# Patient Record
Sex: FEMALE | Race: WHITE | NOT HISPANIC OR LATINO | Employment: OTHER | URBAN - METROPOLITAN AREA
[De-identification: names, ages, dates, MRNs, and addresses within clinical notes are randomized per-mention and may not be internally consistent; named-entity substitution may affect disease eponyms.]

---

## 2023-01-20 ENCOUNTER — ANESTHESIA EVENT (OUTPATIENT)
Dept: SURGERY | Facility: HOSPITAL | Age: 88
DRG: 330 | End: 2023-01-20
Payer: MEDICARE

## 2023-01-20 ENCOUNTER — HOSPITAL ENCOUNTER (INPATIENT)
Facility: HOSPITAL | Age: 88
LOS: 10 days | Discharge: HOME HEALTH | DRG: 330 | End: 2023-01-30
Attending: EMERGENCY MEDICINE | Admitting: EMERGENCY MEDICINE
Payer: MEDICARE

## 2023-01-20 ENCOUNTER — ANESTHESIA (OUTPATIENT)
Dept: SURGERY | Facility: HOSPITAL | Age: 88
DRG: 330 | End: 2023-01-20
Payer: MEDICARE

## 2023-01-20 DIAGNOSIS — K56.609 COLON OBSTRUCTION: Primary | ICD-10-CM

## 2023-01-20 DIAGNOSIS — K56.609 SMALL BOWEL OBSTRUCTION: ICD-10-CM

## 2023-01-20 DIAGNOSIS — R11.2 NAUSEA AND VOMITING, UNSPECIFIED VOMITING TYPE: ICD-10-CM

## 2023-01-20 DIAGNOSIS — E86.0 DEHYDRATION: ICD-10-CM

## 2023-01-20 DIAGNOSIS — K56.609 LARGE BOWEL OBSTRUCTION: ICD-10-CM

## 2023-01-20 DIAGNOSIS — I25.10 CAD (CORONARY ARTERY DISEASE): ICD-10-CM

## 2023-01-20 LAB
ABORH RETYPE: NORMAL
ALBUMIN SERPL-MCNC: 2.9 G/DL (ref 3.4–4.8)
ALBUMIN/GLOB SERPL: 0.7 RATIO (ref 1.1–2)
ALP SERPL-CCNC: 98 UNIT/L (ref 40–150)
ALT SERPL-CCNC: 16 UNIT/L (ref 0–55)
AST SERPL-CCNC: 25 UNIT/L (ref 5–34)
BASOPHILS # BLD AUTO: 0.03 X10(3)/MCL (ref 0–0.2)
BASOPHILS NFR BLD AUTO: 0.4 %
BILIRUBIN DIRECT+TOT PNL SERPL-MCNC: 0.6 MG/DL
BUN SERPL-MCNC: 39 MG/DL (ref 9.8–20.1)
CALCIUM SERPL-MCNC: 8.6 MG/DL (ref 8.4–10.2)
CHLORIDE SERPL-SCNC: 93 MMOL/L (ref 98–111)
CO2 SERPL-SCNC: 25 MMOL/L (ref 23–31)
CREAT SERPL-MCNC: 1.53 MG/DL (ref 0.55–1.02)
EOSINOPHIL # BLD AUTO: 0 X10(3)/MCL (ref 0–0.9)
EOSINOPHIL NFR BLD AUTO: 0 %
ERYTHROCYTE [DISTWIDTH] IN BLOOD BY AUTOMATED COUNT: 12.6 % (ref 11.5–17)
GFR SERPLBLD CREATININE-BSD FMLA CKD-EPI: 32 MLS/MIN/1.73/M2
GLOBULIN SER-MCNC: 4.1 GM/DL (ref 2.4–3.5)
GLUCOSE SERPL-MCNC: 119 MG/DL (ref 75–121)
GROUP & RH: NORMAL
HCT VFR BLD AUTO: 33.8 % (ref 37–47)
HGB BLD-MCNC: 11 GM/DL (ref 12–16)
IMM GRANULOCYTES # BLD AUTO: 0.02 X10(3)/MCL (ref 0–0.04)
IMM GRANULOCYTES NFR BLD AUTO: 0.2 %
INDIRECT COOMBS GEL: NORMAL
LACTATE SERPL-SCNC: 1.3 MMOL/L (ref 0.5–2.2)
LIPASE SERPL-CCNC: 7 U/L
LYMPHOCYTES # BLD AUTO: 0.56 X10(3)/MCL (ref 0.6–4.6)
LYMPHOCYTES NFR BLD AUTO: 6.8 %
MCH RBC QN AUTO: 28.6 PG
MCHC RBC AUTO-ENTMCNC: 32.5 MG/DL (ref 33–36)
MCV RBC AUTO: 88 FL (ref 80–94)
MONOCYTES # BLD AUTO: 0.47 X10(3)/MCL (ref 0.1–1.3)
MONOCYTES NFR BLD AUTO: 5.7 %
NEUTROPHILS # BLD AUTO: 7.14 X10(3)/MCL (ref 2.1–9.2)
NEUTROPHILS NFR BLD AUTO: 86.9 %
PLATELET # BLD AUTO: 230 X10(3)/MCL (ref 130–400)
PMV BLD AUTO: 10.1 FL (ref 7.4–10.4)
POTASSIUM SERPL-SCNC: 4.1 MMOL/L (ref 3.5–5.1)
PROT SERPL-MCNC: 7 GM/DL (ref 5.8–7.6)
RBC # BLD AUTO: 3.84 X10(6)/MCL (ref 4.2–5.4)
SODIUM SERPL-SCNC: 130 MMOL/L (ref 132–146)
WBC # SPEC AUTO: 8.2 X10(3)/MCL (ref 4.5–11.5)

## 2023-01-20 PROCEDURE — 87075 CULTR BACTERIA EXCEPT BLOOD: CPT | Performed by: SURGERY

## 2023-01-20 PROCEDURE — 36415 COLL VENOUS BLD VENIPUNCTURE: CPT | Performed by: ANESTHESIOLOGY

## 2023-01-20 PROCEDURE — 88307 TISSUE EXAM BY PATHOLOGIST: CPT

## 2023-01-20 PROCEDURE — 87186 SC STD MICRODIL/AGAR DIL: CPT | Performed by: INTERNAL MEDICINE

## 2023-01-20 PROCEDURE — 93005 ELECTROCARDIOGRAM TRACING: CPT

## 2023-01-20 PROCEDURE — 63600175 PHARM REV CODE 636 W HCPCS: Performed by: NURSE ANESTHETIST, CERTIFIED REGISTERED

## 2023-01-20 PROCEDURE — 63600175 PHARM REV CODE 636 W HCPCS: Performed by: EMERGENCY MEDICINE

## 2023-01-20 PROCEDURE — 93010 ELECTROCARDIOGRAM REPORT: CPT | Mod: ,,, | Performed by: INTERNAL MEDICINE

## 2023-01-20 PROCEDURE — 27201423 OPTIME MED/SURG SUP & DEVICES STERILE SUPPLY: Performed by: SURGERY

## 2023-01-20 PROCEDURE — 36000709 HC OR TIME LEV III EA ADD 15 MIN: Performed by: SURGERY

## 2023-01-20 PROCEDURE — 30000890 MAYO GENERIC ORDERABLE: Performed by: INTERNAL MEDICINE

## 2023-01-20 PROCEDURE — 99285 EMERGENCY DEPT VISIT HI MDM: CPT | Mod: 25

## 2023-01-20 PROCEDURE — 86900 BLOOD TYPING SEROLOGIC ABO: CPT | Performed by: NURSE ANESTHETIST, CERTIFIED REGISTERED

## 2023-01-20 PROCEDURE — 87070 CULTURE OTHR SPECIMN AEROBIC: CPT | Performed by: SURGERY

## 2023-01-20 PROCEDURE — 93010 EKG 12-LEAD: ICD-10-PCS | Mod: ,,, | Performed by: INTERNAL MEDICINE

## 2023-01-20 PROCEDURE — 25000003 PHARM REV CODE 250: Performed by: INTERNAL MEDICINE

## 2023-01-20 PROCEDURE — 71000039 HC RECOVERY, EACH ADD'L HOUR: Performed by: SURGERY

## 2023-01-20 PROCEDURE — 86920 COMPATIBILITY TEST SPIN: CPT | Performed by: INTERNAL MEDICINE

## 2023-01-20 PROCEDURE — 37000009 HC ANESTHESIA EA ADD 15 MINS: Performed by: SURGERY

## 2023-01-20 PROCEDURE — 87102 FUNGUS ISOLATION CULTURE: CPT | Performed by: SURGERY

## 2023-01-20 PROCEDURE — 87077 CULTURE AEROBIC IDENTIFY: CPT | Performed by: SURGERY

## 2023-01-20 PROCEDURE — 37000008 HC ANESTHESIA 1ST 15 MINUTES: Performed by: SURGERY

## 2023-01-20 PROCEDURE — 87076 CULTURE ANAEROBE IDENT EACH: CPT | Performed by: INTERNAL MEDICINE

## 2023-01-20 PROCEDURE — 63600175 PHARM REV CODE 636 W HCPCS: Performed by: INTERNAL MEDICINE

## 2023-01-20 PROCEDURE — 25000003 PHARM REV CODE 250: Performed by: EMERGENCY MEDICINE

## 2023-01-20 PROCEDURE — 36000708 HC OR TIME LEV III 1ST 15 MIN: Performed by: SURGERY

## 2023-01-20 PROCEDURE — 96375 TX/PRO/DX INJ NEW DRUG ADDON: CPT

## 2023-01-20 PROCEDURE — 80053 COMPREHEN METABOLIC PANEL: CPT | Performed by: EMERGENCY MEDICINE

## 2023-01-20 PROCEDURE — 63600175 PHARM REV CODE 636 W HCPCS: Performed by: ANESTHESIOLOGY

## 2023-01-20 PROCEDURE — 88305 TISSUE EXAM BY PATHOLOGIST: CPT

## 2023-01-20 PROCEDURE — 83690 ASSAY OF LIPASE: CPT | Performed by: EMERGENCY MEDICINE

## 2023-01-20 PROCEDURE — 87077 CULTURE AEROBIC IDENTIFY: CPT | Performed by: EMERGENCY MEDICINE

## 2023-01-20 PROCEDURE — 25000003 PHARM REV CODE 250

## 2023-01-20 PROCEDURE — 87205 SMEAR GRAM STAIN: CPT | Performed by: SURGERY

## 2023-01-20 PROCEDURE — 96365 THER/PROPH/DIAG IV INF INIT: CPT

## 2023-01-20 PROCEDURE — C1729 CATH, DRAINAGE: HCPCS | Performed by: SURGERY

## 2023-01-20 PROCEDURE — 83605 ASSAY OF LACTIC ACID: CPT | Performed by: EMERGENCY MEDICINE

## 2023-01-20 PROCEDURE — 85025 COMPLETE CBC W/AUTO DIFF WBC: CPT | Performed by: EMERGENCY MEDICINE

## 2023-01-20 PROCEDURE — 11000001 HC ACUTE MED/SURG PRIVATE ROOM

## 2023-01-20 PROCEDURE — 25000003 PHARM REV CODE 250: Performed by: NURSE ANESTHETIST, CERTIFIED REGISTERED

## 2023-01-20 PROCEDURE — 71000033 HC RECOVERY, INTIAL HOUR: Performed by: SURGERY

## 2023-01-20 RX ORDER — LIDOCAINE HYDROCHLORIDE 20 MG/ML
INJECTION, SOLUTION EPIDURAL; INFILTRATION; INTRACAUDAL; PERINEURAL
Status: DISCONTINUED | OUTPATIENT
Start: 2023-01-20 | End: 2023-01-20

## 2023-01-20 RX ORDER — ONDANSETRON 2 MG/ML
4 INJECTION INTRAMUSCULAR; INTRAVENOUS EVERY 6 HOURS PRN
Status: DISCONTINUED | OUTPATIENT
Start: 2023-01-21 | End: 2023-01-25

## 2023-01-20 RX ORDER — DIPHENHYDRAMINE HYDROCHLORIDE 50 MG/ML
25 INJECTION INTRAMUSCULAR; INTRAVENOUS EVERY 6 HOURS PRN
Status: DISCONTINUED | OUTPATIENT
Start: 2023-01-20 | End: 2023-01-30 | Stop reason: HOSPADM

## 2023-01-20 RX ORDER — ONDANSETRON 2 MG/ML
4 INJECTION INTRAMUSCULAR; INTRAVENOUS EVERY 8 HOURS PRN
Status: DISCONTINUED | OUTPATIENT
Start: 2023-01-20 | End: 2023-01-20

## 2023-01-20 RX ORDER — LIDOCAINE HYDROCHLORIDE 20 MG/ML
10 JELLY TOPICAL
Status: ACTIVE | OUTPATIENT
Start: 2023-01-20 | End: 2023-01-20

## 2023-01-20 RX ORDER — DROPERIDOL 2.5 MG/ML
0.62 INJECTION, SOLUTION INTRAMUSCULAR; INTRAVENOUS ONCE
Status: COMPLETED | OUTPATIENT
Start: 2023-01-20 | End: 2023-01-20

## 2023-01-20 RX ORDER — DEXAMETHASONE SODIUM PHOSPHATE 4 MG/ML
INJECTION, SOLUTION INTRA-ARTICULAR; INTRALESIONAL; INTRAMUSCULAR; INTRAVENOUS; SOFT TISSUE
Status: DISCONTINUED | OUTPATIENT
Start: 2023-01-20 | End: 2023-01-20

## 2023-01-20 RX ORDER — FENTANYL CITRATE 50 UG/ML
INJECTION, SOLUTION INTRAMUSCULAR; INTRAVENOUS
Status: DISCONTINUED | OUTPATIENT
Start: 2023-01-20 | End: 2023-01-20

## 2023-01-20 RX ORDER — ROCURONIUM BROMIDE 10 MG/ML
INJECTION, SOLUTION INTRAVENOUS
Status: DISCONTINUED | OUTPATIENT
Start: 2023-01-20 | End: 2023-01-20

## 2023-01-20 RX ORDER — METOLAZONE 5 MG/1
5 TABLET ORAL DAILY
Status: ON HOLD | COMMUNITY
End: 2023-05-05 | Stop reason: HOSPADM

## 2023-01-20 RX ORDER — HYDROMORPHONE HYDROCHLORIDE 2 MG/ML
0.5 INJECTION, SOLUTION INTRAMUSCULAR; INTRAVENOUS; SUBCUTANEOUS EVERY 6 HOURS PRN
Status: DISCONTINUED | OUTPATIENT
Start: 2023-01-21 | End: 2023-01-30 | Stop reason: HOSPADM

## 2023-01-20 RX ORDER — FUROSEMIDE 40 MG/1
40 TABLET ORAL EVERY MORNING
COMMUNITY
Start: 2023-01-09

## 2023-01-20 RX ORDER — OXYCODONE HYDROCHLORIDE 5 MG/1
5 TABLET ORAL EVERY 4 HOURS PRN
Status: DISCONTINUED | OUTPATIENT
Start: 2023-01-21 | End: 2023-01-30 | Stop reason: HOSPADM

## 2023-01-20 RX ORDER — METOPROLOL TARTRATE 1 MG/ML
2.5 INJECTION, SOLUTION INTRAVENOUS EVERY 6 HOURS
Status: DISCONTINUED | OUTPATIENT
Start: 2023-01-21 | End: 2023-01-21

## 2023-01-20 RX ORDER — MUPIROCIN 20 MG/G
OINTMENT TOPICAL 2 TIMES DAILY
Status: CANCELLED | OUTPATIENT
Start: 2023-01-20 | End: 2023-01-25

## 2023-01-20 RX ORDER — DROPERIDOL 2.5 MG/ML
0.62 INJECTION, SOLUTION INTRAMUSCULAR; INTRAVENOUS ONCE
Status: DISCONTINUED | OUTPATIENT
Start: 2023-01-21 | End: 2023-01-20

## 2023-01-20 RX ORDER — DEXTROSE, SODIUM CHLORIDE, SODIUM LACTATE, POTASSIUM CHLORIDE, AND CALCIUM CHLORIDE 5; .6; .31; .03; .02 G/100ML; G/100ML; G/100ML; G/100ML; G/100ML
INJECTION, SOLUTION INTRAVENOUS CONTINUOUS
Status: DISCONTINUED | OUTPATIENT
Start: 2023-01-20 | End: 2023-01-26

## 2023-01-20 RX ORDER — FAMOTIDINE 10 MG/ML
20 INJECTION INTRAVENOUS DAILY
Status: DISCONTINUED | OUTPATIENT
Start: 2023-01-21 | End: 2023-01-29

## 2023-01-20 RX ORDER — KETOROLAC TROMETHAMINE 10 MG/1
10 TABLET, FILM COATED ORAL ONCE
Status: ON HOLD | COMMUNITY
End: 2023-05-05 | Stop reason: HOSPADM

## 2023-01-20 RX ORDER — DIPHENHYDRAMINE HYDROCHLORIDE 50 MG/ML
25 INJECTION INTRAMUSCULAR; INTRAVENOUS
Status: COMPLETED | OUTPATIENT
Start: 2023-01-20 | End: 2023-01-20

## 2023-01-20 RX ORDER — CEFAZOLIN SODIUM 2 G/50ML
2 SOLUTION INTRAVENOUS ONCE
Status: COMPLETED | OUTPATIENT
Start: 2023-01-20 | End: 2023-01-20

## 2023-01-20 RX ORDER — HYDROMORPHONE HYDROCHLORIDE 2 MG/ML
0.5 INJECTION, SOLUTION INTRAMUSCULAR; INTRAVENOUS; SUBCUTANEOUS EVERY 5 MIN PRN
Status: DISCONTINUED | OUTPATIENT
Start: 2023-01-20 | End: 2023-01-30 | Stop reason: HOSPADM

## 2023-01-20 RX ORDER — NALOXONE HCL 0.4 MG/ML
0.02 VIAL (ML) INJECTION
Status: DISCONTINUED | OUTPATIENT
Start: 2023-01-21 | End: 2023-01-30 | Stop reason: HOSPADM

## 2023-01-20 RX ORDER — TALC
6 POWDER (GRAM) TOPICAL NIGHTLY PRN
Status: DISCONTINUED | OUTPATIENT
Start: 2023-01-20 | End: 2023-01-30 | Stop reason: HOSPADM

## 2023-01-20 RX ORDER — DEXTROSE, SODIUM CHLORIDE, SODIUM LACTATE, POTASSIUM CHLORIDE, AND CALCIUM CHLORIDE 5; .6; .31; .03; .02 G/100ML; G/100ML; G/100ML; G/100ML; G/100ML
INJECTION, SOLUTION INTRAVENOUS CONTINUOUS
Status: DISCONTINUED | OUTPATIENT
Start: 2023-01-20 | End: 2023-01-20

## 2023-01-20 RX ORDER — PROPOFOL 10 MG/ML
VIAL (ML) INTRAVENOUS
Status: DISCONTINUED | OUTPATIENT
Start: 2023-01-20 | End: 2023-01-20

## 2023-01-20 RX ORDER — ENOXAPARIN SODIUM 100 MG/ML
30 INJECTION SUBCUTANEOUS EVERY 24 HOURS
Status: DISCONTINUED | OUTPATIENT
Start: 2023-01-21 | End: 2023-01-30 | Stop reason: HOSPADM

## 2023-01-20 RX ORDER — LACTULOSE 10 G/15ML
SOLUTION ORAL; RECTAL
COMMUNITY

## 2023-01-20 RX ORDER — ONDANSETRON 2 MG/ML
INJECTION INTRAMUSCULAR; INTRAVENOUS
Status: DISCONTINUED | OUTPATIENT
Start: 2023-01-20 | End: 2023-01-20

## 2023-01-20 RX ORDER — ONDANSETRON 2 MG/ML
4 INJECTION INTRAMUSCULAR; INTRAVENOUS DAILY PRN
Status: COMPLETED | OUTPATIENT
Start: 2023-01-20 | End: 2023-01-24

## 2023-01-20 RX ORDER — PROCHLORPERAZINE EDISYLATE 5 MG/ML
5 INJECTION INTRAMUSCULAR; INTRAVENOUS
Status: COMPLETED | OUTPATIENT
Start: 2023-01-20 | End: 2023-01-20

## 2023-01-20 RX ORDER — ASPIRIN 81 MG/1
81 TABLET ORAL
COMMUNITY

## 2023-01-20 RX ORDER — VALSARTAN AND HYDROCHLOROTHIAZIDE 160; 12.5 MG/1; MG/1
1 TABLET, FILM COATED ORAL
Status: ON HOLD | COMMUNITY
Start: 2023-01-09 | End: 2023-05-05 | Stop reason: HOSPADM

## 2023-01-20 RX ORDER — EPHEDRINE SULFATE 50 MG/ML
INJECTION, SOLUTION INTRAVENOUS
Status: DISCONTINUED | OUTPATIENT
Start: 2023-01-20 | End: 2023-01-20

## 2023-01-20 RX ORDER — SODIUM CHLORIDE 0.9 % (FLUSH) 0.9 %
10 SYRINGE (ML) INJECTION
Status: DISCONTINUED | OUTPATIENT
Start: 2023-01-20 | End: 2023-01-30 | Stop reason: HOSPADM

## 2023-01-20 RX ORDER — ACETAMINOPHEN 10 MG/ML
INJECTION, SOLUTION INTRAVENOUS
Status: DISCONTINUED | OUTPATIENT
Start: 2023-01-20 | End: 2023-01-20

## 2023-01-20 RX ORDER — ACETAMINOPHEN 10 MG/ML
1000 INJECTION, SOLUTION INTRAVENOUS EVERY 8 HOURS
Status: COMPLETED | OUTPATIENT
Start: 2023-01-21 | End: 2023-01-21

## 2023-01-20 RX ORDER — CARVEDILOL 6.25 MG/1
6.25 TABLET ORAL 2 TIMES DAILY
COMMUNITY
Start: 2023-01-09

## 2023-01-20 RX ORDER — ONDANSETRON 2 MG/ML
4 INJECTION INTRAMUSCULAR; INTRAVENOUS
Status: COMPLETED | OUTPATIENT
Start: 2023-01-20 | End: 2023-01-20

## 2023-01-20 RX ORDER — MUPIROCIN 20 MG/G
OINTMENT TOPICAL 2 TIMES DAILY
Status: DISPENSED | OUTPATIENT
Start: 2023-01-20 | End: 2023-01-25

## 2023-01-20 RX ORDER — TRAZODONE HYDROCHLORIDE 100 MG/1
100 TABLET ORAL NIGHTLY
Status: ON HOLD | COMMUNITY
Start: 2023-01-09 | End: 2023-05-05 | Stop reason: SDUPTHER

## 2023-01-20 RX ADMIN — ONDANSETRON 4 MG: 2 INJECTION INTRAMUSCULAR; INTRAVENOUS at 10:01

## 2023-01-20 RX ADMIN — PROPOFOL 80 MG: 10 INJECTION, EMULSION INTRAVENOUS at 07:01

## 2023-01-20 RX ADMIN — VASOPRESSIN 2 UNITS: 20 INJECTION, SOLUTION INTRAMUSCULAR; SUBCUTANEOUS at 08:01

## 2023-01-20 RX ADMIN — EPHEDRINE SULFATE 20 MG: 50 INJECTION INTRAVENOUS at 07:01

## 2023-01-20 RX ADMIN — PIPERACILLIN AND TAZOBACTAM 4.5 G: 4; .5 INJECTION, POWDER, LYOPHILIZED, FOR SOLUTION INTRAVENOUS; PARENTERAL at 05:01

## 2023-01-20 RX ADMIN — VASOPRESSIN 1 UNITS: 20 INJECTION, SOLUTION INTRAMUSCULAR; SUBCUTANEOUS at 08:01

## 2023-01-20 RX ADMIN — SODIUM CHLORIDE, POTASSIUM CHLORIDE, SODIUM LACTATE AND CALCIUM CHLORIDE: 600; 310; 30; 20 INJECTION, SOLUTION INTRAVENOUS at 08:01

## 2023-01-20 RX ADMIN — ROCURONIUM BROMIDE 50 MG: 10 INJECTION, SOLUTION INTRAVENOUS at 07:01

## 2023-01-20 RX ADMIN — ROCURONIUM BROMIDE 20 MG: 10 INJECTION, SOLUTION INTRAVENOUS at 09:01

## 2023-01-20 RX ADMIN — FENTANYL CITRATE 50 MCG: 50 INJECTION, SOLUTION INTRAMUSCULAR; INTRAVENOUS at 10:01

## 2023-01-20 RX ADMIN — SODIUM CHLORIDE, SODIUM LACTATE, POTASSIUM CHLORIDE, CALCIUM CHLORIDE AND DEXTROSE MONOHYDRATE: 5; 600; 310; 30; 20 INJECTION, SOLUTION INTRAVENOUS at 07:01

## 2023-01-20 RX ADMIN — SODIUM CHLORIDE, POTASSIUM CHLORIDE, SODIUM LACTATE AND CALCIUM CHLORIDE: 600; 310; 30; 20 INJECTION, SOLUTION INTRAVENOUS at 10:01

## 2023-01-20 RX ADMIN — VASOPRESSIN 2 UNITS: 20 INJECTION, SOLUTION INTRAMUSCULAR; SUBCUTANEOUS at 07:01

## 2023-01-20 RX ADMIN — HYDROMORPHONE HYDROCHLORIDE 0.5 MG: 2 INJECTION INTRAMUSCULAR; INTRAVENOUS; SUBCUTANEOUS at 11:01

## 2023-01-20 RX ADMIN — LIDOCAINE HYDROCHLORIDE 100 MG: 20 INJECTION, SOLUTION EPIDURAL; INFILTRATION; INTRACAUDAL; PERINEURAL at 07:01

## 2023-01-20 RX ADMIN — CEFAZOLIN SODIUM 2 G: 2 SOLUTION INTRAVENOUS at 07:01

## 2023-01-20 RX ADMIN — DROPERIDOL 0.62 MG: 2.5 INJECTION, SOLUTION INTRAMUSCULAR; INTRAVENOUS at 11:01

## 2023-01-20 RX ADMIN — FENTANYL CITRATE 50 MCG: 50 INJECTION, SOLUTION INTRAMUSCULAR; INTRAVENOUS at 07:01

## 2023-01-20 RX ADMIN — SODIUM CHLORIDE, POTASSIUM CHLORIDE, SODIUM LACTATE AND CALCIUM CHLORIDE: 600; 310; 30; 20 INJECTION, SOLUTION INTRAVENOUS at 07:01

## 2023-01-20 RX ADMIN — DEXAMETHASONE SODIUM PHOSPHATE 4 MG: 4 INJECTION, SOLUTION INTRA-ARTICULAR; INTRALESIONAL; INTRAMUSCULAR; INTRAVENOUS; SOFT TISSUE at 07:01

## 2023-01-20 RX ADMIN — SUGAMMADEX 150 MG: 100 INJECTION, SOLUTION INTRAVENOUS at 10:01

## 2023-01-20 RX ADMIN — ACETAMINOPHEN 1000 MG: 10 INJECTION, SOLUTION INTRAVENOUS at 09:01

## 2023-01-20 RX ADMIN — EPHEDRINE SULFATE 10 MG: 50 INJECTION INTRAVENOUS at 07:01

## 2023-01-20 RX ADMIN — SODIUM CHLORIDE, POTASSIUM CHLORIDE, SODIUM LACTATE AND CALCIUM CHLORIDE 1000 ML: 600; 310; 30; 20 INJECTION, SOLUTION INTRAVENOUS at 04:01

## 2023-01-20 RX ADMIN — VASOPRESSIN 1 UNITS: 20 INJECTION, SOLUTION INTRAMUSCULAR; SUBCUTANEOUS at 07:01

## 2023-01-20 RX ADMIN — BENZOCAINE: 200 SPRAY DENTAL; ORAL; PERIODONTAL at 05:01

## 2023-01-20 RX ADMIN — DIPHENHYDRAMINE HYDROCHLORIDE 25 MG: 50 INJECTION, SOLUTION INTRAMUSCULAR; INTRAVENOUS at 05:01

## 2023-01-20 RX ADMIN — PROCHLORPERAZINE EDISYLATE 5 MG: 5 INJECTION INTRAMUSCULAR; INTRAVENOUS at 05:01

## 2023-01-20 RX ADMIN — ONDANSETRON 4 MG: 2 INJECTION INTRAMUSCULAR; INTRAVENOUS at 04:01

## 2023-01-20 NOTE — PROGRESS NOTES
Inpatient Nutrition Assessment    Admit Date: 1/20/2023   Total duration of encounter: 1 day     Nutrition Recommendation/Prescription     When medically appropriate, advance diet as tolerated to GI soft  If unable to progress diet in 3-5 days, recommend start parenteral nutrition.  Consult RD for recs  Weekly wts  Monitor labs and replace as needed.    Communication of Recommendations: reviewed with patient/caregiver    Nutrition Assessment     Malnutrition Assessment/Nutrition-Focused Physical Exam    Malnutrition in the context of acute illness or injury  Degree of Malnutrition: does not meet criteria  Energy Intake: does not meet criteria  Interpretation of Weight Loss: does not meet criteria  Body Fat:does not meet criteria  Area of Body Fat Loss: does not meet criteria  Muscle Mass Loss: does not meet criteria  Area of Muscle Mass Loss: does not meet criteria  Fluid Accumulation: does not meet criteria  Edema: does not meet criteria   Reduced  Strength: unable to obtain  A minimum of two characteristics is recommended for diagnosis of either severe or non-severe malnutrition.    Chart Review    Reason Seen: continuous nutrition monitoring    Malnutrition Screening Tool Results   Have you recently lost weight without trying?: No  Have you been eating poorly because of a decreased appetite?: No   MST Score: 0     Diagnosis:  N/V, Large Bowel Obstructions, Small Bowel Obstruction, Dehydration    Relevant Medical History:   Past Medical History:   Diagnosis Date    Diverticulosis     Hypertension          Nutrition-Related Medications: IVF:  D5% at 75 ml/hr  Calorie Containing IV Medications: no significant kcals from medications at this time    Nutrition-Related Labs:  1/20:  H/H 11/33.8 L, Na 130 L, BUN 39 H, Creat 1.53 H, Alb 2.9 L,     Diet/PN Order: Diet NPO  Oral Supplement Order: none  Tube Feeding Order: none  Appetite/Oral Intake: NPO/NPO  Factors Affecting Nutritional Intake: nausea and  "vomiting  Food/Congregational/Cultural Preferences: none reported  Food Allergies: no known food allergies       Wound(s):   none noted    Comments    :  Pt asleep when visited. NGT to suction.  Spoke with patient's son at bedside.  Reports patient with usual good appetite / intake until N/V started ~ 2 days ago.  States family thought she was constipated and gave pt miralax and prune juice.  Denies wt loss.  Labs / meds reviewed.      Anthropometrics    Height: 5' 1" (154.9 cm)    Last Weight: 56.7 kg (125 lb) (23 0408)    BMI (Calculated): 23.6  BMI Classification: normal (BMI 18.5-24.9)     Ideal Body Weight (IBW), Female: 105 lb     % Ideal Body Weight, Female (lb): 119.05 %                    Usual Body Weight (UBW), k.7 kg  % Usual Body Weight: 100.21     Usual Weight Provided By: family/caregiver    Wt Readings from Last 5 Encounters:   23 56.7 kg (125 lb)     Weight Change(s) Since Admission:  Admit Weight: 56.7 kg (125 lb) (23 0408)  :  56.7 kg    Estimated Needs    Weight Used For Calorie Calculations: 56.7 kg (125 lb)  Energy Calorie Requirements (kcal): 0133-6774 kcal/d (25-30 kcal/kg)     Weight Used For Protein Calculations: 56.7 kg (125 lb)  Protein Requirements: 56 - 68 g Pro/d (1 - 1.2 g/kg)  Fluid Requirements (mL): 6531-1971 ml/d (25-30 ml/kg)  Temp: 97.3 °F (36.3 °C)       Enteral Nutrition    Patient not receiving enteral nutrition at this time.    Parenteral Nutrition    Patient not receiving parenteral nutrition support at this time.    Evaluation of Received Nutrient Intake    Calories: not meeting estimated needs  Protein: not meeting estimated needs    Patient Education    Not applicable.    Nutrition Diagnosis     PES: Inadequate oral intake related to bowel obstruction as evidenced by nausea / vomiting, NPO status x 1 day. (new)    Interventions/Goals     Intervention(s): general/healthful diet and collaboration with other providers  Goal: Meet greater than 75% " of nutritional needs by follow-up. (new)    Monitoring & Evaluation     Dietitian will monitor energy intake and weight.  Nutrition Risk/Follow-Up: moderate (follow-up in 3-5 days)   Please consult if re-assessment needed sooner.

## 2023-01-20 NOTE — ED PROVIDER NOTES
Encounter Date: 1/20/2023       History     Chief Complaint   Patient presents with    Abdominal Pain     Pt transferred from [facility], dx with SBO     The history is provided by the patient and the EMS personnel. No  was used.   Abdominal Pain  The current episode started yesterday. The onset of the illness was gradual. The abdominal pain is generalized. The abdominal pain does not radiate. The other symptoms of the illness include nausea, vomiting and diarrhea. The other symptoms of the illness do not include fever, shortness of breath or dysuria.   The diarrhea began 2 days ago. The diarrhea is watery.   Nausea began yesterday.   The vomiting began yesterday. Vomiting occurs 6 to 10 times per day.   The illness is associated with laxative use. The patient states that she believes she is currently not pregnant. The patient has had a change in bowel habit. Risk factors for an acute abdominal problem include being elderly. Symptoms associated with the illness do not include back pain.   Pt transferred from [place] for surgical evaluation.    Review of patient's allergies indicates:  No Known Allergies  Past Medical History:   Diagnosis Date    Diverticulosis     Hypertension      History reviewed. No pertinent surgical history.  History reviewed. No pertinent family history.  Social History     Tobacco Use    Smoking status: Never    Smokeless tobacco: Never   Substance Use Topics    Alcohol use: Never    Drug use: Never     Review of Systems   Constitutional:  Negative for fever.   HENT:  Negative for sore throat.    Respiratory:  Negative for shortness of breath.    Cardiovascular:  Negative for chest pain.   Gastrointestinal:  Positive for abdominal pain, diarrhea, nausea and vomiting.   Genitourinary:  Negative for dysuria.   Musculoskeletal:  Negative for back pain.   Skin:  Negative for rash.   Neurological:  Negative for weakness.   Hematological:  Does not  bruise/bleed easily.     Physical Exam     Initial Vitals [01/20/23 0408]   BP Pulse Resp Temp SpO2   99/65 71 18 98.1 °F (36.7 °C) 96 %      MAP       --         Physical Exam    Nursing note and vitals reviewed.  Constitutional: She appears well-developed and well-nourished.   HENT:   Head: Normocephalic and atraumatic.   Right Ear: External ear normal.   Left Ear: External ear normal.   Eyes: Conjunctivae and EOM are normal. Pupils are equal, round, and reactive to light.   Neck: Neck supple.   Normal range of motion.  Cardiovascular:  Normal rate, regular rhythm, normal heart sounds and intact distal pulses.           Pulmonary/Chest: Breath sounds normal.   Abdominal: Abdomen is soft. Bowel sounds are normal. There is abdominal tenderness in the left lower quadrant.     There is guarding.   Musculoskeletal:         General: Normal range of motion.      Cervical back: Normal range of motion and neck supple.     Neurological: She is alert and oriented to person, place, and time. GCS score is 15. GCS eye subscore is 4. GCS verbal subscore is 5. GCS motor subscore is 6.   Skin: Skin is warm and dry. Capillary refill takes less than 2 seconds.   Psychiatric: She has a normal mood and affect. Her behavior is normal. Judgment and thought content normal.       ED Course   Procedures  Labs Reviewed   COMPREHENSIVE METABOLIC PANEL - Abnormal; Notable for the following components:       Result Value    Sodium Level 130 (*)     Chloride 93 (*)     Blood Urea Nitrogen 39.0 (*)     Creatinine 1.53 (*)     Albumin Level 2.9 (*)     Globulin 4.1 (*)     Albumin/Globulin Ratio 0.7 (*)     All other components within normal limits   CBC WITH DIFFERENTIAL - Abnormal; Notable for the following components:    RBC 3.84 (*)     Hgb 11.0 (*)     Hct 33.8 (*)     MCHC 32.5 (*)     Lymph # 0.56 (*)     All other components within normal limits   LACTIC ACID, PLASMA - Normal   LIPASE - Normal   BLOOD CULTURE OLG   BLOOD CULTURE OLG   CBC  "W/ AUTO DIFFERENTIAL    Narrative:     The following orders were created for panel order CBC auto differential.  Procedure                               Abnormality         Status                     ---------                               -----------         ------                     CBC with Differential[[id number]]        Abnormal            Final result                 Please view results for these tests on the individual orders.          Imaging Results    None          Medications   piperacillin-tazobactam (ZOSYN) 4.5 g in dextrose 5 % in water (D5W) 5 % 100 mL IVPB (MB+) (4.5 g Intravenous New Bag 1/20/23 0538)   LIDOcaine HCL 2% jelly 10 mL (has no administration in time range)   lactated ringers bolus 1,000 mL (1,000 mLs Intravenous New Bag 1/20/23 0418)   ondansetron injection 4 mg (4 mg Intravenous Given 1/20/23 0418)   diphenhydrAMINE injection 25 mg (25 mg Intravenous Given 1/20/23 0519)   prochlorperazine injection Soln 5 mg (5 mg Intravenous Given 1/20/23 0519)   benzocaine 20 % mouth spray ( Mouth/Throat Given 1/20/23 0585)      Pt transferred for surgical evaluation after CT at transferring facility was interpreted as: "obstructing lesion in the LLQ at the proximal aspect of the sigmoid colon, rule out adenocarcinoma and local metastatic disease.  4 cm air-containing fluid collection to the left of this lesion, suspicious for pericolonic abscess/contained microperforation.  This is causing large bowel obstruction and secondary small bowel obstruction.  There is pneumatosis in the cecum and ascending colon which suggests ischemia..."    Pt currently complaining of mild pain and nausea.  She does grimace with deep palpation of the LLQ.  Of note, she was admitted to [facility] 10 months ago and had what sounds like similar CT findings then.  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  No surgery or percutaneous drainage " procedure was required.    It appears that antibiotics were not given prior to transfer.  Zosyn ordered.           ED Course as of 01/20/23 0552   Fri Jan 20, 2023   0519 WBC still WNL, lactate normal; vitals remain stable.  Pt continues to c/o nausea, despite multiple doses of Zofran.  Will try low dose Compazine with Benadryl to reduce risk of EPS. [CL]      ED Course User Index  [CL] Kip Lock MD          I spoke with Dr. Marika Acevedo (general surgery) - recommends decompression with NGT and admit to medicine and she will see patient on the floor.  Dr. Anne paged for admission.       Clinical Impression:   Final diagnoses:  [R11.2] Nausea and vomiting, unspecified vomiting type (Primary)  [K56.609] Large bowel obstruction  [K56.609] Small bowel obstruction  [E86.0] Dehydration        ED Disposition Condition    Admit Fair                Kip Lock MD  01/20/23 0552

## 2023-01-20 NOTE — ANESTHESIA PREPROCEDURE EVALUATION
01/20/2023  Bailey Medina is a 89 y.o., female.      Pre-op Assessment    I have reviewed the Patient Summary Reports.     I have reviewed the Nursing Notes. I have reviewed the NPO Status.   I have reviewed the Medications.     Review of Systems  Anesthesia Hx:  No problems with previous Anesthesia  History of prior surgery of interest to airway management or planning: Denies Family Hx of Anesthesia complications.   Denies Personal Hx of Anesthesia complications.   Hematology/Oncology:  Hematology Normal   Oncology Normal     EENT/Dental:EENT/Dental Normal   Cardiovascular:   Hypertension ECG has been reviewed.    Pulmonary:  Pulmonary Normal    Renal/:  Renal/ Normal     Hepatic/GI:   Likely bowel perforation in sigmoid colon, hx of diverticulosis   Musculoskeletal:   Arthritis     Neurological:  Neurology Normal    Endocrine:  Endocrine Normal    Dermatological:  Skin Normal    Psych:  Psychiatric Normal           Physical Exam  General: Well nourished, Cooperative, Alert and Oriented    Airway:  Mallampati: I   Mouth Opening: Normal  TM Distance: Normal  Tongue: Normal  Neck ROM: Normal ROM    Dental:  Intact    Chest/Lungs:  Clear to auscultation, Normal Respiratory Rate    Heart:  Rate: Normal  Rhythm: Regular Rhythm  Left bundle branch block with pac's  Abdomen:  Tenderness        Anesthesia Plan  Type of Anesthesia, risks & benefits discussed:    Anesthesia Type: Gen ETT  Intra-op Monitoring Plan: Standard ASA Monitors  Post Op Pain Control Plan: multimodal analgesia and peripheral nerve block  Induction:  IV  Airway Plan: Direct  Informed Consent: Informed consent signed with the Patient and all parties understand the risks and agree with anesthesia plan.  All questions answered. Patient consented to blood products? Yes  ASA Score: 2 Emergent  Day of Surgery Review of History & Physical: H&P  Update referred to the surgeon/provider.I have interviewed and examined the patient. I have reviewed the patient's H&P dated: There are no significant changes.     Ready For Surgery From Anesthesia Perspective.     .

## 2023-01-21 PROBLEM — I73.9 PVD (PERIPHERAL VASCULAR DISEASE): Chronic | Status: ACTIVE | Noted: 2023-01-21

## 2023-01-21 PROBLEM — I25.10 CAD (CORONARY ARTERY DISEASE): Chronic | Status: ACTIVE | Noted: 2023-01-21

## 2023-01-21 LAB
ALBUMIN SERPL-MCNC: 2.4 G/DL (ref 3.4–4.8)
ALBUMIN/GLOB SERPL: 0.8 RATIO (ref 1.1–2)
ALP SERPL-CCNC: 85 UNIT/L (ref 40–150)
ALT SERPL-CCNC: 16 UNIT/L (ref 0–55)
AST SERPL-CCNC: 26 UNIT/L (ref 5–34)
BASOPHILS # BLD AUTO: 0.01 X10(3)/MCL (ref 0–0.2)
BASOPHILS NFR BLD AUTO: 0.1 %
BILIRUBIN DIRECT+TOT PNL SERPL-MCNC: 0.8 MG/DL
BUN SERPL-MCNC: 36 MG/DL (ref 9.8–20.1)
CALCIUM SERPL-MCNC: 7.9 MG/DL (ref 8.4–10.2)
CHLORIDE SERPL-SCNC: 97 MMOL/L (ref 98–111)
CO2 SERPL-SCNC: 22 MMOL/L (ref 23–31)
CREAT SERPL-MCNC: 1.45 MG/DL (ref 0.55–1.02)
EOSINOPHIL # BLD AUTO: 0.02 X10(3)/MCL (ref 0–0.9)
EOSINOPHIL NFR BLD AUTO: 0.2 %
ERYTHROCYTE [DISTWIDTH] IN BLOOD BY AUTOMATED COUNT: 12.9 % (ref 11.5–17)
GFR SERPLBLD CREATININE-BSD FMLA CKD-EPI: 34 MLS/MIN/1.73/M2
GLOBULIN SER-MCNC: 3.2 GM/DL (ref 2.4–3.5)
GLUCOSE SERPL-MCNC: 169 MG/DL (ref 75–121)
HCT VFR BLD AUTO: 33.6 % (ref 37–47)
HGB BLD-MCNC: 10.6 GM/DL (ref 12–16)
IMM GRANULOCYTES # BLD AUTO: 0.02 X10(3)/MCL (ref 0–0.04)
IMM GRANULOCYTES NFR BLD AUTO: 0.2 %
LYMPHOCYTES # BLD AUTO: 0.35 X10(3)/MCL (ref 0.6–4.6)
LYMPHOCYTES NFR BLD AUTO: 3.4 %
MAGNESIUM SERPL-MCNC: 1.6 MG/DL (ref 1.6–2.6)
MCH RBC QN AUTO: 28.3 PG
MCHC RBC AUTO-ENTMCNC: 31.5 MG/DL (ref 33–36)
MCV RBC AUTO: 89.8 FL (ref 80–94)
MONOCYTES # BLD AUTO: 0.61 X10(3)/MCL (ref 0.1–1.3)
MONOCYTES NFR BLD AUTO: 6 %
NEUTROPHILS # BLD AUTO: 9.17 X10(3)/MCL (ref 2.1–9.2)
NEUTROPHILS NFR BLD AUTO: 90.1 %
PHOSPHATE SERPL-MCNC: 3.2 MG/DL (ref 2.3–4.7)
PLATELET # BLD AUTO: 188 X10(3)/MCL (ref 130–400)
PMV BLD AUTO: 10 FL (ref 7.4–10.4)
POTASSIUM SERPL-SCNC: 4.2 MMOL/L (ref 3.5–5.1)
PROT SERPL-MCNC: 5.6 GM/DL (ref 5.8–7.6)
RBC # BLD AUTO: 3.74 X10(6)/MCL (ref 4.2–5.4)
SODIUM SERPL-SCNC: 131 MMOL/L (ref 132–146)
WBC # SPEC AUTO: 10.2 X10(3)/MCL (ref 4.5–11.5)

## 2023-01-21 PROCEDURE — 11000001 HC ACUTE MED/SURG PRIVATE ROOM

## 2023-01-21 PROCEDURE — 25000003 PHARM REV CODE 250: Performed by: INTERNAL MEDICINE

## 2023-01-21 PROCEDURE — 63600175 PHARM REV CODE 636 W HCPCS: Mod: JG | Performed by: INTERNAL MEDICINE

## 2023-01-21 PROCEDURE — 36415 COLL VENOUS BLD VENIPUNCTURE: CPT | Performed by: INTERNAL MEDICINE

## 2023-01-21 PROCEDURE — 63600175 PHARM REV CODE 636 W HCPCS: Performed by: SURGERY

## 2023-01-21 PROCEDURE — 99900035 HC TECH TIME PER 15 MIN (STAT)

## 2023-01-21 PROCEDURE — 83735 ASSAY OF MAGNESIUM: CPT | Performed by: SURGERY

## 2023-01-21 PROCEDURE — 80053 COMPREHEN METABOLIC PANEL: CPT | Performed by: SURGERY

## 2023-01-21 PROCEDURE — P9045 ALBUMIN (HUMAN), 5%, 250 ML: HCPCS | Mod: JG | Performed by: SURGERY

## 2023-01-21 PROCEDURE — 87040 BLOOD CULTURE FOR BACTERIA: CPT | Performed by: INTERNAL MEDICINE

## 2023-01-21 PROCEDURE — P9047 ALBUMIN (HUMAN), 25%, 50ML: HCPCS | Mod: JG | Performed by: INTERNAL MEDICINE

## 2023-01-21 PROCEDURE — 36415 COLL VENOUS BLD VENIPUNCTURE: CPT | Performed by: SURGERY

## 2023-01-21 PROCEDURE — 25000003 PHARM REV CODE 250: Performed by: SURGERY

## 2023-01-21 PROCEDURE — 85025 COMPLETE CBC W/AUTO DIFF WBC: CPT | Performed by: SURGERY

## 2023-01-21 PROCEDURE — 27000221 HC OXYGEN, UP TO 24 HOURS

## 2023-01-21 PROCEDURE — 84100 ASSAY OF PHOSPHORUS: CPT | Performed by: SURGERY

## 2023-01-21 PROCEDURE — 94761 N-INVAS EAR/PLS OXIMETRY MLT: CPT

## 2023-01-21 PROCEDURE — 97162 PT EVAL MOD COMPLEX 30 MIN: CPT

## 2023-01-21 PROCEDURE — 94799 UNLISTED PULMONARY SVC/PX: CPT

## 2023-01-21 RX ORDER — ALBUMIN HUMAN 250 G/1000ML
25 SOLUTION INTRAVENOUS ONCE
Status: COMPLETED | OUTPATIENT
Start: 2023-01-21 | End: 2023-01-21

## 2023-01-21 RX ORDER — ALBUMIN HUMAN 50 G/1000ML
SOLUTION INTRAVENOUS
Status: DISPENSED
Start: 2023-01-21 | End: 2023-01-21

## 2023-01-21 RX ORDER — ALBUMIN HUMAN 250 G/1000ML
25 SOLUTION INTRAVENOUS ONCE
Status: DISCONTINUED | OUTPATIENT
Start: 2023-01-21 | End: 2023-01-21

## 2023-01-21 RX ORDER — ALBUMIN HUMAN 50 G/1000ML
25 SOLUTION INTRAVENOUS ONCE
Status: COMPLETED | OUTPATIENT
Start: 2023-01-21 | End: 2023-01-21

## 2023-01-21 RX ADMIN — ONDANSETRON 4 MG: 2 INJECTION INTRAMUSCULAR; INTRAVENOUS at 05:01

## 2023-01-21 RX ADMIN — VANCOMYCIN HYDROCHLORIDE 500 MG: 500 INJECTION, POWDER, LYOPHILIZED, FOR SOLUTION INTRAVENOUS at 11:01

## 2023-01-21 RX ADMIN — ACETAMINOPHEN 1000 MG: 10 INJECTION INTRAVENOUS at 03:01

## 2023-01-21 RX ADMIN — ACETAMINOPHEN 1000 MG: 10 INJECTION INTRAVENOUS at 10:01

## 2023-01-21 RX ADMIN — SODIUM CHLORIDE 1000 ML: 9 INJECTION, SOLUTION INTRAVENOUS at 01:01

## 2023-01-21 RX ADMIN — FAMOTIDINE 20 MG: 10 INJECTION INTRAVENOUS at 09:01

## 2023-01-21 RX ADMIN — PIPERACILLIN AND TAZOBACTAM 4.5 G: 4; .5 INJECTION, POWDER, LYOPHILIZED, FOR SOLUTION INTRAVENOUS; PARENTERAL at 06:01

## 2023-01-21 RX ADMIN — MUPIROCIN: 20 OINTMENT TOPICAL at 09:01

## 2023-01-21 RX ADMIN — ACETAMINOPHEN 1000 MG: 10 INJECTION INTRAVENOUS at 05:01

## 2023-01-21 RX ADMIN — SODIUM CHLORIDE, SODIUM LACTATE, POTASSIUM CHLORIDE, CALCIUM CHLORIDE AND DEXTROSE MONOHYDRATE: 5; 600; 310; 30; 20 INJECTION, SOLUTION INTRAVENOUS at 12:01

## 2023-01-21 RX ADMIN — ALBUMIN (HUMAN) 25 G: 12.5 SOLUTION INTRAVENOUS at 10:01

## 2023-01-21 RX ADMIN — ALBUMIN (HUMAN) 25 G: 12.5 SOLUTION INTRAVENOUS at 02:01

## 2023-01-21 RX ADMIN — ENOXAPARIN SODIUM 30 MG: 30 INJECTION SUBCUTANEOUS at 06:01

## 2023-01-21 NOTE — ADDENDUM NOTE
Addendum  created 01/20/23 2313 by Perry Bey CRNA    Order list changed, Pharmacy for encounter modified

## 2023-01-21 NOTE — ANESTHESIA PROCEDURE NOTES
Intubation    Date/Time: 1/20/2023 7:08 PM  Performed by: Perry Bey CRNA  Authorized by: Susan Ramirez MD     Intubation:     Induction:  Rapid sequence induction    Intubated:  Postinduction    Mask Ventilation:  Not attempted    Attempts:  1    Attempted By:  CRNA    Method of Intubation:  Direct    Blade:  Cline 2    Laryngeal View Grade: Grade I - full view of cords      Difficult Airway Encountered?: No      Complications:  None    Airway Device:  Oral endotracheal tube    Airway Device Size:  7.5    Style/Cuff Inflation:  Cuffed (inflated to minimal occlusive pressure)    Inflation Amount (mL):  5    Tube secured:  21    Secured at:  The lips    Placement Verified By:  Capnometry and Colorimetric ETCO2 device    Complicating Factors:  None    Findings Post-Intubation:  BS equal bilateral and atraumatic/condition of teeth unchanged

## 2023-01-21 NOTE — ANESTHESIA POSTPROCEDURE EVALUATION
Anesthesia Post Evaluation    Patient: Baiely Medina    Procedure(s) Performed: Procedure(s) (LRB):  LAPAROTOMY, EXPLORATORY (N/A)  CREATION, COLOSTOMY (N/A)    Final Anesthesia Type: general      Patient location during evaluation: PACU  Patient participation: Yes- Able to Participate  Level of consciousness: awake and alert  Post-procedure vital signs: reviewed and stable  Pain management: adequate  Airway patency: patent  VAISHALI mitigation strategies: Multimodal analgesia, Verification of full reversal of neuromuscular block and Extubation while patient is awake  PONV status at discharge: No PONV  Anesthetic complications: no      Cardiovascular status: blood pressure returned to baseline  Respiratory status: unassisted  Hydration status: euvolemic  Follow-up not needed.          Vitals Value Taken Time   /66 01/20/23 1600   Temp 36.3 °C (97.4 °F) 01/20/23 1600   Pulse 73 01/20/23 1600   Resp 20 01/20/23 1600   SpO2 94 % 01/20/23 1600         No case tracking events are documented in the log.      Pain/Chacorta Score: No data recorded

## 2023-01-21 NOTE — OP NOTE
[facility]                     [address] [address]    PATIENT NAME:      RADHA VALLE   YOB: 1930  CSN:               [id number]  MRN:               [id number]  ADMIT DATE:        01/20/2023 04:00:00  PHYSICIAN:         Marika Acevedo MD                          OPERATIVE REPORT      DATE OF SURGERY:        SURGEON:  Marika Acevedo MD    PREOPERATIVE DIAGNOSES:    1. Obstruction of sigmoid colon, possible mass versus stricture.   2. Urinary tract infection.   3. Fecal incontinence.    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    OPERATION:    1. Exploratory laparotomy.    2. Sigmoid colon resection.    3. Drainage of abscess.   4. Repair of fistula to the bladder.    5. Excision leiomyoma of the uterus.  6. Takedown splenic flexure.    ANESTHESIA:  General endotracheal anesthetic.    CO-SURGEON:  Romie Acevedo MD for bladder portion of the procedure.    ASSISTANT:  OR staff.    BLOOD LOSS:  150 cc.    SPECIMENS:    1. Ascites for culture.    2. Sigmoid colon.    3. Fistula to bladder.   4. Leiomyoma of the uterus.    FINDINGS:    1. Very distended colon with extremely distended cecum with serosal tear that   was repaired at the end of the case.  All colon viable.  2. Sigmoid colon obstruction related to diverticular stricture.  Abscess versus   donte diverticulum-drained.  3. 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   4. Pedunculated leiomyoma of the uterus-excised.    OPERATIVE REPORT:  The patient was brought to the OR, placed in supine position.    Anesthesia was induced.  Airway was controlled with general endotracheal   anesthetic.  The abdomen was prepped and draped in sterile manner after Blum   catheter was placed under sterile condition.  The Blum catheter contents were   feculent  in appearance.  After time-out was carried out and agreed upon,   incision was created along the midline with a 15 blade scalpel.  Subcutaneous   tissue was dissected down to fascia, which was entered.  Immediate encounter of   a moderate amount of ascites was noted.  A very distended colon was identified   and extremely distended cecum was identified flopped into the mid epigastrium.    This appeared viable but with a large serosal tear due to the distention.  The   colon was evaluated down to the sigmoid.  At the proximal sigmoid, there was a   tethered area that had a dense firm region for about 5-6 cm.  This was densely   adherent to the pelvic sidewall.  This was also densely adherent to the dome of   the bladder on the left side.  White line of Toldt was dissected free superior   to the area of adhesion and mobilization anterior and posterior to the colon was   carried out with further dissection lateral to the area of induration of the   colon using Bovie cautery or EnSeal device as appropriate.  There was enough   laxity developed to be able to use finger fracture to gently dissect the colon   away from the pelvic wall.  There was an abscess that was entered   posterolaterally.  This was an abscess versus a donte diverticulum.  Ultimately,   this was drained without complication.  Cultures were taken.  Further dissection   along the bladder was carried out using finger fracture technique.  The colon   easily came off the bladder.  An opening from the colon was identified and an   opening to the bladder was identified seemingly consistent with colovesical   fistula.      Attention was addressed toward sigmoid colon resection.  Please note that the   small and large intestine had to be removed from the abdominal cavity for   visualization.  The mesentery was isolated, staying on the mesentery, this was   high ligated with 0 Vicryl suture ligature.  The retroperitoneum was maintained   in place.  Further  dissection more distally led to the mesorectum.  Some of this   mesocolon more proximally was left against the lateral pelvic sidewall to   protect the ureter, which was anticipated to be in the area.  There was so much   dense inflammation.  Dissection along the mesorectum was carried out using   total mesorectal excision technique.  The superior hemorrhoidal artery was   identified.  An EnSeal device was used to divide the mesorectum at the point of   anticipated transection.  The mesentery and peritoneum surrounding the rectum   was ligated and divided using a combination of cautery or EnSeal device.  A TA   stapler 30 was used to ligate and divide the rectum at this transection point,   and proximally the colon had been ligated and divided with a 75 blue load ASHLEY   stapler.  The specimen was evaluated on the back table and it was confirmed that   the obstruction was related to a long diverticular stricture with some   associated inflammatory changes in the mesentery.  No distinct mass was   identified.  The mucosa of the sigmoid was normal appearing.  At this point,   attention was addressed towards determining if a fistula to the bladder truly   existed.  Sterile milk was inserted into the Blum catheter with Blum catheter   clamped and sterile milk was found to be extravasating from the bladder.  Dr. Romie Acevedo was present and he came to help with repair of the bladder   fistula.  After identifying the ureter in the retroperitoneum, its course was   identified and protected.  This went behind an area of inflammatory change   densely adherent along the left pelvic sidewall, so the area of bladder injury   was away from the ureter.  The wall of the fistula was excised with the cautery.    Otherwise, stay sutures were placed for exposure and 2-0 Vicryl sutures were   used in an inverted interrupted manner to reapproximate the mucosa primarily. An   anterior row of 2-0 Vicryl sutures was placed in a Lembert  fashion to   reapproximate the serosa with reinstallation of sterile milk.  There was no   leakage up to 200 cc with a taut bladder.  At this point, attention was   addressed towards taking down the splenic flexure. Using an EnSeal device, the   splenic flexure was completely taken down to enable enough laxity for colostomy   creation.  The cecum was evaluated.  The anticipated colon for colostomy was   opened and the colon was decompressed into the suction using a pool sucker.  A   lot of air had decompressed as well.  The cecum decompressed quite   significantly.  The serosal tear was repaired with 2-0 Vicryl suture in a simple   interrupted Lembert fashion.  The opening at the distal colon was closed with a   75 blue load stapler to prevent contamination.  At this point, the operative   field was inspected.  Hemostasis was ensured.  The abdominal cavity was   irrigated copiously with sterile warm saline.  A 19-Slovenian round drain was   placed within the pelvis lying across the area posterior to the bladder repair,   superior to the rectum and just at the area of the abscess.  This was brought   out through the right lower abdominal wall and secured with a 2-0 nylon suture.    The colostomy site was developed along the left lower quadrant, which is the   area marked out preoperatively for optimal placement. The colon was oriented   appropriately and brought through the fascia, which was able to take at least 2   fingerbreadths of mine.  Please note that the fascia was opened in a cruciate   manner anteriorly and posteriorly with muscle preservation and a Alejandra was   used to bring the colon through this site.  At this point, the fascia was   reapproximated with #1 Maxon in a running manner x2.  The subcutaneous tissue   was irrigated. 3-0 Vicryl sutures were used to close the dermis in a simple   inverted interrupted manner.  The skin was closed with staples leaving the   superior and inferior aspects of the wound  open for packing with saline   wet-to-dry.  At this point, the colostomy was matured, Brooking sutures were   placed at 12, 3, 6 and 9 o'clock with intervening 3-0 Vicryl mucocutaneous   sutures.  Stoma appliance was applied.  The patient tolerated the procedure   well.  There were no complications.        ______________________________  Marika Acevedo MD    SS/СВЕТЛАНА  DD:  01/20/2023  Time:  11:24PM  DT:  01/21/2023  Time:  12:25AM  Job #:  [id number]    cc:   Dr. Magdaleno Pruett        OPERATIVE REPORT

## 2023-01-21 NOTE — PROGRESS NOTES
Pharmacokinetic Initial Assessment: IV Vancomycin    Assessment/Plan:    Initiate intravenous vancomycin 500 mg every 24 hours  Desired empiric serum trough concentration is 15 to 20 mcg/mL  Draw vancomycin random level on 1/22/23 at 1100.  Pharmacy will continue to follow and monitor vancomycin.      Please contact pharmacy at extension [phone] with any questions regarding this assessment.     Thank you for the consult,   Taylor Rodartehouston       Patient brief summary:  Bailey Medina is a 89 y.o. female initiated on antimicrobial therapy with IV Vancomycin for treatment of suspected bacteremia    Drug Allergies:   Review of patient's allergies indicates:  No Known Allergies    Actual Body Weight:   56.7    Renal Function:   Estimated Creatinine Clearance: 18.7 mL/min (A) (based on SCr of 1.45 mg/dL (H)).,     Dialysis Method (if applicable):  N/A    CBC (last 72 hours):  Recent Labs   Lab Result Units 01/20/23  0456 01/21/23  0143   WBC x10(3)/mcL 8.2 10.2   Hgb gm/dL 11.0* 10.6*   Hct % 33.8* 33.6*   Platelet x10(3)/mcL 230 188   Mono % % 5.7 6.0   Eos % % 0.0 0.2   Basophil % % 0.4 0.1       Metabolic Panel (last 72 hours):  Recent Labs   Lab Result Units 01/20/23  0456 01/21/23  0143   Sodium Level mmol/L 130* 131*   Potassium Level mmol/L 4.1 4.2   Chloride mmol/L 93* 97*   Carbon Dioxide mmol/L 25 22*   Glucose Level mg/dL 119 169*   Blood Urea Nitrogen mg/dL 39.0* 36.0*   Creatinine mg/dL 1.53* 1.45*   Albumin Level g/dL 2.9* 2.4*   Bilirubin Total mg/dL 0.6 0.8   Alkaline Phosphatase unit/L 98 85   Aspartate Aminotransferase unit/L 25 26   Alanine Aminotransferase unit/L 16 16   Magnesium Level mg/dL  --  1.60   Phosphorus Level mg/dL  --  3.2       Drug levels (last 3 results):  No results for input(s): VANCOMYCINRA, VANCORANDOM, VANCOMYCINPE, VANCOPEAK, VANCOMYCINTR, VANCOTROUGH in the last 72 hours.    Microbiologic Results:  Microbiology Results (last 7 days)       Procedure Component Value Units Date/Time     Blood Culture [[id number]] Collected: 01/21/23 1021    Order Status: Sent Specimen: Blood, Venous Updated: 01/21/23 1027    Blood Culture [[id number]] Collected: 01/21/23 1021    Order Status: Sent Specimen: Blood, Venous Updated: 01/21/23 1027    Blood culture #2 **CANNOT BE ORDERED STAT** [[id number]]  (Abnormal) Collected: 01/20/23 0533    Order Status: Completed Specimen: Blood from Hand, Right Updated: 01/21/23 0847     GRAM STAIN Gram Positive Cocci, probable Staphylococcus      Seen in gram stain of broth only      1 of 1 Aerobic bottle positive    Gram Stain [[id number]] Collected: 01/20/23 2003    Order Status: Sent Specimen: Abscess from Abdomen Updated: 01/21/23 0017    Wound Culture [[id number]] Collected: 01/20/23 2003    Order Status: Sent Specimen: Abscess from Abdomen Updated: 01/21/23 0017    Fungal Culture [[id number]] Collected: 01/20/23 2003    Order Status: Sent Specimen: Abscess from Abdomen Updated: 01/21/23 0017    Anaerobic Culture [[id number]] Collected: 01/20/23 2003    Order Status: Sent Specimen: Abscess from Abdomen Updated: 01/21/23 0017    Gram Stain [[id number]] Collected: 01/20/23 1948    Order Status: Sent Specimen: Abscess from Abdomen Updated: 01/21/23 0016    Wound Culture [[id number]] Collected: 01/20/23 1948    Order Status: Sent Specimen: Abscess from Abdomen Updated: 01/21/23 0016    Fungal Culture [[id number]] Collected: 01/20/23 1948    Order Status: Sent Specimen: Abscess from Abdomen Updated: 01/21/23 0016    Anaerobic Culture [[id number]] Collected: 01/20/23 1948    Order Status: Sent Specimen: Abscess from Abdomen Updated: 01/21/23 0016    Blood culture #1 **CANNOT BE ORDERED STAT** [[id number]] Collected: 01/20/23 0529    Order Status: Resulted Specimen: Blood from Forearm, Right Updated: 01/20/23 0568

## 2023-01-21 NOTE — CONSULTS
[facility]  Medical Surgical Unit  General Surgery  Consult Note    Consults  Subjective:     Chief Complaint/Reason for Admission: abdominal pain, nausea, vomiting, diarrhea    History of Present Illness: 89F with acute onset of abdominal pain, nausea, vomiting, and diarrhea; h/o UTI, megadiverticulum of the sigmoid colon, htn.  She is accompanied with her son.  They note that she had diarrhea 2 days prior.  She then started to have nausea and vomiting.  Initially, it was thought she had a GI bug, but as symptoms worsened, she was brought to the ER at [place].  There was concern for colonic obstruction at the sigmoid with associated pneumatosis of the proximal colon and dilation of small intestine.  She was transferred to [facility] for surgical consultation.     Patient had a similar episode in February/March, 2023, at which time she was hospitalized in [place].  She was thought to have UTI and megadiverticulum.  I personally reviewed images from that hospitalization, and her radiographic findings were not as severe as current findings.      Patient notes she has had incontinence of stool since a spinal anesthetic was provided for ankle surgery within the past year.  This has hindered her way of life, and she now needs help round the clock.  She notes she is not independent anymore.      No current facility-administered medications on file prior to encounter.     Current Outpatient Medications on File Prior to Encounter   Medication Sig    aspirin (ECOTRIN) 81 MG EC tablet Take 81 mg by mouth.    carvediloL (COREG) 6.25 MG tablet Take 6.25 mg by mouth 2 (two) times daily.    furosemide (LASIX) 40 MG tablet Take 40 mg by mouth every morning.    ketorolac (TORADOL) 10 mg tablet Take 10 mg by mouth once.    lactulose (CHRONULAC) 10 gram/15 mL solution Take by mouth.    metOLazone (ZAROXOLYN) 5 MG tablet Take 5 mg by mouth once daily.    traZODone (DESYREL) 100 MG tablet Take 100 mg by mouth every  evening.    valsartan-hydrochlorothiazide (DIOVAN-HCT) 160-12.5 mg per tablet Take 1 tablet by mouth.       Review of patient's allergies indicates:  No Known Allergies    Past Medical History:   Diagnosis Date    Diverticulosis     Hypertension      History reviewed. No pertinent surgical history.  Family History    None       Tobacco Use    Smoking status: Never    Smokeless tobacco: Never   Substance and Sexual Activity    Alcohol use: Never    Drug use: Never    Sexual activity: Not on file   Patient is .  Her  was PCP in [place].  They shared 3 children - 2 live in [place], and one daughter lives out of state.  One son lives in front of the house.  She used to work in her 's office.  No alcohol, tobacco, or illicit drug use.     Review of Systems   Constitutional:  Positive for activity change. Negative for chills and fever.   HENT: Negative.     Respiratory:          Chronic congestion and post nasal drip   Cardiovascular: Negative.    Gastrointestinal:  Positive for abdominal distention, abdominal pain, diarrhea, nausea and vomiting.        Incontinence of stool since spinal anesthesia within this past year   Endocrine: Negative.    Genitourinary: Negative.    Musculoskeletal: Negative.    Skin: Negative.    Allergic/Immunologic: Negative.    Neurological: Negative.    Hematological: Negative.    Psychiatric/Behavioral: Negative.     Objective:     Vital Signs (Most Recent):  Temp: 97.4 °F (36.3 °C) (01/20/23 1600)  Pulse: 73 (01/20/23 1600)  Resp: 20 (01/20/23 1600)  BP: 109/66 (01/20/23 1600)  SpO2: (!) 94 % (01/20/23 1600)   Vital Signs (24h Range):  Temp:  [97.3 °F (36.3 °C)-98.1 °F (36.7 °C)] 97.4 °F (36.3 °C)  Pulse:  [70-73] 73  Resp:  [16-20] 20  SpO2:  [94 %-96 %] 94 %  BP: ()/(59-67) 109/66     Weight: 56.7 kg (125 lb)  Body mass index is 23.62 kg/m².    No intake or output data in the 24 hours ending 01/20/23 0150    Physical Exam  Constitutional:       General:  She is not in acute distress.     Comments: Hard of hearing   HENT:      Head: Normocephalic and atraumatic.      Right Ear: External ear normal.      Left Ear: External ear normal.      Nose: Nose normal.      Mouth/Throat:      Mouth: Mucous membranes are dry.   Eyes:      Extraocular Movements: Extraocular movements intact.      Conjunctiva/sclera: Conjunctivae normal.   Cardiovascular:      Rate and Rhythm: Normal rate and regular rhythm.   Abdominal:      General: There is distension.      Tenderness: There is abdominal tenderness.      Comments: Generalized ttp noted throughout   Musculoskeletal:         General: No swelling. Normal range of motion.      Cervical back: Normal range of motion and neck supple.   Skin:     General: Skin is warm and dry.   Neurological:      General: No focal deficit present.      Mental Status: She is alert and oriented to person, place, and time.   Psychiatric:         Mood and Affect: Mood normal.         Behavior: Behavior normal.       Significant Labs:  CBC:   Recent Labs   Lab 01/20/23 0456   WBC 8.2   RBC 3.84*   HGB 11.0*   HCT 33.8*      MCV 88.0   MCH 28.6   MCHC 32.5*     CMP:   Recent Labs   Lab 01/20/23 0456   CALCIUM 8.6   ALBUMIN 2.9*   *   K 4.1   CO2 25   BUN 39.0*   CREATININE 1.53*   ALKPHOS 98   ALT 16   AST 25   BILITOT 0.6     Coagulation: No results for input(s): PT, INR, APTT in the last 48 hours.    Significant Diagnostics:  CT: I have reviewed all pertinent results/findings within the past 24 hours. See below    Obstructing lesion left lower quadrant at proximal aspect of sigmoid colon rule out adenocarcinoma and local metastatic disease.  4 cm air containing fluid collection to the left of this lesion, suspicious for pericolonic abscess/contained perforation.  This is causing large bowel obstruction and secondary small bowel obstruction.  Pneumatosis of cecum/ascending colon.  No mesenteric or portal or free air.  Mild colonic thickening   wtihin the descending colon, and possibly the transverse colon and ascending colon suspicious for early stercoral colitis.      Assessment/Plan:     Active Diagnoses:    Diagnosis Date Noted POA    PRINCIPAL PROBLEM:  SBO (small bowel obstruction) [K56.609] 01/20/2023 Yes      Problems Resolved During this Admission:     89F with obstructing mass vs stricture at sigmoid colon - second hospitalization for similar presentation in one year.  Incontinence of stool since spinal anesthetic noted, and this has taken away her independence.  Recommend for:  1) To OR for exploratory laparotomy with colon resection and colostomy.  Consider cholecystectomy at time of surgery.  RBA all discussed, and she/her son would like to proceed.    2) Cont supportive care, including hydration, ngt    I appreciate the consultation and the opportunity to be involved in Mrs. Medina's care.       Deana Acevedo MD  General Surgery/Surgical Oncology  [facility] - Medical Surgical Unit

## 2023-01-21 NOTE — PT/OT/SLP EVAL
Physical Therapy Evaluation    Patient Name:  Bailey Medina   MRN:  [id number]    Recommendations:     Discharge Recommendations:     Discharge Equipment Recommendations:     Barriers to discharge: None    Assessment:     Bailey Medina is a 89 y.o. female admitted with a medical diagnosis of Colon obstruction.  She presents with the following impairments/functional limitations: weakness, impaired balance, impaired endurance, impaired functional mobility.    Pt c/o increased abdominal pain with mobility tasks and she reported feeling dizzy upon standing at EOB. Pt would benefit from skilled PT services to address her current impairments in order to improve her functional mobility and work towards returning back to her PLOF.     Rehab Prognosis: Good; patient would benefit from acute skilled PT services to address these deficits and reach maximum level of function.    Recent Surgery: Procedure(s) (LRB):  LAPAROTOMY, EXPLORATORY (N/A)  CREATION, COLOSTOMY (N/A)  CLOSURE, FISTULA, COLOVESICAL (N/A)  COLECTOMY, SIGMOID (N/A) 1 Day Post-Op    Plan:     During this hospitalization, patient to be seen daily to address the identified rehab impairments via gait training, therapeutic exercises, therapeutic activities and progress toward the following goals:    Plan of Care Expires:  02/21/23    Subjective     Chief Complaint: pain and weakness  Patient/Family Comments/goals: get stronger and improve her mobility   Pain/Comfort:  Pain Rating 1: 8/10  Location 1: abdomen    Patients cultural, spiritual, Lutheran conflicts given the current situation:      Living Environment:  Lives at home with son   Prior to admission, patients level of function was able to walk some, but main form of mobility was from w/c.  Equipment used at home: wheelchair, walker, rolling.  DME owned (not currently used): none.  Upon discharge, patient will have assistance from son.    Objective:     Communicated with patient and nursing prior to  session.  Patient found HOB elevated with campbell catheter, peripheral IV, telemetry  upon PT entry to room.    General Precautions: Standard, fall  Orthopedic Precautions:    Braces:    Respiratory Status: Room air    Exams:  RLE ROM: WFL  RLE Strength: Deficits: 3+/5  LLE ROM: WFL  LLE Strength: Deficits: 3+/5    Functional Mobility:  Bed Mobility:     Supine to Sit: maximal assistance  Sit to Supine: maximal assistance  Transfers:     Sit to Stand:  maximal assistance with rolling walker  Balance: standing x max assist       AM-PAC 6 CLICK MOBILITY  Total Score:        Treatment & Education:  See above     Patient left HOB elevated with all lines intact and call button in reach.    GOALS:   Multidisciplinary Problems       Physical Therapy Goals          Problem: Physical Therapy    Goal Priority Disciplines Outcome Goal Variances Interventions   Physical Therapy Goal     PT, PT/OT Ongoing, Progressing     Description: Goals to be met by: 23.     Patient will increase functional independence with mobility by performin. Supine to sit with MInimal Assistance  2. Bed to chair transfer with Minimal Assistance using stand and pivot transfer.                         History:     Past Medical History:   Diagnosis Date    Diverticulosis     Hypertension        History reviewed. No pertinent surgical history.    Time Tracking:     PT Received On:    PT Start Time: 0800     PT Stop Time: 0815  PT Total Time (min): 15 min     Billable Minutes: Evaluation 15 mins      2023

## 2023-01-21 NOTE — ADDENDUM NOTE
Addendum  created 01/20/23 5211 by Perry Bey CRNA    Order list changed, Pharmacy for encounter modified

## 2023-01-21 NOTE — PROGRESS NOTES
Patient is postop day 1 status post exploratory laparotomy sigmoid colon resection with diverting colostomy and repair of entero vesicle bladder fistula with drainage of intra-abdominal abscess.    Patient is postop day 1 doing well vital signs are stable afebrile patient is awake and alert responsive without any complaints or problems.  No family members are in the room at the time my evaluation.  Patient had attempts at getting out of bed sitting in the chair but feels too weak and has too much tenderness.  She is still a little fresh postop.  Ostomy is putting out appropriately incision sites look good overall she seems very comfortable CHANCE drain is putting out clear serous material    Laboratory studies show white count of 10 with hemoglobin of 10 hematocrit 33 platelet count is 188 renal profile is unremarkable BUN 36 with a creatinine of 1.45.  Patient's albumin is 2.4.    I will follow-up tomorrow morning try to get the patient out of bed into a chair.  Patient will continue antibiotics IV fluids and close management.

## 2023-01-21 NOTE — ADDENDUM NOTE
Addendum  created 01/20/23 2835 by Perry Bey CRNA    Order list changed, Pharmacy for encounter modified

## 2023-01-21 NOTE — NURSING
0110 Nurse called MD Blue about pt BP being 86/55 with HR of 77. Nurse also told MD CHANCE output was 70ml. MD order 1 L bolus of NS and 5% albumin 500ml and order labs draw. During MD rounds at 0345 MD reviewed pt /64, urine output, and CHANCE drain output. MD order another 1 L bolus of NS, keep abd binder lose.

## 2023-01-22 PROBLEM — D64.9 POSTOPERATIVE ANEMIA: Status: ACTIVE | Noted: 2023-01-22

## 2023-01-22 LAB
ABO + RH BLD: NORMAL
ABO + RH BLD: NORMAL
ALBUMIN SERPL-MCNC: 2.7 G/DL (ref 3.4–4.8)
ALBUMIN/GLOB SERPL: 1 RATIO (ref 1.1–2)
ALP SERPL-CCNC: 60 UNIT/L (ref 40–150)
ALT SERPL-CCNC: 8 UNIT/L (ref 0–55)
AST SERPL-CCNC: 21 UNIT/L (ref 5–34)
BASOPHILS # BLD AUTO: 0.03 X10(3)/MCL (ref 0–0.2)
BASOPHILS NFR BLD AUTO: 0.3 %
BILIRUBIN DIRECT+TOT PNL SERPL-MCNC: 0.5 MG/DL
BLD PROD TYP BPU: NORMAL
BLD PROD TYP BPU: NORMAL
BLOOD UNIT EXPIRATION DATE: NORMAL
BLOOD UNIT EXPIRATION DATE: NORMAL
BLOOD UNIT TYPE CODE: 6200
BLOOD UNIT TYPE CODE: 6200
BUN SERPL-MCNC: 38 MG/DL (ref 9.8–20.1)
CALCIUM SERPL-MCNC: 8 MG/DL (ref 8.4–10.2)
CHLORIDE SERPL-SCNC: 100 MMOL/L (ref 98–111)
CO2 SERPL-SCNC: 23 MMOL/L (ref 23–31)
CREAT SERPL-MCNC: 2 MG/DL (ref 0.55–1.02)
CROSSMATCH INTERPRETATION: NORMAL
CROSSMATCH INTERPRETATION: NORMAL
DISPENSE STATUS: NORMAL
DISPENSE STATUS: NORMAL
EOSINOPHIL # BLD AUTO: 0.02 X10(3)/MCL (ref 0–0.9)
EOSINOPHIL NFR BLD AUTO: 0.2 %
ERYTHROCYTE [DISTWIDTH] IN BLOOD BY AUTOMATED COUNT: 13.2 % (ref 11.5–17)
GFR SERPLBLD CREATININE-BSD FMLA CKD-EPI: 23 MLS/MIN/1.73/M2
GLOBULIN SER-MCNC: 2.7 GM/DL (ref 2.4–3.5)
GLUCOSE SERPL-MCNC: 140 MG/DL (ref 75–121)
GRAM STN SPEC: NORMAL
HCT VFR BLD AUTO: 26.5 % (ref 37–47)
HGB BLD-MCNC: 8.5 GM/DL (ref 12–16)
IMM GRANULOCYTES # BLD AUTO: 0.08 X10(3)/MCL (ref 0–0.04)
IMM GRANULOCYTES NFR BLD AUTO: 0.7 %
LYMPHOCYTES # BLD AUTO: 0.67 X10(3)/MCL (ref 0.6–4.6)
LYMPHOCYTES NFR BLD AUTO: 5.6 %
MAGNESIUM SERPL-MCNC: 1.5 MG/DL (ref 1.6–2.6)
MCH RBC QN AUTO: 28.5 PG
MCHC RBC AUTO-ENTMCNC: 32.1 MG/DL (ref 33–36)
MCV RBC AUTO: 88.9 FL (ref 80–94)
MONOCYTES # BLD AUTO: 0.42 X10(3)/MCL (ref 0.1–1.3)
MONOCYTES NFR BLD AUTO: 3.5 %
NEUTROPHILS # BLD AUTO: 10.66 X10(3)/MCL (ref 2.1–9.2)
NEUTROPHILS NFR BLD AUTO: 89.7 %
PHOSPHATE SERPL-MCNC: 3.1 MG/DL (ref 2.3–4.7)
PLATELET # BLD AUTO: 190 X10(3)/MCL (ref 130–400)
PMV BLD AUTO: 10.3 FL (ref 7.4–10.4)
POTASSIUM SERPL-SCNC: 4.2 MMOL/L (ref 3.5–5.1)
PROT SERPL-MCNC: 5.4 GM/DL (ref 5.8–7.6)
RBC # BLD AUTO: 2.98 X10(6)/MCL (ref 4.2–5.4)
SODIUM SERPL-SCNC: 132 MMOL/L (ref 132–146)
UNIT NUMBER: NORMAL
UNIT NUMBER: NORMAL
VANCOMYCIN SERPL-MCNC: 5.4 UG/ML (ref 15–20)
WBC # SPEC AUTO: 11.9 X10(3)/MCL (ref 4.5–11.5)

## 2023-01-22 PROCEDURE — 94761 N-INVAS EAR/PLS OXIMETRY MLT: CPT

## 2023-01-22 PROCEDURE — 25000003 PHARM REV CODE 250: Performed by: SURGERY

## 2023-01-22 PROCEDURE — 25000003 PHARM REV CODE 250: Performed by: INTERNAL MEDICINE

## 2023-01-22 PROCEDURE — 36415 COLL VENOUS BLD VENIPUNCTURE: CPT | Performed by: INTERNAL MEDICINE

## 2023-01-22 PROCEDURE — 80053 COMPREHEN METABOLIC PANEL: CPT | Performed by: SURGERY

## 2023-01-22 PROCEDURE — 63600175 PHARM REV CODE 636 W HCPCS: Performed by: SURGERY

## 2023-01-22 PROCEDURE — 84100 ASSAY OF PHOSPHORUS: CPT | Performed by: SURGERY

## 2023-01-22 PROCEDURE — 83735 ASSAY OF MAGNESIUM: CPT | Performed by: SURGERY

## 2023-01-22 PROCEDURE — 97530 THERAPEUTIC ACTIVITIES: CPT

## 2023-01-22 PROCEDURE — P9016 RBC LEUKOCYTES REDUCED: HCPCS | Performed by: INTERNAL MEDICINE

## 2023-01-22 PROCEDURE — 63600175 PHARM REV CODE 636 W HCPCS: Performed by: INTERNAL MEDICINE

## 2023-01-22 PROCEDURE — 36415 COLL VENOUS BLD VENIPUNCTURE: CPT | Performed by: SURGERY

## 2023-01-22 PROCEDURE — 36430 TRANSFUSION BLD/BLD COMPNT: CPT

## 2023-01-22 PROCEDURE — 11000001 HC ACUTE MED/SURG PRIVATE ROOM

## 2023-01-22 PROCEDURE — 80202 ASSAY OF VANCOMYCIN: CPT | Performed by: INTERNAL MEDICINE

## 2023-01-22 PROCEDURE — 85025 COMPLETE CBC W/AUTO DIFF WBC: CPT | Performed by: SURGERY

## 2023-01-22 PROCEDURE — 99900035 HC TECH TIME PER 15 MIN (STAT)

## 2023-01-22 RX ORDER — HYDROCODONE BITARTRATE AND ACETAMINOPHEN 500; 5 MG/1; MG/1
TABLET ORAL
Status: DISCONTINUED | OUTPATIENT
Start: 2023-01-22 | End: 2023-01-30 | Stop reason: HOSPADM

## 2023-01-22 RX ORDER — FUROSEMIDE 10 MG/ML
40 INJECTION INTRAMUSCULAR; INTRAVENOUS ONCE
Status: COMPLETED | OUTPATIENT
Start: 2023-01-22 | End: 2023-01-22

## 2023-01-22 RX ADMIN — PIPERACILLIN AND TAZOBACTAM 4.5 G: 4; .5 INJECTION, POWDER, LYOPHILIZED, FOR SOLUTION INTRAVENOUS; PARENTERAL at 05:01

## 2023-01-22 RX ADMIN — VANCOMYCIN HYDROCHLORIDE 500 MG: 500 INJECTION, POWDER, LYOPHILIZED, FOR SOLUTION INTRAVENOUS at 11:01

## 2023-01-22 RX ADMIN — FUROSEMIDE 40 MG: 10 INJECTION, SOLUTION INTRAMUSCULAR; INTRAVENOUS at 04:01

## 2023-01-22 RX ADMIN — MUPIROCIN: 20 OINTMENT TOPICAL at 09:01

## 2023-01-22 RX ADMIN — SODIUM CHLORIDE, SODIUM LACTATE, POTASSIUM CHLORIDE, CALCIUM CHLORIDE AND DEXTROSE MONOHYDRATE: 5; 600; 310; 30; 20 INJECTION, SOLUTION INTRAVENOUS at 09:01

## 2023-01-22 RX ADMIN — ENOXAPARIN SODIUM 30 MG: 30 INJECTION SUBCUTANEOUS at 04:01

## 2023-01-22 RX ADMIN — FAMOTIDINE 20 MG: 10 INJECTION INTRAVENOUS at 09:01

## 2023-01-22 RX ADMIN — PIPERACILLIN AND TAZOBACTAM 4.5 G: 4; .5 INJECTION, POWDER, LYOPHILIZED, FOR SOLUTION INTRAVENOUS; PARENTERAL at 09:01

## 2023-01-22 NOTE — PLAN OF CARE
Problem: Adult Inpatient Plan of Care  Goal: Plan of Care Review  Outcome: Ongoing, Progressing  Goal: Patient-Specific Goal (Individualized)  Outcome: Ongoing, Progressing  Goal: Absence of Hospital-Acquired Illness or Injury  Outcome: Ongoing, Progressing  Goal: Optimal Comfort and Wellbeing  Outcome: Ongoing, Progressing  Goal: Readiness for Transition of Care  Outcome: Ongoing, Progressing     Problem: Skin Injury Risk Increased  Goal: Skin Health and Integrity  Outcome: Ongoing, Progressing     Problem: Infection  Goal: Absence of Infection Signs and Symptoms  Outcome: Ongoing, Progressing     Problem: Fall Injury Risk  Goal: Absence of Fall and Fall-Related Injury  Outcome: Ongoing, Progressing     Problem: Fluid and Electrolyte Imbalance (Acute Kidney Injury/Impairment)  Goal: Fluid and Electrolyte Balance  Outcome: Ongoing, Progressing     Problem: Oral Intake Inadequate (Acute Kidney Injury/Impairment)  Goal: Optimal Nutrition Intake  Outcome: Ongoing, Progressing     Problem: Renal Function Impairment (Acute Kidney Injury/Impairment)  Goal: Effective Renal Function  Outcome: Ongoing, Progressing     Problem: Balance Impairment (Functional Deficit)  Goal: Improved Balance and Postural Control  Outcome: Ongoing, Progressing     Problem: Muscle Strength Impairment (Functional Deficit)  Goal: Improved Muscle Strength  Outcome: Ongoing, Progressing

## 2023-01-22 NOTE — HOSPITAL COURSE
"1/21/23  - Pt taken to OR yest for ExLap with findings of Obstruction of sigmoid colon secondary to diverticular stricture with colovesical fistula resulting in 1.Exploratory laparotomy.  2.Sigmoid colon resection.  3.Drainage of abscess. 4.Repair of fistula to the bladder.  5.Excision leiomyoma of the uterus. 6.Takedown splenic flexure.  Pt feels "OK" today with abd pain from surgery.     1/22/23  - No new complaints today. Still not feeling well with abd pain when she moves around, otherwise comfortable at rest. Hgb 8.5 from 10.6. She has been somewhat hypOtensive and BUN/Cr slightly increased.  Will transfuse 2u PRBC's which will also give volume expansion and hopefully increased renal perfusion.    1/23/23  - NGT placed. No acute complaints. Cr 1.81 improved from 2.00 yest.    1/24/23  - Feeling better. Cr 1.29 from 1.81.    1/25/23  - No acute complaints. Passing some flatus. Cr 1.07 from 1.29.    1/26/23-Patient states some flatus.  Still has NG tube in place but hopefully surgery will remove soon.      1/27/23-NG tube has been removed.  She was started on clears and appears to be tolerating at this time.  Will continue with current meds and treatment.    1/28/23-Patient is doing well at this time.  She is tolerating a clear liquid diet.  Her Mag is low which will be replaced.      1/29  - No new complaints. Feels rather well. Jane PO. Labs stable. Home soon.    1/30  - Feels about at baseline. No acute complaints or overnight events. Cont to jane PO. Having Bms. Cr normalized now 0.68. H/H stable at 10.6/32.9.  Cleared for discharge by GenSur. Will discharge in improved/stable condition to home with .  "

## 2023-01-22 NOTE — PROGRESS NOTES
"[facility]    Patient Name: Bailey Medina  Age: 89 y.o.   MRN: [id number]  Admission Date: 1/20/2023  4:00 AM   Patient Class: IP- Inpatient  Benefits: Payor: MEDICARE / Plan: MEDICARE PART A & B / Product Type: Government /    Primary Care Provider: Magdaleno Pruett MD   Pharmacy:   [facility] [address]  [address] [address]  Phone: [phone] Fax: [phone]    Code Status: Full Code      Chief Complaint:   Chief Complaint   Patient presents with    Abdominal Pain     Pt transferred from [facility], dx with SBO        Admit Diagnosis:   Dehydration [E86.0]  Small bowel obstruction [K56.609]  Large bowel obstruction [K56.609]  Nausea and vomiting, unspecified vomiting type [R11.2]     HPI:  89yo WF with h/o CAD, PVD, HTN, megadiverticulum initially presented to ED in [place] c/o Abd Pain with Liquid Diarrhea x2 days. She began having N/V as well and decided to seek care. Workup there with CT A/P suggested SBO/Colon Obstruction and Pneumotosis. She was then transferred here to [facility] in [place] for Surgical evaluation. Pt c/o abd pain controlled with current analgesia, thirst and some nausea. Labs reveal dehydration. WBC nml. NGT to suction. Awaiting GenSurg eval.    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  2.Sigmoid colon resection.  3.Drainage of abscess. 4.Repair of fistula to the bladder.  5.Excision leiomyoma of the uterus. 6.Takedown splenic flexure.  Pt feels "OK" today with abd pain from surgery.             Scheduled Meds:   enoxaparin  30 mg Subcutaneous Daily    famotidine (PF)  20 mg Intravenous " Daily    mupirocin   Nasal BID    piperacillin-tazobactam (ZOSYN) IVPB  4.5 g Intravenous Q12H    sodium chloride 0.9%  1,000 mL Intravenous Once    vancomycin (VANCOCIN) IVPB  500 mg Intravenous Q12H     Continuous Infusions:   dextrose 5% lactated ringers 125 mL/hr at 01/21/23 0027     PRN Meds:   sodium chloride    diphenhydrAMINE    HYDROmorphone    HYDROmorphone    melatonin    naloxone    ondansetron    ondansetron    oxyCODONE    sodium chloride 0.9%    vancomycin - pharmacy to dose      Lines/Drains:   Lines/Drains/Airways       Drain  Duration                  Closed/Suction Drain 01/20/23 RLQ Bulb 2 days         Colostomy 01/20/23 LUQ 2 days         Urethral Catheter 01/20/23 Silicone 2 days                          OBJECTIVE:     Vitals reviewed    Physical Exam  Constitutional:       General: She is awake. She is not in acute distress.     Appearance: She is well-developed. She is not ill-appearing.   HENT:      Mouth/Throat:      Mouth: Mucous membranes are dry.      Pharynx: Oropharynx is clear.   Cardiovascular:      Rate and Rhythm: Normal rate and regular rhythm.   Pulmonary:      Effort: Pulmonary effort is normal.      Breath sounds: Normal breath sounds.   Abdominal:      Palpations: Abdomen is soft.      Tenderness: There is abdominal tenderness.   Skin:     General: Skin is warm and dry.   Neurological:      General: No focal deficit present.      Mental Status: She is alert and oriented to person, place, and time. Mental status is at baseline.   Psychiatric:         Behavior: Behavior is cooperative.        Recent Labs   Lab 01/20/23  0456 01/21/23  0143   WBC 8.2 10.2   HGB 11.0* 10.6*   HCT 33.8* 33.6*    188   MCV 88.0 89.8        Recent Labs   Lab 01/20/23  0456 01/21/23  0143   * 131*   K 4.1 4.2   CHLORIDE 93* 97*   CO2 25 22*   BUN 39.0* 36.0*   CREATININE 1.53* 1.45*   GLUCOSE 119 169*   MG  --  1.60   PHOS  --  3.2   CALCIUM 8.6 7.9*   ALBUMIN 2.9* 2.4*   BILITOT 0.6 0.8    ALKPHOS 98 85   AST 25 26   ALT 16 16             ASSESSMENT/PLAN:       Colon obstruction    TYLER (acute kidney injury)    Dehydration    CAD (coronary artery disease)    PVD (peripheral vascular disease)    Hypertension       - IVF  - analgesia  - NGT to ILWS  - GenSurg post-op mgt  - monitor labs        VTE Risk Mitigation (From admission, onward)           Ordered     enoxaparin injection 30 mg  Daily         01/20/23 2310     IP VTE HIGH RISK PATIENT  Once         01/20/23 2310     Place sequential compression device  Until discontinued         01/20/23 0633     Place RADHIKA hose  Until discontinued         01/20/23 0633                           Sachin De Oliveira MD  [facility] Medicine   [facility]

## 2023-01-22 NOTE — HPI
89yo WF with h/o CAD, PVD, HTN, megadiverticulum initially presented to ED in [place] c/o Abd Pain with Liquid Diarrhea x2 days. She began having N/V as well and decided to seek care. Workup there with CT A/P suggested SBO/Colon Obstruction and Pneumotosis. She was then transferred here to [facility] in [place] for Surgical evaluation. Pt c/o abd pain controlled with current analgesia, thirst and some nausea. Labs reveal dehydration. WBC nml. NGT to suction. Awaiting GenSurg eval.

## 2023-01-22 NOTE — SUBJECTIVE & OBJECTIVE
Past Medical History:   Diagnosis Date    Diverticulosis     Hypertension        History reviewed. No pertinent surgical history.    Review of patient's allergies indicates:  No Known Allergies    No current facility-administered medications on file prior to encounter.     Current Outpatient Medications on File Prior to Encounter   Medication Sig    aspirin (ECOTRIN) 81 MG EC tablet Take 81 mg by mouth.    carvediloL (COREG) 6.25 MG tablet Take 6.25 mg by mouth 2 (two) times daily.    furosemide (LASIX) 40 MG tablet Take 40 mg by mouth every morning.    ketorolac (TORADOL) 10 mg tablet Take 10 mg by mouth once.    lactulose (CHRONULAC) 10 gram/15 mL solution Take by mouth.    metOLazone (ZAROXOLYN) 5 MG tablet Take 5 mg by mouth once daily.    traZODone (DESYREL) 100 MG tablet Take 100 mg by mouth every evening.    valsartan-hydrochlorothiazide (DIOVAN-HCT) 160-12.5 mg per tablet Take 1 tablet by mouth.     Family History    None       Tobacco Use    Smoking status: Never    Smokeless tobacco: Never   Substance and Sexual Activity    Alcohol use: Never    Drug use: Never    Sexual activity: Not on file     Review of Systems: 10 systems reviewed all pertinent positives and negatives stated in HPI, otherwise negative.    Objective:     Vitals reviewed    Weight: 56.7 kg (125 lb)  Body mass index is 23.62 kg/m².    Physical Exam  Constitutional:       General: She is awake. She is not in acute distress.     Appearance: She is well-developed and underweight. She is ill-appearing. She is not toxic-appearing.   HENT:      Mouth/Throat:      Mouth: Mucous membranes are dry.      Pharynx: Oropharynx is clear.   Cardiovascular:      Rate and Rhythm: Normal rate and regular rhythm.   Pulmonary:      Effort: Pulmonary effort is normal.      Breath sounds: Normal breath sounds.   Abdominal:      General: Bowel sounds are increased. There is distension.      Palpations: Abdomen is soft.      Tenderness: There is generalized  abdominal tenderness and tenderness in the epigastric area, periumbilical area, suprapubic area, left upper quadrant and left lower quadrant.   Skin:     General: Skin is warm and dry.   Neurological:      General: No focal deficit present.      Mental Status: She is alert and oriented to person, place, and time. Mental status is at baseline.   Psychiatric:         Mood and Affect: Mood normal.         Behavior: Behavior normal. Behavior is cooperative.         Thought Content: Thought content normal.         Judgment: Judgment normal.          Significant Labs: All pertinent labs within the past 24 hours have been reviewed.    Significant Imaging: I have reviewed all pertinent imaging results/findings within the past 24 hours.

## 2023-01-22 NOTE — PROGRESS NOTES
"[facility] Medicine Progress Note    Patient Name: Bailey Medina  Age: 89 y.o.   MRN: [id number]  Admission Date: 1/20/2023  4:00 AM   Patient Class: IP- Inpatient  Benefits: Payor: MEDICARE / Plan: MEDICARE PART A & B / Product Type: Government /    Primary Care Provider: Magdaleno Pruett MD   Pharmacy:   [facility] [address]  [address] [address]  Phone: [phone] Fax: [phone]    Code Status: Full Code      Chief Complaint:   Chief Complaint   Patient presents with    Abdominal Pain     Pt transferred from [facility], dx with SBO        Admit Diagnosis:   Dehydration [E86.0]  Small bowel obstruction [K56.609]  Large bowel obstruction [K56.609]  Nausea and vomiting, unspecified vomiting type [R11.2]     HPI:  89yo WF with h/o CAD, PVD, HTN, megadiverticulum initially presented to ED in [place] c/o Abd Pain with Liquid Diarrhea x2 days. She began having N/V as well and decided to seek care. Workup there with CT A/P suggested SBO/Colon Obstruction and Pneumotosis. She was then transferred here to [facility] in [place] for Surgical evaluation. Pt c/o abd pain controlled with current analgesia, thirst and some nausea. Labs reveal dehydration. WBC nml. NGT to suction. Awaiting GenSurg eval.    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  2.Sigmoid colon resection.  3.Drainage of abscess. 4.Repair of fistula to the bladder.  5.Excision leiomyoma of the uterus. 6.Takedown splenic flexure.  Pt feels "OK" today with abd pain from surgery.     1/22/23  - No new complaints today. Still not feeling well with abd pain when she moves around, " otherwise comfortable at rest. Hgb 8.5 from 10.6. She has been somewhat hypOtensive and BUN/Cr slightly increased.  Will transfuse 2u PRBC's which will also give volume expansion and hopefully increased renal perfusion.         OBJECTIVE:     Vital Signs (Most Recent)  Temp: 97.4 °F (36.3 °C) (01/22/23 1646)  Pulse: 88 (01/22/23 1646)  Resp: 18 (01/22/23 1646)  BP: 118/68 (01/22/23 1646)  SpO2: (!) 94 % (01/22/23 1646)      Vital Signs Range (Last 24H):  Temp:  [97.4 °F (36.3 °C)-98.5 °F (36.9 °C)]   Pulse:  [83-96]   Resp:  [18-20]   BP: ()/(55-74)   SpO2:  [94 %-98 %]  .vi    I & O (Last 24H):  Intake/Output Summary (Last 24 hours) at 1/22/2023 1721  Last data filed at 1/22/2023 1632  Gross per 24 hour   Intake 381.25 ml   Output 735 ml   Net -353.75 ml         Scheduled Meds:   enoxaparin  30 mg Subcutaneous Daily    famotidine (PF)  20 mg Intravenous Daily    mupirocin   Nasal BID    piperacillin-tazobactam (ZOSYN) IVPB  4.5 g Intravenous Q12H    sodium chloride 0.9%  1,000 mL Intravenous Once    vancomycin (VANCOCIN) IVPB  500 mg Intravenous Q12H     Continuous Infusions:   dextrose 5% lactated ringers 125 mL/hr at 01/21/23 0027     PRN Meds:   sodium chloride    diphenhydrAMINE    HYDROmorphone    HYDROmorphone    melatonin    naloxone    ondansetron    ondansetron    oxyCODONE    sodium chloride 0.9%    vancomycin - pharmacy to dose      Lines/Drains:   Lines/Drains/Airways       Drain  Duration                  Closed/Suction Drain 01/20/23 RLQ Bulb 2 days         Colostomy 01/20/23 LUQ 2 days         Urethral Catheter 01/20/23 Silicone 2 days                        Physical Exam  Constitutional:       General: She is awake. She is not in acute distress.     Appearance: She is well-developed. She is not ill-appearing.   HENT:      Mouth/Throat:      Mouth: Mucous membranes are dry.      Pharynx: Oropharynx is clear.   Cardiovascular:      Rate and Rhythm: Normal rate and regular rhythm.   Pulmonary:       Effort: Pulmonary effort is normal.      Breath sounds: Normal breath sounds.   Abdominal:      Palpations: Abdomen is soft.      Tenderness: There is abdominal tenderness.   Skin:     General: Skin is warm and dry.   Neurological:      General: No focal deficit present.      Mental Status: She is alert and oriented to person, place, and time. Mental status is at baseline.   Psychiatric:         Behavior: Behavior is cooperative.        Recent Labs   Lab 01/20/23 0456 01/21/23 0143 01/22/23  0438   WBC 8.2 10.2 11.9*   HGB 11.0* 10.6* 8.5*   HCT 33.8* 33.6* 26.5*    188 190   MCV 88.0 89.8 88.9        Recent Labs   Lab 01/20/23 0456 01/21/23 0143 01/22/23  0438   * 131* 132   K 4.1 4.2 4.2   CHLORIDE 93* 97* 100   CO2 25 22* 23   BUN 39.0* 36.0* 38.0*   CREATININE 1.53* 1.45* 2.00*   GLUCOSE 119 169* 140*   MG  --  1.60 1.50*   PHOS  --  3.2 3.1   CALCIUM 8.6 7.9* 8.0*   ALBUMIN 2.9* 2.4* 2.7*   BILITOT 0.6 0.8 0.5   ALKPHOS 98 85 60   AST 25 26 21   ALT 16 16 8               ASSESSMENT/PLAN:       Colon obstruction    TYLER (acute kidney injury)    Dehydration    Postoperative anemia    CAD (coronary artery disease)    PVD (peripheral vascular disease)    Hypertension       - transfuse 2u PRBC's  - IVF  - monitor labs  - analgesia  - GenSurg mgt  - am labs  - abx IV        VTE Risk Mitigation (From admission, onward)           Ordered     enoxaparin injection 30 mg  Daily         01/20/23 2310     IP VTE HIGH RISK PATIENT  Once         01/20/23 2310     Place sequential compression device  Until discontinued         01/20/23 0633     Place RADHIKA hose  Until discontinued         01/20/23 0633                           Sachin De Oliveira MD  [facility] Medicine   [facility]

## 2023-01-22 NOTE — H&P
[facility] - Medical Surgical Unit  [facility] Medicine  History & Physical    Patient Name: Bailey Medina  MRN: [id number]  Patient Class: IP- Inpatient  Admission Date: 1/20/2023  Attending Physician: Sachin De Oliveira MD  Primary Care Provider: Magdaleno Pruett MD         Patient information was obtained from patient, relative(s), past medical records and ER records.     Subjective:     Principal Problem:Colon obstruction    Chief Complaint:   Chief Complaint   Patient presents with    Abdominal Pain     Pt transferred from [facility], dx with SBO        HPI: 89yo WF with h/o CAD, PVD, HTN, megadiverticulum initially presented to ED in [place] c/o Abd Pain with Liquid Diarrhea x2 days. She began having N/V as well and decided to seek care. Workup there with CT A/P suggested SBO/Colon Obstruction and Pneumotosis. She was then transferred her to [facility] in [place] for Surgical evaluation. Pt c/o abd pain controlled with current analgesia, thirst and some nausea. Labs reveal dehydration. WBC nml. NGT to suction. Awaiting GenSurg eval.       Past Medical History:   Diagnosis Date    Diverticulosis     Hypertension        History reviewed. No pertinent surgical history.    Review of patient's allergies indicates:  No Known Allergies    No current facility-administered medications on file prior to encounter.     Current Outpatient Medications on File Prior to Encounter   Medication Sig    aspirin (ECOTRIN) 81 MG EC tablet Take 81 mg by mouth.    carvediloL (COREG) 6.25 MG tablet Take 6.25 mg by mouth 2 (two) times daily.    furosemide (LASIX) 40 MG tablet Take 40 mg by mouth every morning.    ketorolac (TORADOL) 10 mg tablet Take 10 mg by mouth once.    lactulose (CHRONULAC) 10 gram/15 mL solution Take by mouth.    metOLazone (ZAROXOLYN) 5 MG tablet Take 5 mg by mouth once daily.    traZODone (DESYREL) 100 MG tablet Take 100 mg by mouth every evening.     valsartan-hydrochlorothiazide (DIOVAN-HCT) 160-12.5 mg per tablet Take 1 tablet by mouth.     Family History    None       Tobacco Use    Smoking status: Never    Smokeless tobacco: Never   Substance and Sexual Activity    Alcohol use: Never    Drug use: Never    Sexual activity: Not on file     Review of Systems: 10 systems reviewed all pertinent positives and negatives stated in HPI, otherwise negative.    Objective:     Vitals reviewed    Weight: 56.7 kg (125 lb)  Body mass index is 23.62 kg/m².    Physical Exam  Constitutional:       General: She is awake. She is not in acute distress.     Appearance: She is well-developed and underweight. She is ill-appearing. She is not toxic-appearing.   HENT:      Mouth/Throat:      Mouth: Mucous membranes are dry.      Pharynx: Oropharynx is clear.   Cardiovascular:      Rate and Rhythm: Normal rate and regular rhythm.   Pulmonary:      Effort: Pulmonary effort is normal.      Breath sounds: Normal breath sounds.   Abdominal:      General: Bowel sounds are increased. There is distension.      Palpations: Abdomen is soft.      Tenderness: There is generalized abdominal tenderness and tenderness in the epigastric area, periumbilical area, suprapubic area, left upper quadrant and left lower quadrant.   Skin:     General: Skin is warm and dry.   Neurological:      General: No focal deficit present.      Mental Status: She is alert and oriented to person, place, and time. Mental status is at baseline.   Psychiatric:         Mood and Affect: Mood normal.         Behavior: Behavior normal. Behavior is cooperative.         Thought Content: Thought content normal.         Judgment: Judgment normal.          Significant Labs: All pertinent labs within the past 24 hours have been reviewed.    Significant Imaging: I have reviewed all pertinent imaging results/findings within the past 24 hours.    Assessment/Plan:       Colon obstruction    TYLER (acute kidney injury)     Dehydration    CAD (coronary artery disease)    PVD (peripheral vascular disease)    Hypertension    - NPO  - NGT to ILWS  - analgesia  - IVF  - awaiting GenSurg eval      VTE Risk Mitigation (From admission, onward)         Ordered     enoxaparin injection 30 mg  Daily         01/20/23 2310     IP VTE HIGH RISK PATIENT  Once         01/20/23 2310     Place sequential compression device  Until discontinued         01/20/23 0633     Place RADHIKA hose  Until discontinued         01/20/23 0633                   Sachin De Oliveira MD  Department of Hospital Medicine   [facility] - Medical Surgical Unit

## 2023-01-22 NOTE — PT/OT/SLP EVAL
Physical Therapy Evaluation    Patient Name:  Bailey Medina   MRN:  [id number]    Recommendations:     Discharge Recommendations:     Discharge Equipment Recommendations:     Barriers to discharge: None    Assessment:     Bailey Medina is a 89 y.o. female admitted with a medical diagnosis of Colon obstruction.  She presents with the following impairments/functional limitations: weakness, impaired balance, impaired endurance, impaired functional mobility.    Pt reported feeling dizzy upon sitting at EOB and with standing at EOB, and also noted having increased abdominal pain with mobility tasks. Pt demonstrated improved independence with mobility tasks progressing to min assist with bed mobility and required min assist to stand at EOB.     Rehab Prognosis: Good; patient would benefit from acute skilled PT services to address these deficits and reach maximum level of function.    Recent Surgery: Procedure(s) (LRB):  LAPAROTOMY, EXPLORATORY (N/A)  CREATION, COLOSTOMY (N/A)  CLOSURE, FISTULA, COLOVESICAL (N/A)  COLECTOMY, SIGMOID (N/A) 2 Days Post-Op    Plan:     During this hospitalization, patient to be seen daily to address the identified rehab impairments via gait training, therapeutic exercises, therapeutic activities and progress toward the following goals:    Plan of Care Expires:  02/21/23    Subjective     Chief Complaint: pain and weakness  Patient/Family Comments/goals: get stronger and improve her mobility   Pain/Comfort:   8/10 in abdominal region    Patients cultural, spiritual, Yazdanism conflicts given the current situation:      Living Environment:  Lives at home with son   Prior to admission, patients level of function was able to walk some, but main form of mobility was from w/c.  Equipment used at home: wheelchair, walker, rolling.  DME owned (not currently used): none.  Upon discharge, patient will have assistance from son.    Objective:     Communicated with patient and nursing prior to  session.  Patient found HOB elevated with campbell catheter, peripheral IV, telemetry  upon PT entry to room.    General Precautions: Standard, fall  Orthopedic Precautions:    Braces:    Respiratory Status: Room air      Functional Mobility:  Bed Mobility:     Supine to Sit: minimal assistance  Sit to Supine: minimal assistance  Transfers:     Sit to Stand:  maximal assistance with rolling walker  Balance: standing x min assist with rolling walker      AM-PAC 6 CLICK MOBILITY  Total Score:        Treatment & Education:  See above     Patient left HOB elevated with all lines intact and call button in reach.    GOALS:   Multidisciplinary Problems       Physical Therapy Goals          Problem: Physical Therapy    Goal Priority Disciplines Outcome Goal Variances Interventions   Physical Therapy Goal     PT, PT/OT Ongoing, Progressing     Description: Goals to be met by: 23.     Patient will increase functional independence with mobility by performin. Supine to sit with MInimal Assistance  2. Bed to chair transfer with Minimal Assistance using stand and pivot transfer.                         History:     Past Medical History:   Diagnosis Date    Diverticulosis     Hypertension        History reviewed. No pertinent surgical history.    Time Tracking:     PT Received On:  2023  PT Start Time: 0810    PT Stop Time: 0825  PT Total Time (min): 15 min     Billable Minutes: Therapeutic Activity 15 mins      2023

## 2023-01-23 LAB
ALBUMIN SERPL-MCNC: 2.5 G/DL (ref 3.4–4.8)
ALBUMIN/GLOB SERPL: 0.8 RATIO (ref 1.1–2)
ALP SERPL-CCNC: 84 UNIT/L (ref 40–150)
ALT SERPL-CCNC: 5 UNIT/L (ref 0–55)
AST SERPL-CCNC: 20 UNIT/L (ref 5–34)
BACTERIA BLD CULT: ABNORMAL
BASOPHILS # BLD AUTO: 0.04 X10(3)/MCL (ref 0–0.2)
BASOPHILS NFR BLD AUTO: 0.4 %
BILIRUBIN DIRECT+TOT PNL SERPL-MCNC: 0.6 MG/DL
BUN SERPL-MCNC: 37 MG/DL (ref 9.8–20.1)
CALCIUM SERPL-MCNC: 8.3 MG/DL (ref 8.4–10.2)
CHLORIDE SERPL-SCNC: 100 MMOL/L (ref 98–111)
CO2 SERPL-SCNC: 23 MMOL/L (ref 23–31)
CREAT SERPL-MCNC: 1.81 MG/DL (ref 0.55–1.02)
EOSINOPHIL # BLD AUTO: 0.05 X10(3)/MCL (ref 0–0.9)
EOSINOPHIL NFR BLD AUTO: 0.4 %
ERYTHROCYTE [DISTWIDTH] IN BLOOD BY AUTOMATED COUNT: 14.1 % (ref 11.5–17)
GFR SERPLBLD CREATININE-BSD FMLA CKD-EPI: 26 MLS/MIN/1.73/M2
GLOBULIN SER-MCNC: 3.1 GM/DL (ref 2.4–3.5)
GLUCOSE SERPL-MCNC: 222 MG/DL (ref 75–121)
GRAM STN SPEC: ABNORMAL
HCT VFR BLD AUTO: 33.3 % (ref 37–47)
HGB BLD-MCNC: 11 GM/DL (ref 12–16)
IMM GRANULOCYTES # BLD AUTO: 0.06 X10(3)/MCL (ref 0–0.04)
IMM GRANULOCYTES NFR BLD AUTO: 0.5 %
LYMPHOCYTES # BLD AUTO: 0.72 X10(3)/MCL (ref 0.6–4.6)
LYMPHOCYTES NFR BLD AUTO: 6.4 %
MAGNESIUM SERPL-MCNC: 1.5 MG/DL (ref 1.6–2.6)
MCH RBC QN AUTO: 28.7 PG
MCHC RBC AUTO-ENTMCNC: 33 MG/DL (ref 33–36)
MCV RBC AUTO: 86.9 FL (ref 80–94)
MONOCYTES # BLD AUTO: 0.48 X10(3)/MCL (ref 0.1–1.3)
MONOCYTES NFR BLD AUTO: 4.3 %
NEUTROPHILS # BLD AUTO: 9.93 X10(3)/MCL (ref 2.1–9.2)
NEUTROPHILS NFR BLD AUTO: 88 %
PHOSPHATE SERPL-MCNC: 2.7 MG/DL (ref 2.3–4.7)
PLATELET # BLD AUTO: 224 X10(3)/MCL (ref 130–400)
PMV BLD AUTO: 10.4 FL (ref 7.4–10.4)
POTASSIUM SERPL-SCNC: 3.7 MMOL/L (ref 3.5–5.1)
PROT SERPL-MCNC: 5.6 GM/DL (ref 5.8–7.6)
RBC # BLD AUTO: 3.83 X10(6)/MCL (ref 4.2–5.4)
SODIUM SERPL-SCNC: 134 MMOL/L (ref 132–146)
WBC # SPEC AUTO: 11.3 X10(3)/MCL (ref 4.5–11.5)

## 2023-01-23 PROCEDURE — 63600175 PHARM REV CODE 636 W HCPCS: Performed by: INTERNAL MEDICINE

## 2023-01-23 PROCEDURE — 83735 ASSAY OF MAGNESIUM: CPT | Performed by: SURGERY

## 2023-01-23 PROCEDURE — 97530 THERAPEUTIC ACTIVITIES: CPT

## 2023-01-23 PROCEDURE — 63600175 PHARM REV CODE 636 W HCPCS: Performed by: SURGERY

## 2023-01-23 PROCEDURE — 80053 COMPREHEN METABOLIC PANEL: CPT | Performed by: SURGERY

## 2023-01-23 PROCEDURE — 25000003 PHARM REV CODE 250: Performed by: SURGERY

## 2023-01-23 PROCEDURE — 85025 COMPLETE CBC W/AUTO DIFF WBC: CPT | Performed by: SURGERY

## 2023-01-23 PROCEDURE — 84100 ASSAY OF PHOSPHORUS: CPT | Performed by: SURGERY

## 2023-01-23 PROCEDURE — 36415 COLL VENOUS BLD VENIPUNCTURE: CPT | Performed by: SURGERY

## 2023-01-23 PROCEDURE — 97165 OT EVAL LOW COMPLEX 30 MIN: CPT

## 2023-01-23 PROCEDURE — 11000001 HC ACUTE MED/SURG PRIVATE ROOM

## 2023-01-23 PROCEDURE — 99900035 HC TECH TIME PER 15 MIN (STAT)

## 2023-01-23 PROCEDURE — 25000003 PHARM REV CODE 250: Performed by: INTERNAL MEDICINE

## 2023-01-23 PROCEDURE — 94761 N-INVAS EAR/PLS OXIMETRY MLT: CPT

## 2023-01-23 PROCEDURE — 94799 UNLISTED PULMONARY SVC/PX: CPT

## 2023-01-23 RX ORDER — MAGNESIUM SULFATE HEPTAHYDRATE 40 MG/ML
4 INJECTION, SOLUTION INTRAVENOUS ONCE
Status: DISCONTINUED | OUTPATIENT
Start: 2023-01-23 | End: 2023-01-23

## 2023-01-23 RX ORDER — MAGNESIUM SULFATE HEPTAHYDRATE 40 MG/ML
4 INJECTION, SOLUTION INTRAVENOUS ONCE
Status: COMPLETED | OUTPATIENT
Start: 2023-01-23 | End: 2023-01-23

## 2023-01-23 RX ORDER — POTASSIUM CHLORIDE 7.45 MG/ML
10 INJECTION INTRAVENOUS
Status: COMPLETED | OUTPATIENT
Start: 2023-01-23 | End: 2023-01-23

## 2023-01-23 RX ADMIN — PIPERACILLIN AND TAZOBACTAM 4.5 G: 4; .5 INJECTION, POWDER, LYOPHILIZED, FOR SOLUTION INTRAVENOUS; PARENTERAL at 05:01

## 2023-01-23 RX ADMIN — SODIUM CHLORIDE, SODIUM LACTATE, POTASSIUM CHLORIDE, CALCIUM CHLORIDE AND DEXTROSE MONOHYDRATE: 5; 600; 310; 30; 20 INJECTION, SOLUTION INTRAVENOUS at 12:01

## 2023-01-23 RX ADMIN — POTASSIUM CHLORIDE 10 MEQ: 7.46 INJECTION, SOLUTION INTRAVENOUS at 02:01

## 2023-01-23 RX ADMIN — MAGNESIUM SULFATE HEPTAHYDRATE 4 G: 40 INJECTION, SOLUTION INTRAVENOUS at 03:01

## 2023-01-23 RX ADMIN — POTASSIUM CHLORIDE 10 MEQ: 7.46 INJECTION, SOLUTION INTRAVENOUS at 03:01

## 2023-01-23 RX ADMIN — POTASSIUM CHLORIDE 10 MEQ: 7.46 INJECTION, SOLUTION INTRAVENOUS at 05:01

## 2023-01-23 RX ADMIN — HYDROMORPHONE HYDROCHLORIDE 0.5 MG: 2 INJECTION, SOLUTION INTRAMUSCULAR; INTRAVENOUS; SUBCUTANEOUS at 09:01

## 2023-01-23 RX ADMIN — MUPIROCIN 1 G: 20 OINTMENT TOPICAL at 08:01

## 2023-01-23 RX ADMIN — VANCOMYCIN HYDROCHLORIDE 500 MG: 500 INJECTION, POWDER, LYOPHILIZED, FOR SOLUTION INTRAVENOUS at 10:01

## 2023-01-23 RX ADMIN — FAMOTIDINE 20 MG: 10 INJECTION INTRAVENOUS at 08:01

## 2023-01-23 RX ADMIN — VANCOMYCIN HYDROCHLORIDE 500 MG: 500 INJECTION, POWDER, LYOPHILIZED, FOR SOLUTION INTRAVENOUS at 12:01

## 2023-01-23 RX ADMIN — ENOXAPARIN SODIUM 30 MG: 30 INJECTION SUBCUTANEOUS at 04:01

## 2023-01-23 RX ADMIN — ONDANSETRON 4 MG: 2 INJECTION INTRAMUSCULAR; INTRAVENOUS at 10:01

## 2023-01-23 NOTE — PT/OT/SLP PROGRESS
Physical Therapy Treatment    Patient Name:  Bailey Medina   MRN:  [id number]    Recommendations:     Discharge Recommendations:    Discharge Equipment Recommendations:    Barriers to discharge: None    Assessment:     Bailey Medina is a 89 y.o. female admitted with a medical diagnosis of Colon obstruction.  She presents with the following impairments/functional limitations: weakness, impaired endurance, impaired balance, gait instability, impaired functional mobility, pain.    Pt complaining of nausea and pain but agreeable to try with PT. She required mod-max A for bed mobility. Once up at EOB, she had difficulty tolerating upright sitting due to abdominal pain. She did give a good effort however had increased nausea and pain, pt adamant to return to supine. She did participate in log rolling and scooting up to HOB. Pt limited at this time by pain and nausea.     Rehab Prognosis: Good; patient would benefit from acute skilled PT services to address these deficits and reach maximum level of function.    Recent Surgery: Procedure(s) (LRB):  LAPAROTOMY, EXPLORATORY (N/A)  CREATION, COLOSTOMY (N/A)  CLOSURE, FISTULA, COLOVESICAL (N/A)  COLECTOMY, SIGMOID (N/A)  DRAINAGE, ABSCESS (N/A)  DISSECTION-SPLENIC FLEXURE (N/A)  EXCISION, LESION, UTERUS (N/A) 3 Days Post-Op    Plan:     During this hospitalization, patient to be seen daily to address the identified rehab impairments via gait training, therapeutic activities, therapeutic exercises and progress toward the following goals:    Plan of Care Expires:  02/21/23    Subjective     Chief Complaint: pain and nausea  Patient/Family Comments/goals: to feel better and get stronger  Pain/Comfort:         Objective:     Communicated with patient prior to session.  Patient found HOB elevated with   upon PT entry to room.     General Precautions: Standard, fall  Orthopedic Precautions:    Braces:    Respiratory Status: Room air     Functional Mobility:  Bed Mobility:      Rolling Left:  maximal assistance  Rolling Right: maximal assistance  Scooting: moderate assistance and maximal assistance  Supine to Sit: moderate assistance and maximal assistance  Sit to Supine: maximal assistance  Balance: mod A sitting      AM-PAC 6 CLICK MOBILITY          Treatment & Education:  See above    Patient left HOB elevated with all lines intact and call button in reach..    GOALS:   Multidisciplinary Problems       Physical Therapy Goals          Problem: Physical Therapy    Goal Priority Disciplines Outcome Goal Variances Interventions   Physical Therapy Goal     PT, PT/OT Ongoing, Progressing     Description: Goals to be met by: 23.     Patient will increase functional independence with mobility by performin. Supine to sit with MInimal Assistance  2. Bed to chair transfer with Minimal Assistance using stand and pivot transfer.                         Time Tracking:     PT Received On: 23  PT Start Time: 1400     PT Stop Time: 1420  PT Total Time (min): 20 min     Billable Minutes: Therapeutic Activity 20    Treatment Type: Treatment  PT/PTA: PTA           2023

## 2023-01-23 NOTE — PROGRESS NOTES
Has some nausea today  She has not walked once postop  She has not been out of bed postop    Vs ok, has some lower blood pressure to 99 systolic  A+ox3, nad  Eomi  Abd soft, inc c/d/I, stoma with flatus and stool output, her abdomen is much less distended and softer.   Bue no edema  Skin no rashes  Psych calm, cooperative  Neuro no lateralizing defects appreciated grossly  Campbell in place, urine clear with minimal sediment    Labs -   WBC 11.3, HGB 11, Plt 224  Na 134, K 3.7  BUN/Cr 37/1.8  Mag 1.5. Phos 2.7    AXR images and report personally reviewed and seems consistent with ileus.    89F with obstruction of sigmoid colon secondary to diverticular stricture based with associated abscess and colovesical fistula - she is better.  She appears to have some element of ileus, although she is passing flatus and stool from stoma. Her nausea and axr support the ileus-type picture.  Blood cultures noted and discussed with Dr. De Oliveira - we think the results are from contamination.  Vancomycin has been dc'd for now.  Zosyn remains.  Really needs to get oob and walk.      Plan for:  1) OOB and walk  2) Incentive spirometry  3) Cont zosyn  4) NGT for now  5) Cont campbell catheter - do not remove for the next 2-3 weeks.

## 2023-01-23 NOTE — PT/OT/SLP EVAL
Occupational Therapy   Evaluation    Name: Bailey Medina  MRN: [id number]  Admitting Diagnosis: Colon obstruction  Recent Surgery: Procedure(s) (LRB):  LAPAROTOMY, EXPLORATORY (N/A)  CREATION, COLOSTOMY (N/A)  CLOSURE, FISTULA, COLOVESICAL (N/A)  COLECTOMY, SIGMOID (N/A)  DRAINAGE, ABSCESS (N/A)  DISSECTION-SPLENIC FLEXURE (N/A)  EXCISION, LESION, UTERUS (N/A) 3 Days Post-Op    Recommendations:     Discharge Recommendations: home health OT, home with home health, nursing facility, skilled  Discharge Equipment Recommendations:  walker, rolling, wheelchair, shower chair  Barriers to discharge:  Decreased caregiver support    Assessment:     Bailey Medina is a 89 y.o. female with a medical diagnosis of Colon obstruction.  She presents with performance deficits affecting function: weakness, impaired endurance, impaired self care skills, impaired functional mobility, impaired balance, pain.    Pt with nausea earlier today, however pt and nurse both reports less nauseous recently. Pt attempting to sit EOB, however nausea and abdominal pain to great to allow full EOB sitting. Pt noted with AROM tot WFL in RUE while in semi-supine position and VERY limited AROM in KE to 25% WFL ARC of motion with increased pain with all movement with high suspect of previous RTC injury of some form. Pt plans to d/c home with family at this time.    Rehab Prognosis: Good and Fair; patient would benefit from acute skilled OT services to address these deficits and reach maximum level of function.       Plan:     Patient to be seen 3 x/week to address the above listed problems via self-care/home management, therapeutic activities, therapeutic exercises  Plan of Care Expires:    Plan of Care Reviewed with: patient    Subjective     Chief Complaint: weakness and abdominal pain  Patient/Family Comments/goals: to get stronger to go home safely with family    Occupational Profile:  Living Environment: lives with son and 24 hour  sitters  Previous level of function: min/modA with Adl's and ambulates with RW MI.       Pain/Comfort:  Pain Rating 1: 9/10  Location 1: abdomen  Pain Addressed 1: Reposition  Pain Rating Post-Intervention 1: 5/10    Patients cultural, spiritual, Jainism conflicts given the current situation:      Objective:     Communicated with: nursing prior to session.  Patient found HOB elevated with   upon OT entry to room.    General Precautions: Standard,    Orthopedic Precautions:    Braces:    Respiratory Status: Room air    Occupational Performance:    Bed Mobility:    Patient completed Rolling/Turning to Left with  minimum assistance and moderate assistance  Patient completed Rolling/Turning to Right with minimum assistance and moderate assistance  Patient completed Supine to Sit with maximal assistance  Patient completed Sit to Supine with maximal assistance  ** maxA with all bed mobility due to abdominal pain with all tasks.       Physical Exam:  Dominant hand:    -       R dominate  Upper Extremity Strength:    -       Right Upper Extremity: Deficits: 3-/5, weak from recent sx and illness  -       Left Upper Extremity: Deficits: 2-/5; limited due to high suspect of RTC injury in the past    [facility] 6 Click ADL:  [facility] Total Score: 15    Treatment & Education:  See above    Patient left HOB elevated with all lines intact and call button in reach    GOALS:   Multidisciplinary Problems       Occupational Therapy Goals          Problem: Occupational Therapy    Goal Priority Disciplines Outcome Interventions   Occupational Therapy Goal     OT, PT/OT Ongoing, Progressing    Description: Goals to be met by: 2/21/23     Patient will increase functional independence with ADLs by performing:    UE Dressing with Minimal Assistance.  Sitting at edge of bed x15 minutes with Stand-by Assistance.  Increased functional strength to 4/5 for increased (I) with ADLs.                         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     COLECTOMY, SIGMOID N/A 1/20/2023    Procedure: COLECTOMY, SIGMOID;  Surgeon: LUANNE Cantor MD;  Location: [facility];  Service: General;  Laterality: N/A;  1. Very distended colon with extremely distended cecum with serosal tear that   was repaired at the end of the case.  All colon viable.  2. Sigmoid colon obstruction related to diverticular stricture.  Abscess versus   donte diverticulum-drained.  3. Colovesical fistula at t    COLOSTOMY N/A 1/20/2023    Procedure: CREATION, COLOSTOMY;  Surgeon: LUANNE Cantor MD;  Location: [facility];  Service: General;  Laterality: N/A;    DISSECTION-SPLENIC FLEXURE N/A 1/20/2023    Procedure: DISSECTION-SPLENIC FLEXURE;  Surgeon: LUANNE Cantor MD;  Location: [facility];  Service: General;  Laterality: N/A;  take down splenic flexure    LAPAROTOMY, EXPLORATORY N/A 1/20/2023    Procedure: LAPAROTOMY, EXPLORATORY;  Surgeon: LUANNE Cantor MD;  Location: [facility];  Service: General;  Laterality: N/A;  OPERATION:    1. Exploratory laparotomy.    2. Sigmoid colon resection.    3. Drainage of abscess.   4. Repair of fistula to the bladder.    5. Excision leiomyoma of the uterus.  6. Takedown splenic flexure.       SURGICAL REMOVAL OF LESION OF UTERUS N/A 1/20/2023    Procedure: EXCISION, LESION, UTERUS;  Surgeon: LUANNE Cantor MD;  Location: [facility];  Service: General;  Laterality: N/A;  Pedunculated leiomyoma of the uterus-excised.        Time Tracking:     OT Date of Treatment: 01/23/23  OT Start Time: 1300  OT Stop Time: 1330  OT Total Time (min): 30 min    Billable Minutes:Evaluation 30 1/23/2023

## 2023-01-24 LAB
ALBUMIN SERPL-MCNC: 2.1 G/DL (ref 3.4–4.8)
ALBUMIN/GLOB SERPL: 0.5 RATIO (ref 1.1–2)
ALP SERPL-CCNC: 93 UNIT/L (ref 40–150)
ALT SERPL-CCNC: 5 UNIT/L (ref 0–55)
AST SERPL-CCNC: 28 UNIT/L (ref 5–34)
BACTERIA SPEC ANAEROBE CULT: NORMAL
BACTERIA SPEC CULT: ABNORMAL
BACTERIA SPEC CULT: NO GROWTH
BASOPHILS # BLD AUTO: 0.04 X10(3)/MCL (ref 0–0.2)
BASOPHILS NFR BLD AUTO: 0.4 %
BILIRUBIN DIRECT+TOT PNL SERPL-MCNC: 0.6 MG/DL
BUN SERPL-MCNC: 30 MG/DL (ref 9.8–20.1)
CALCIUM SERPL-MCNC: 8.6 MG/DL (ref 8.4–10.2)
CHLORIDE SERPL-SCNC: 103 MMOL/L (ref 98–111)
CO2 SERPL-SCNC: 24 MMOL/L (ref 23–31)
CREAT SERPL-MCNC: 1.29 MG/DL (ref 0.55–1.02)
EOSINOPHIL # BLD AUTO: 0.22 X10(3)/MCL (ref 0–0.9)
EOSINOPHIL NFR BLD AUTO: 2.1 %
ERYTHROCYTE [DISTWIDTH] IN BLOOD BY AUTOMATED COUNT: 13.8 % (ref 11.5–17)
GFR SERPLBLD CREATININE-BSD FMLA CKD-EPI: 39 MLS/MIN/1.73/M2
GLOBULIN SER-MCNC: 3.9 GM/DL (ref 2.4–3.5)
GLUCOSE SERPL-MCNC: 123 MG/DL (ref 75–121)
HCT VFR BLD AUTO: 34.6 % (ref 37–47)
HGB BLD-MCNC: 11.3 GM/DL (ref 12–16)
IMM GRANULOCYTES # BLD AUTO: 0.06 X10(3)/MCL (ref 0–0.04)
IMM GRANULOCYTES NFR BLD AUTO: 0.6 %
LYMPHOCYTES # BLD AUTO: 0.92 X10(3)/MCL (ref 0.6–4.6)
LYMPHOCYTES NFR BLD AUTO: 8.6 %
MCH RBC QN AUTO: 28.5 PG
MCHC RBC AUTO-ENTMCNC: 32.7 MG/DL (ref 33–36)
MCV RBC AUTO: 87.2 FL (ref 80–94)
MONOCYTES # BLD AUTO: 0.74 X10(3)/MCL (ref 0.1–1.3)
MONOCYTES NFR BLD AUTO: 6.9 %
NEUTROPHILS # BLD AUTO: 8.7 X10(3)/MCL (ref 2.1–9.2)
NEUTROPHILS NFR BLD AUTO: 81.4 %
PLATELET # BLD AUTO: 242 X10(3)/MCL (ref 130–400)
PMV BLD AUTO: 9.5 FL (ref 7.4–10.4)
POTASSIUM SERPL-SCNC: 4.1 MMOL/L (ref 3.5–5.1)
PROT SERPL-MCNC: 6 GM/DL (ref 5.8–7.6)
PSYCHE PATHOLOGY RESULT: NORMAL
RBC # BLD AUTO: 3.97 X10(6)/MCL (ref 4.2–5.4)
SODIUM SERPL-SCNC: 138 MMOL/L (ref 132–146)
WBC # SPEC AUTO: 10.7 X10(3)/MCL (ref 4.5–11.5)

## 2023-01-24 PROCEDURE — 25000003 PHARM REV CODE 250: Performed by: SURGERY

## 2023-01-24 PROCEDURE — 94799 UNLISTED PULMONARY SVC/PX: CPT

## 2023-01-24 PROCEDURE — 85025 COMPLETE CBC W/AUTO DIFF WBC: CPT | Performed by: SURGERY

## 2023-01-24 PROCEDURE — 63600175 PHARM REV CODE 636 W HCPCS: Performed by: ANESTHESIOLOGY

## 2023-01-24 PROCEDURE — 36415 COLL VENOUS BLD VENIPUNCTURE: CPT | Performed by: SURGERY

## 2023-01-24 PROCEDURE — 97530 THERAPEUTIC ACTIVITIES: CPT

## 2023-01-24 PROCEDURE — 99900035 HC TECH TIME PER 15 MIN (STAT)

## 2023-01-24 PROCEDURE — 63600175 PHARM REV CODE 636 W HCPCS: Performed by: SURGERY

## 2023-01-24 PROCEDURE — 94761 N-INVAS EAR/PLS OXIMETRY MLT: CPT

## 2023-01-24 PROCEDURE — 80053 COMPREHEN METABOLIC PANEL: CPT | Performed by: SURGERY

## 2023-01-24 PROCEDURE — 63600175 PHARM REV CODE 636 W HCPCS: Performed by: INTERNAL MEDICINE

## 2023-01-24 PROCEDURE — 11000001 HC ACUTE MED/SURG PRIVATE ROOM

## 2023-01-24 PROCEDURE — 97110 THERAPEUTIC EXERCISES: CPT

## 2023-01-24 RX ORDER — MAGNESIUM SULFATE HEPTAHYDRATE 40 MG/ML
2 INJECTION, SOLUTION INTRAVENOUS ONCE
Status: COMPLETED | OUTPATIENT
Start: 2023-01-24 | End: 2023-01-24

## 2023-01-24 RX ORDER — MAGNESIUM SULFATE HEPTAHYDRATE 40 MG/ML
2 INJECTION, SOLUTION INTRAVENOUS ONCE
Status: DISCONTINUED | OUTPATIENT
Start: 2023-01-24 | End: 2023-01-24

## 2023-01-24 RX ADMIN — PIPERACILLIN AND TAZOBACTAM 4.5 G: 4; .5 INJECTION, POWDER, LYOPHILIZED, FOR SOLUTION INTRAVENOUS; PARENTERAL at 05:01

## 2023-01-24 RX ADMIN — MAGNESIUM SULFATE HEPTAHYDRATE 2 G: 2 INJECTION, SOLUTION INTRAVENOUS at 01:01

## 2023-01-24 RX ADMIN — FAMOTIDINE 20 MG: 10 INJECTION INTRAVENOUS at 09:01

## 2023-01-24 RX ADMIN — ENOXAPARIN SODIUM 30 MG: 30 INJECTION SUBCUTANEOUS at 05:01

## 2023-01-24 RX ADMIN — MUPIROCIN 1 G: 20 OINTMENT TOPICAL at 09:01

## 2023-01-24 RX ADMIN — ONDANSETRON 4 MG: 2 INJECTION INTRAMUSCULAR; INTRAVENOUS at 05:01

## 2023-01-24 RX ADMIN — HYDROMORPHONE HYDROCHLORIDE 0.5 MG: 2 INJECTION, SOLUTION INTRAMUSCULAR; INTRAVENOUS; SUBCUTANEOUS at 05:01

## 2023-01-24 NOTE — PROGRESS NOTES
Patient feeling better  Does not remember if she got up to walk or not yesterday  Feels much better with NGT  Abdomen feels better  Knees hurt her    Vs ok  A+ox3, nad  Eomi  Abd soft, stoma viable and productive of stool, no air today.  Inc c/d/I  Spencer serosanguinous output   Blum in place with clear urine  Stoma output not recorded  Urine output yesterday 1325 with drain output yesterday 600 cc    Labs -   Pending    89F s/p sigmoid colon resection for obstructing stricture with abscess and colovesical fistula - she is better, but she really needs to get oob.  Ileus present.  Plan for:  1) Check labs  2) OOB and walk  3) Cont ngt till better bowel function and nausea completely gone  4) Will consider TPN if can not remove NGT in coming 1-2 days

## 2023-01-24 NOTE — PROGRESS NOTES
Inpatient Nutrition Assessment    Admit Date: 1/20/2023   Total duration of encounter: 4 days     Nutrition Recommendation/Prescription     When medically appropriate, advance diet as tolerated to Low fiber / residue / GI soft.  If unable to progress diet in 1-2 days, recommend start parenteral nutrition.  Pt does not have central line.  Would need to start PPN.  Recommend Clinimix 4.25 /5 E @ 70 ml/hr to provide 571 kcal/d, 71 g amino acids/d, 1680 ml fluid/d.  Order intralipids 20% 250 ml daily for additional 500 kcals.  This would meet ~ 77% estimated kcal needs, and 100% estimated protein needs.  If central line placed, consult for TPN recs.  Weekly wts  Monitor daily BMP, Mg and Phos and replace lytes as needed.    Communication of Recommendations: reviewed with patient/caregiver    Nutrition Assessment     Malnutrition Assessment/Nutrition-Focused Physical Exam    Malnutrition in the context of acute illness or injury  Degree of Malnutrition: does not meet criteria  Energy Intake: does not meet criteria  Interpretation of Weight Loss: does not meet criteria  Body Fat:does not meet criteria  Area of Body Fat Loss: does not meet criteria  Muscle Mass Loss: does not meet criteria  Area of Muscle Mass Loss: does not meet criteria  Fluid Accumulation: does not meet criteria  Edema: does not meet criteria   Reduced  Strength: unable to obtain  A minimum of two characteristics is recommended for diagnosis of either severe or non-severe malnutrition.    Chart Review    Reason Seen: follow-up    Malnutrition Screening Tool Results   Have you recently lost weight without trying?: No  Have you been eating poorly because of a decreased appetite?: No   MST Score: 0     Diagnosis:  Obstruction of sigmoid colon, UTI, fecal incontinence.  S/P ex lap, sigmoid colon resection with diverting colostomy and repair of entero vesicle bladder fistula with drainage of intra-abdominal abscess. on 1/20/23    Relevant Medical  "History:   Past Medical History:   Diagnosis Date    Diverticulosis     Hypertension          Nutrition-Related Medications: IVF:  D5% at 125 ml/hr; Mag sulfate IVPB  Calorie Containing IV Medications: no significant kcals from medications at this time    Nutrition-Related Labs:  :  H/H 11/33.3 L, BUN 37 H, Creat 1.81 H, Glu 222 H, Mg 1.5 L, Alb 2.5 L  :  H/H 11/33.8 L, Na 130 L, BUN 39 H, Creat 1.53 H, Alb 2.9 L,     Diet/PN Order: Diet NPO Except for: Other (Comment)  Oral Supplement Order: none  Tube Feeding Order: none  Appetite/Oral Intake: NPO/NPO  Factors Affecting Nutritional Intake: altered gastrointestinal function, nausea, and NPO  Food/Rastafarian/Cultural Preferences: none reported  Food Allergies: no known food allergies    Skin Integrity: incision, wound  Wound(s):   none noted    Comments  :  Pt remains NPO (x 4 days) with NGT to suction post op.  No new wts available.  New labs / meds reviewed - decreased Mg is being replaced.    :  Pt asleep when visited. NGT to suction.  Spoke with patient's son at bedside.  Reports patient with usual good appetite / intake until N/V started ~ 2 days ago.  States family thought she was constipated and gave pt miralax and prune juice.  Denies wt loss.  Labs / meds reviewed.      Anthropometrics    Height: 5' 1" (154.9 cm)    Last Weight: 56.7 kg (125 lb) (23 0408)    BMI (Calculated): 23.6  BMI Classification: normal (BMI 18.5-24.9)     Ideal Body Weight (IBW), Female: 105 lb     % Ideal Body Weight, Female (lb): 119.05 %                    Usual Body Weight (UBW), k.7 kg  % Usual Body Weight: 100.21     Usual Weight Provided By: family/caregiver    Wt Readings from Last 5 Encounters:   23 56.7 kg (125 lb)     Weight Change(s) Since Admission:  Admit Weight: 56.7 kg (125 lb) (23 0408)  :  56.7 kg  :  no new wts    Estimated Needs    Weight Used For Calorie Calculations: 56.7 kg (125 lb)  Energy Calorie Requirements " (kcal): 5684-0989 kcal/d (25-30 kcal/kg)     Weight Used For Protein Calculations: 56.7 kg (125 lb)  Protein Requirements: 56 - 68 g Pro/d (1 - 1.2 g/kg)  Fluid Requirements (mL): 4203-8189 ml/d (25-30 ml/kg)  Temp: 97.8 °F (36.6 °C)       Enteral Nutrition    Patient not receiving enteral nutrition at this time.    Parenteral Nutrition    Patient not receiving parenteral nutrition support at this time.    Evaluation of Received Nutrient Intake    Calories: not meeting estimated needs  Protein: not meeting estimated needs    Patient Education    Not applicable.    Nutrition Diagnosis     PES: Inadequate oral intake related to bowel obstruction s/p colon resection as evidenced by nausea / vomiting, NPO status x 4 days. (continues)    Interventions/Goals     Intervention(s): general/healthful diet and collaboration with other providers  Goal: Meet greater than 75% of nutritional needs by follow-up. (goal not met)    Monitoring & Evaluation     Dietitian will monitor energy intake and weight.  Nutrition Risk/Follow-Up: moderate (follow-up in 3-5 days)   Please consult if re-assessment needed sooner.

## 2023-01-24 NOTE — PT/OT/SLP PROGRESS
Physical Therapy Treatment    Patient Name:  Bailey Medina   MRN:  [id number]    Recommendations:     Discharge Recommendations:    Discharge Equipment Recommendations:    Barriers to discharge: None    Assessment:     Bailey Medina is a 89 y.o. female admitted with a medical diagnosis of Colon obstruction.  She presents with the following impairments/functional limitations: weakness, impaired endurance, impaired balance, gait instability, impaired functional mobility, pain.    Pt feeling better today, nauseated but wanting to get OOB. She required mod-max A for bed mobility. Pt able to stand and ambulate about 8' with RW and mod A. Much improved tolerance, decreased pain.     Rehab Prognosis: Good; patient would benefit from acute skilled PT services to address these deficits and reach maximum level of function.    Recent Surgery: Procedure(s) (LRB):  LAPAROTOMY, EXPLORATORY (N/A)  CREATION, COLOSTOMY (N/A)  CLOSURE, FISTULA, COLOVESICAL (N/A)  COLECTOMY, SIGMOID (N/A)  DRAINAGE, ABSCESS (N/A)  DISSECTION-SPLENIC FLEXURE (N/A)  EXCISION, LESION, UTERUS (N/A) 4 Days Post-Op    Plan:     During this hospitalization, patient to be seen daily to address the identified rehab impairments via gait training, therapeutic activities, therapeutic exercises and progress toward the following goals:    Plan of Care Expires:  02/21/23    Subjective     Chief Complaint: pain and nausea  Patient/Family Comments/goals: to feel better and get stronger  Pain/Comfort:         Objective:     Communicated with patient prior to session.  Patient found HOB elevated with   upon PT entry to room.     General Precautions: Standard, fall  Orthopedic Precautions:    Braces:    Respiratory Status: Room air     Functional Mobility:  Bed Mobility:     Supine to Sit: moderate assistance and maximal assistance  Transfers:     Sit to Stand:  moderate assistance with rolling walker  Gait: 8' with RW and min-mod A  Balance: mod A  sitting      AM-PAC 6 CLICK MOBILITY          Treatment & Education:  See above    Patient left up in chair with all lines intact and call button in reach..    GOALS:   Multidisciplinary Problems       Physical Therapy Goals          Problem: Physical Therapy    Goal Priority Disciplines Outcome Goal Variances Interventions   Physical Therapy Goal     PT, PT/OT Ongoing, Progressing     Description: Goals to be met by: 23.     Patient will increase functional independence with mobility by performin. Supine to sit with MInimal Assistance  2. Bed to chair transfer with Minimal Assistance using stand and pivot transfer.                         Time Tracking:     PT Received On: 23  PT Start Time: 1000     PT Stop Time: 1020  PT Total Time (min): 20 min     Billable Minutes: Therapeutic Activity 20    Treatment Type: Treatment  PT/PTA: PTA           2023

## 2023-01-24 NOTE — PT/OT/SLP PROGRESS
Occupational Therapy   Treatment    Name: Bailey Medina  MRN: [id number]  Admitting Diagnosis:  Colon obstruction  4 Days Post-Op    Recommendations:     Discharge Recommendations: home health OT, home with home health, nursing facility, skilled  Discharge Equipment Recommendations:  walker, rolling, wheelchair, shower chair  Barriers to discharge:  Decreased caregiver support    Assessment:     Bailey Medina is a 89 y.o. female with a medical diagnosis of Colon obstruction.  She presents with performance deficits affecting function are weakness, impaired endurance, impaired self care skills, impaired balance, impaired cognition.   Pt with fewer c/o nausea today. Pt with maxA to transition supine to sit and jane sitting EOB X10 min with Mckenna. Pt able to rise to stand with modA X2. Pt began to have a BM and was returned back to bed and cleaned up. Pt with maxA to roll R/L and total A for personal; hygiene.     Rehab Prognosis:  Good; patient would benefit from acute skilled OT services to address these deficits and reach maximum level of function.       Plan:     Patient to be seen 3 x/week to address the above listed problems via self-care/home management, therapeutic activities, therapeutic exercises  Plan of Care Expires:    Plan of Care Reviewed with: patient    Subjective     Pain/Comfort:       Objective:     Communicated with: nursing prior to session.  Patient found HOB elevated with   upon OT entry to room.    General Precautions: Standard,      Orthopedic Precautions:   Braces:    Respiratory Status: Room air     Occupational Performance:     Bed Mobility:    Patient completed Rolling/Turning to Left with  moderate assistance and maximal assistance  Patient completed Rolling/Turning to Right with moderate assistance and maximal assistance  Patient completed Supine to Sit with maximal assistance  Patient completed Sit to Supine with maximal assistance     Functional Mobility/Transfers:  Patient  completed Sit <> Stand Transfer with moderate assistance and of 2 persons  with  rolling walker       Activities of Daily Living:  Toileting: maximal assistance and total assistance        AMPA 6 Click ADL:      Treatment & Education:  See above    Patient left HOB elevated with all lines intact, call button in reach, and sitter present    GOALS:   Multidisciplinary Problems       Occupational Therapy Goals          Problem: Occupational Therapy    Goal Priority Disciplines Outcome Interventions   Occupational Therapy Goal     OT, PT/OT Ongoing, Progressing    Description: Goals to be met by: 2/21/23     Patient will increase functional independence with ADLs by performing:    UE Dressing with Minimal Assistance.  Sitting at edge of bed x15 minutes with Stand-by Assistance.  Increased functional strength to 4/5 for increased (I) with ADLs.                         Time Tracking:     OT Date of Treatment: 01/24/23  OT Start Time: 0756  OT Stop Time: 0811  OT Total Time (min): 15 min    Billable Minutes:Therapeutic Exercise 15    OT/EUGENE: OT          1/24/2023

## 2023-01-25 LAB
ALBUMIN SERPL-MCNC: 2.2 G/DL (ref 3.4–4.8)
ALBUMIN/GLOB SERPL: 0.7 RATIO (ref 1.1–2)
ALP SERPL-CCNC: 106 UNIT/L (ref 40–150)
ALT SERPL-CCNC: 5 UNIT/L (ref 0–55)
AST SERPL-CCNC: 18 UNIT/L (ref 5–34)
BASOPHILS # BLD AUTO: 0.05 X10(3)/MCL (ref 0–0.2)
BASOPHILS NFR BLD AUTO: 0.6 %
BILIRUBIN DIRECT+TOT PNL SERPL-MCNC: 0.6 MG/DL
BUN SERPL-MCNC: 27 MG/DL (ref 9.8–20.1)
CALCIUM SERPL-MCNC: 8.3 MG/DL (ref 8.4–10.2)
CHLORIDE SERPL-SCNC: 104 MMOL/L (ref 98–111)
CO2 SERPL-SCNC: 25 MMOL/L (ref 23–31)
CREAT SERPL-MCNC: 1.07 MG/DL (ref 0.55–1.02)
EOSINOPHIL # BLD AUTO: 0.24 X10(3)/MCL (ref 0–0.9)
EOSINOPHIL NFR BLD AUTO: 3.1 %
ERYTHROCYTE [DISTWIDTH] IN BLOOD BY AUTOMATED COUNT: 13.8 % (ref 11.5–17)
GFR SERPLBLD CREATININE-BSD FMLA CKD-EPI: 49 MLS/MIN/1.73/M2
GLOBULIN SER-MCNC: 3.3 GM/DL (ref 2.4–3.5)
GLUCOSE SERPL-MCNC: 109 MG/DL (ref 75–121)
HCT VFR BLD AUTO: 35.6 % (ref 37–47)
HGB BLD-MCNC: 11.5 GM/DL (ref 12–16)
IMM GRANULOCYTES # BLD AUTO: 0.05 X10(3)/MCL (ref 0–0.04)
IMM GRANULOCYTES NFR BLD AUTO: 0.6 %
LYMPHOCYTES # BLD AUTO: 0.93 X10(3)/MCL (ref 0.6–4.6)
LYMPHOCYTES NFR BLD AUTO: 12 %
MAGNESIUM SERPL-MCNC: 2.2 MG/DL (ref 1.6–2.6)
MCH RBC QN AUTO: 28 PG
MCHC RBC AUTO-ENTMCNC: 32.3 MG/DL (ref 33–36)
MCV RBC AUTO: 86.8 FL (ref 80–94)
MONOCYTES # BLD AUTO: 0.75 X10(3)/MCL (ref 0.1–1.3)
MONOCYTES NFR BLD AUTO: 9.7 %
NEUTROPHILS # BLD AUTO: 5.71 X10(3)/MCL (ref 2.1–9.2)
NEUTROPHILS NFR BLD AUTO: 74 %
PHOSPHATE SERPL-MCNC: 2.3 MG/DL (ref 2.3–4.7)
PLATELET # BLD AUTO: 268 X10(3)/MCL (ref 130–400)
PMV BLD AUTO: 10 FL (ref 7.4–10.4)
POTASSIUM SERPL-SCNC: 3.4 MMOL/L (ref 3.5–5.1)
PROT SERPL-MCNC: 5.5 GM/DL (ref 5.8–7.6)
RBC # BLD AUTO: 4.1 X10(6)/MCL (ref 4.2–5.4)
SODIUM SERPL-SCNC: 138 MMOL/L (ref 132–146)
WBC # SPEC AUTO: 7.7 X10(3)/MCL (ref 4.5–11.5)

## 2023-01-25 PROCEDURE — 11000001 HC ACUTE MED/SURG PRIVATE ROOM

## 2023-01-25 PROCEDURE — 97530 THERAPEUTIC ACTIVITIES: CPT

## 2023-01-25 PROCEDURE — 25000003 PHARM REV CODE 250: Performed by: SURGERY

## 2023-01-25 PROCEDURE — C1751 CATH, INF, PER/CENT/MIDLINE: HCPCS

## 2023-01-25 PROCEDURE — 83735 ASSAY OF MAGNESIUM: CPT | Performed by: SURGERY

## 2023-01-25 PROCEDURE — 63600175 PHARM REV CODE 636 W HCPCS: Performed by: SURGERY

## 2023-01-25 PROCEDURE — 63600175 PHARM REV CODE 636 W HCPCS: Performed by: INTERNAL MEDICINE

## 2023-01-25 PROCEDURE — 36569 INSJ PICC 5 YR+ W/O IMAGING: CPT

## 2023-01-25 PROCEDURE — 85025 COMPLETE CBC W/AUTO DIFF WBC: CPT | Performed by: SURGERY

## 2023-01-25 PROCEDURE — 36415 COLL VENOUS BLD VENIPUNCTURE: CPT | Performed by: SURGERY

## 2023-01-25 PROCEDURE — 84100 ASSAY OF PHOSPHORUS: CPT | Performed by: SURGERY

## 2023-01-25 PROCEDURE — 94799 UNLISTED PULMONARY SVC/PX: CPT

## 2023-01-25 PROCEDURE — 80053 COMPREHEN METABOLIC PANEL: CPT | Performed by: SURGERY

## 2023-01-25 PROCEDURE — C9113 INJ PANTOPRAZOLE SODIUM, VIA: HCPCS | Performed by: SURGERY

## 2023-01-25 PROCEDURE — 94761 N-INVAS EAR/PLS OXIMETRY MLT: CPT

## 2023-01-25 PROCEDURE — 99900035 HC TECH TIME PER 15 MIN (STAT)

## 2023-01-25 PROCEDURE — A4216 STERILE WATER/SALINE, 10 ML: HCPCS | Performed by: SURGERY

## 2023-01-25 RX ORDER — SODIUM CHLORIDE 0.9 % (FLUSH) 0.9 %
10 SYRINGE (ML) INJECTION
Status: DISCONTINUED | OUTPATIENT
Start: 2023-01-25 | End: 2023-01-29

## 2023-01-25 RX ORDER — ONDANSETRON HYDROCHLORIDE 4 MG/5ML
4 SOLUTION ORAL ONCE
Status: DISCONTINUED | OUTPATIENT
Start: 2023-01-25 | End: 2023-01-25

## 2023-01-25 RX ORDER — ONDANSETRON 2 MG/ML
8 INJECTION INTRAMUSCULAR; INTRAVENOUS EVERY 6 HOURS PRN
Status: DISCONTINUED | OUTPATIENT
Start: 2023-01-25 | End: 2023-01-30 | Stop reason: HOSPADM

## 2023-01-25 RX ORDER — ONDANSETRON 4 MG/1
4 TABLET, ORALLY DISINTEGRATING ORAL ONCE
Status: DISCONTINUED | OUTPATIENT
Start: 2023-01-25 | End: 2023-01-30 | Stop reason: HOSPADM

## 2023-01-25 RX ORDER — SODIUM CHLORIDE 0.9 % (FLUSH) 0.9 %
10 SYRINGE (ML) INJECTION EVERY 6 HOURS
Status: DISCONTINUED | OUTPATIENT
Start: 2023-01-25 | End: 2023-01-29

## 2023-01-25 RX ADMIN — PIPERACILLIN AND TAZOBACTAM 4.5 G: 4; .5 INJECTION, POWDER, LYOPHILIZED, FOR SOLUTION INTRAVENOUS; PARENTERAL at 05:01

## 2023-01-25 RX ADMIN — MUPIROCIN 1 G: 20 OINTMENT TOPICAL at 10:01

## 2023-01-25 RX ADMIN — PIPERACILLIN AND TAZOBACTAM 4.5 G: 4; .5 INJECTION, POWDER, LYOPHILIZED, FOR SOLUTION INTRAVENOUS; PARENTERAL at 06:01

## 2023-01-25 RX ADMIN — FAMOTIDINE 20 MG: 10 INJECTION INTRAVENOUS at 08:01

## 2023-01-25 RX ADMIN — ONDANSETRON 8 MG: 2 INJECTION INTRAMUSCULAR; INTRAVENOUS at 10:01

## 2023-01-25 RX ADMIN — ENOXAPARIN SODIUM 30 MG: 30 INJECTION SUBCUTANEOUS at 04:01

## 2023-01-25 RX ADMIN — ONDANSETRON 4 MG: 2 INJECTION INTRAMUSCULAR; INTRAVENOUS at 08:01

## 2023-01-25 RX ADMIN — PANTOPRAZOLE SODIUM 8 MG/HR: 40 INJECTION, POWDER, FOR SOLUTION INTRAVENOUS at 09:01

## 2023-01-25 RX ADMIN — HYDROMORPHONE HYDROCHLORIDE 0.5 MG: 2 INJECTION, SOLUTION INTRAMUSCULAR; INTRAVENOUS; SUBCUTANEOUS at 10:01

## 2023-01-25 RX ADMIN — SODIUM CHLORIDE, SODIUM LACTATE, POTASSIUM CHLORIDE, CALCIUM CHLORIDE AND DEXTROSE MONOHYDRATE: 5; 600; 310; 30; 20 INJECTION, SOLUTION INTRAVENOUS at 06:01

## 2023-01-25 RX ADMIN — SODIUM CHLORIDE, SODIUM LACTATE, POTASSIUM CHLORIDE, CALCIUM CHLORIDE AND DEXTROSE MONOHYDRATE: 5; 600; 310; 30; 20 INJECTION, SOLUTION INTRAVENOUS at 11:01

## 2023-01-25 RX ADMIN — SODIUM CHLORIDE, PRESERVATIVE FREE 10 ML: 5 INJECTION INTRAVENOUS at 06:01

## 2023-01-25 NOTE — PT/OT/SLP PROGRESS
Physical Therapy Treatment    Patient Name:  Bailey Medina   MRN:  [id number]    Recommendations:     Discharge Recommendations:    Discharge Equipment Recommendations:    Barriers to discharge: None    Assessment:     Bailey Medina is a 89 y.o. female admitted with a medical diagnosis of Colon obstruction.  She presents with the following impairments/functional limitations: weakness, impaired endurance, impaired balance, gait instability, impaired functional mobility, pain.    Pt with improved overall mobility today. Requires extended rest time between tasks. She ambulated 10ft with good tolerance.    Rehab Prognosis: Good; patient would benefit from acute skilled PT services to address these deficits and reach maximum level of function.    Recent Surgery: Procedure(s) (LRB):  LAPAROTOMY, EXPLORATORY (N/A)  CREATION, COLOSTOMY (N/A)  CLOSURE, FISTULA, COLOVESICAL (N/A)  COLECTOMY, SIGMOID (N/A)  DRAINAGE, ABSCESS (N/A)  DISSECTION-SPLENIC FLEXURE (N/A)  EXCISION, LESION, UTERUS (N/A) 5 Days Post-Op    Plan:     During this hospitalization, patient to be seen daily to address the identified rehab impairments via gait training, therapeutic activities, therapeutic exercises and progress toward the following goals:    Plan of Care Expires:  02/21/23    Subjective     Chief Complaint: pain and nausea  Patient/Family Comments/goals: to feel better and get stronger  Pain/Comfort:         Objective:     Communicated with patient prior to session.  Patient found HOB elevated with   upon PT entry to room.     General Precautions: Standard, fall  Orthopedic Precautions:    Braces:    Respiratory Status: Room air     Functional Mobility:  Bed Mobility:     Supine to Sit: moderate assistance and maximal assistance  Transfers:     Sit to Stand:  moderate assistance with rolling walker  Gait: 10' with RW and min-mod A  Balance: mod A sitting      AM-PAC 6 CLICK MOBILITY          Treatment & Education:  See above    Patient  left up in chair with all lines intact and call button in reach..    GOALS:   Multidisciplinary Problems       Physical Therapy Goals          Problem: Physical Therapy    Goal Priority Disciplines Outcome Goal Variances Interventions   Physical Therapy Goal     PT, PT/OT Ongoing, Progressing     Description: Goals to be met by: 23.     Patient will increase functional independence with mobility by performin. Supine to sit with MInimal Assistance  2. Bed to chair transfer with Minimal Assistance using stand and pivot transfer.                         Time Tracking:     PT Received On: 23  PT Start Time: 1000     PT Stop Time: 1020  PT Total Time (min):       Billable Minutes: Therapeutic Activity 2023

## 2023-01-25 NOTE — PT/OT/SLP PROGRESS
Occupational Therapy   Treatment    Name: Bailey Medina  MRN: [id number]  Admitting Diagnosis:  Colon obstruction  5 Days Post-Op    Recommendations:     Discharge Recommendations: home health OT, home with home health, nursing facility, skilled  Discharge Equipment Recommendations:  walker, rolling, wheelchair, shower chair  Barriers to discharge:  Decreased caregiver support    Assessment:     Bailey Medina is a 89 y.o. female with a medical diagnosis of Colon obstruction.  She presents with performance deficits affecting function are weakness, impaired endurance, impaired self care skills, impaired balance, impaired cognition.     Pt with less pain and nausea today. Able to transition supine to sti with less assist form OT, however still mod/maxA. Con't to have difficulty with scooting to EOB. Pt able to rise to stand with modA and t/f EOB to chair with minAX2 and increased fatigue.     Rehab Prognosis:  Good; patient would benefit from acute skilled OT services to address these deficits and reach maximum level of function.       Plan:     Patient to be seen 3 x/week to address the above listed problems via self-care/home management, therapeutic activities, therapeutic exercises  Plan of Care Expires:    Plan of Care Reviewed with: patient    Subjective     Pain/Comfort:       Objective:     Communicated with: nursing prior to session.  Patient found HOB elevated with   upon OT entry to room.    General Precautions: Standard,      Orthopedic Precautions:   Braces:    Respiratory Status: Room air     Occupational Performance:     Bed Mobility:    Patient completed Rolling/Turning to Left with  moderate assistance and maximal assistance  Patient completed Rolling/Turning to Right with moderate assistance and maximal assistance  Patient completed Supine to Sit with moderate assistance and maximal assistance     Functional Mobility/Transfers:  Patient completed Bed <> Chair Transfer using Step Transfer  technique with moderate assistance and maximal assistance with rolling walker        [facility] 6 Click ADL:      Treatment & Education:  See above    Patient left up in chair with all lines intact and call button in reach    GOALS:   Multidisciplinary Problems       Occupational Therapy Goals          Problem: Occupational Therapy    Goal Priority Disciplines Outcome Interventions   Occupational Therapy Goal     OT, PT/OT Ongoing, Progressing    Description: Goals to be met by: 2/21/23     Patient will increase functional independence with ADLs by performing:    UE Dressing with Minimal Assistance.  Sitting at edge of bed x15 minutes with Stand-by Assistance.  Increased functional strength to 4/5 for increased (I) with ADLs.                         Time Tracking:     OT Date of Treatment: 01/25/23  OT Start Time: 1300  OT Stop Time: 1315  OT Total Time (min): 15 min    Billable Minutes:Therapeutic Activity 15    OT/EUGENE: OT          1/25/2023

## 2023-01-25 NOTE — PROGRESS NOTES
"[facility]    Patient Name: Bailey Medina  Age: 89 y.o.   MRN: [id number]  Admission Date: 1/20/2023  4:00 AM   Patient Class: IP- Inpatient  Benefits: Payor: MEDICARE / Plan: MEDICARE PART A & B / Product Type: Government /    Primary Care Provider: Magdaleno Pruett MD   Pharmacy:   [facility] [address]  [address] [address]  Phone: [phone] Fax: [phone]    Code Status: Full Code      Chief Complaint:   Chief Complaint   Patient presents with    Abdominal Pain     Pt transferred from [facility], dx with SBO        Admit Diagnosis:   Dehydration [E86.0]  Small bowel obstruction [K56.609]  Large bowel obstruction [K56.609]  Nausea and vomiting, unspecified vomiting type [R11.2]     HPI:  89yo WF with h/o CAD, PVD, HTN, megadiverticulum initially presented to ED in [place] c/o Abd Pain with Liquid Diarrhea x2 days. She began having N/V as well and decided to seek care. Workup there with CT A/P suggested SBO/Colon Obstruction and Pneumotosis. She was then transferred here to [facility] in [place] for Surgical evaluation. Pt c/o abd pain controlled with current analgesia, thirst and some nausea. Labs reveal dehydration. WBC nml. NGT to suction. Awaiting GenSurg eval.    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  2.Sigmoid colon resection.  3.Drainage of abscess. 4.Repair of fistula to the bladder.  5.Excision leiomyoma of the uterus. 6.Takedown splenic flexure.  Pt feels "OK" today with abd pain from surgery.     1/22/23  - No new complaints today. Still not feeling well with abd pain when she moves around, otherwise comfortable at rest. Hgb " 8.5 from 10.6. She has been somewhat hypOtensive and BUN/Cr slightly increased.  Will transfuse 2u PRBC's which will also give volume expansion and hopefully increased renal perfusion.    1/23/23  - NGT placed. No acute complaints. Cr 1.81 improved from 2.00 yest.    1/24/23  - Feeling better. Cr 1.29 from 1.81.        OBJECTIVE:       Scheduled Meds:   enoxaparin  30 mg Subcutaneous Daily    famotidine (PF)  20 mg Intravenous Daily    mupirocin   Nasal BID    ondansetron  4 mg Oral Once    piperacillin-tazobactam (ZOSYN) IVPB  4.5 g Intravenous Q12H    sodium chloride 0.9%  1,000 mL Intravenous Once    sodium chloride 0.9%  10 mL Intravenous Q6H     Continuous Infusions:   dextrose 5% lactated ringers 125 mL/hr at 01/25/23 1143     PRN Meds:   sodium chloride    diphenhydrAMINE    HYDROmorphone    HYDROmorphone    melatonin    naloxone    ondansetron    oxyCODONE    sodium chloride 0.9%    sodium chloride 0.9%      Lines/Drains:   Lines/Drains/Airways       Peripherally Inserted Central Catheter Line  Duration             PICC Double Lumen 01/25/23 1348 right basilic <1 day              Drain  Duration                  Closed/Suction Drain 01/20/23 RLQ Bulb 5 days         Colostomy 01/20/23 LUQ 5 days         Urethral Catheter 01/20/23 Silicone 5 days         NG/OG Tube 01/23/23 1500 nasogastric 16 Fr. Left nostril 1 day                    Vitals reviewed    Physical Exam  Constitutional:       General: She is awake. She is not in acute distress.     Appearance: She is well-developed. She is not ill-appearing.   HENT:      Mouth/Throat:      Mouth: Mucous membranes are moist.      Pharynx: Oropharynx is clear.   Cardiovascular:      Rate and Rhythm: Normal rate and regular rhythm.   Pulmonary:      Effort: Pulmonary effort is normal.      Breath sounds: Normal breath sounds.   Abdominal:      Palpations: Abdomen is soft.      Tenderness: There is abdominal tenderness.   Skin:     General: Skin is warm and dry.    Neurological:      General: No focal deficit present.      Mental Status: She is alert and oriented to person, place, and time. Mental status is at baseline.   Psychiatric:         Behavior: Behavior is cooperative.        Recent Labs   Lab 01/23/23  0306 01/24/23  1705   WBC 11.3 10.7   HGB 11.0* 11.3*   HCT 33.3* 34.6*    242   MCV 86.9 87.2          Recent Labs   Lab 01/22/23  0438 01/23/23  0306 01/24/23  1705    134 138   K 4.2 3.7 4.1   CHLORIDE 100 100 103   CO2 23 23 24   BUN 38.0* 37.0* 30.0*   CREATININE 2.00* 1.81* 1.29*   GLUCOSE 140* 222* 123*   MG 1.50* 1.50*  --    PHOS 3.1 2.7  --    CALCIUM 8.0* 8.3* 8.6   ALBUMIN 2.7* 2.5* 2.1*   BILITOT 0.5 0.6 0.6   ALKPHOS 60 84 93   AST 21 20 28   ALT 8 5 5             ASSESSMENT/PLAN:       Colon obstruction    TYLER (acute kidney injury)    Dehydration    Postoperative anemia    CAD (coronary artery disease)    PVD (peripheral vascular disease)    Hypertension       - cont current  - GenSurg mgt  - monitor labs  - IVF          VTE Risk Mitigation (From admission, onward)           Ordered     enoxaparin injection 30 mg  Daily         01/20/23 2310     IP VTE HIGH RISK PATIENT  Once         01/20/23 2310     Place sequential compression device  Until discontinued         01/20/23 0633     Place RADHIKA hose  Until discontinued         01/20/23 0633                           Sachin De Oliveira MD  [facility] Medicine   [facility]

## 2023-01-25 NOTE — PROCEDURES
"Bailey Medina is a 89 y.o. female patient.    Temp: 98.2 °F (36.8 °C) (01/25/23 1102)  Pulse: 81 (01/25/23 1102)  Resp: 19 (01/25/23 1102)  BP: (!) 147/81 (01/25/23 1102)  SpO2: (!) 93 % (01/25/23 1102)  Weight: 78 kg (171 lb 15.3 oz) (01/25/23 0529)  Height: 5' 1" (154.9 cm) (01/20/23 0408)    PICC  Date/Time: 1/25/2023 1:46 PM  Performed by: Fabian Reardon RN  Consent Done: Yes  Time out: Immediately prior to procedure a time out was called to verify the correct patient, procedure, equipment, support staff and site/side marked as required  Indications: med administration and vascular access  Anesthesia: local infiltration  Local anesthetic: lidocaine 1% without epinephrine    Preparation: skin prepped with ChloraPrep  Skin prep agent dried: skin prep agent completely dried prior to procedure  Sterile barriers: all five maximum sterile barriers used - cap, mask, sterile gown, sterile gloves, and large sterile sheet  Hand hygiene: hand hygiene performed prior to central venous catheter insertion  Location details: right basilic  Catheter type: double lumen  Catheter size: 5 Fr  Catheter Length: 40cm    Ultrasound guidance: yes  Vessel Caliber: medium and patent, compressibility normal  Needle advanced into vessel with real time Ultrasound guidance.  Guidewire confirmed in vessel.  Sterile sheath used.  Number of attempts: 1  Post-procedure: blood return through all ports, sterile dressing applied and chlorhexidine patch    Assessment: placement verified by x-ray  Complications: none  Comments: Arm circumference 29cm      Fabian Reardon RN  1/25/2023    "

## 2023-01-25 NOTE — PROGRESS NOTES
[facility]    Patient Name: Bailey Medina  Age: 89 y.o.   MRN: [id number]  Admission Date: 1/20/2023  4:00 AM   Patient Class: IP- Inpatient  Benefits: Payor: MEDICARE / Plan: MEDICARE PART A & B / Product Type: Government /    Primary Care Provider: Magdaleno Pruett MD   Pharmacy:   [facility] [address]  [address] [facility] [address]  Phone: [phone] Fax: [phone]    Code Status: Full Code      Chief Complaint:   Chief Complaint   Patient presents with    Abdominal Pain     Pt transferred from [facility], dx with SBO        Admit Diagnosis:   Dehydration [E86.0]  Small bowel obstruction [K56.609]  Large bowel obstruction [K56.609]  Nausea and vomiting, unspecified vomiting type [R11.2]     HPI:  89yo WF with h/o CAD, PVD, HTN, megadiverticulum initially presented to ED in [place] c/o Abd Pain with Liquid Diarrhea x2 days. She began having N/V as well and decided to seek care. Workup there with CT A/P suggested SBO/Colon Obstruction and Pneumotosis. She was then transferred here to [facility] in [place] for Surgical evaluation. Pt c/o abd pain controlled with current analgesia, thirst and some nausea. Labs reveal dehydration. WBC nml. NGT to suction. Awaiting GenSurg eval.    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  2.Sigmoid colon resection.  3.Drainage of abscess. 4.Repair of fistula to the bladder.  5.Excision leiomyoma of the uterus. 6.Takedown splenic flexure.  Pt feels "OK" today with abd pain from surgery.     1/22/23  - No new complaints today. Still not feeling well with abd pain when she moves around, otherwise comfortable at rest. Hgb " 8.5 from 10.6. She has been somewhat hypOtensive and BUN/Cr slightly increased.  Will transfuse 2u PRBC's which will also give volume expansion and hopefully increased renal perfusion.    1/23/23  - NGT placed. No acute complaints. Cr 1.81 improved from 2.00 yest.    1/24/23  - Feeling better. Cr 1.29 from 1.81.    1/25/23  - No acute complaints. Passing some flatus. Cr 1.07 from 1.29.         OBJECTIVE:     Vital Signs (Most Recent)  Temp: 98.2 °F (36.8 °C) (01/25/23 1102)  Pulse: 81 (01/25/23 1102)  Resp: 19 (01/25/23 1102)  BP: (!) 147/81 (01/25/23 1102)  SpO2: (!) 93 % (01/25/23 1320)      Vital Signs Range (Last 24H):  Temp:  [96.8 °F (36 °C)-99.5 °F (37.5 °C)]   Pulse:  [75-90]   Resp:  [18-20]   BP: (131-156)/(76-95)   SpO2:  [93 %-96 %]  .vi    I & O (Last 24H):  Intake/Output Summary (Last 24 hours) at 1/25/2023 1422  Last data filed at 1/25/2023 1143  Gross per 24 hour   Intake --   Output 1540 ml   Net -1540 ml         Scheduled Meds:   enoxaparin  30 mg Subcutaneous Daily    famotidine (PF)  20 mg Intravenous Daily    mupirocin   Nasal BID    ondansetron  4 mg Oral Once    piperacillin-tazobactam (ZOSYN) IVPB  4.5 g Intravenous Q12H    sodium chloride 0.9%  1,000 mL Intravenous Once    sodium chloride 0.9%  10 mL Intravenous Q6H     Continuous Infusions:   dextrose 5% lactated ringers 125 mL/hr at 01/25/23 1143     PRN Meds:   sodium chloride    diphenhydrAMINE    HYDROmorphone    HYDROmorphone    melatonin    naloxone    ondansetron    oxyCODONE    sodium chloride 0.9%    sodium chloride 0.9%      Lines/Drains:   Lines/Drains/Airways       Peripherally Inserted Central Catheter Line  Duration             PICC Double Lumen 01/25/23 1348 right basilic <1 day              Drain  Duration                  Closed/Suction Drain 01/20/23 RLQ Bulb 5 days         Colostomy 01/20/23 LUQ 5 days         Urethral Catheter 01/20/23 Silicone 5 days         NG/OG Tube 01/23/23 1500 nasogastric 16 Fr. Left nostril 1 day                         Physical Exam  Constitutional:       General: She is awake. She is not in acute distress.     Appearance: She is well-developed. She is not ill-appearing.   HENT:      Mouth/Throat:      Mouth: Mucous membranes are moist.      Pharynx: Oropharynx is clear.   Cardiovascular:      Rate and Rhythm: Normal rate and regular rhythm.   Pulmonary:      Effort: Pulmonary effort is normal.      Breath sounds: Normal breath sounds.   Abdominal:      Palpations: Abdomen is soft.      Tenderness: There is abdominal tenderness.   Skin:     General: Skin is warm and dry.   Neurological:      General: No focal deficit present.      Mental Status: She is alert and oriented to person, place, and time. Mental status is at baseline.   Psychiatric:         Behavior: Behavior is cooperative.        Recent Labs   Lab 01/23/23  0306 01/24/23  1705 01/25/23  0418   WBC 11.3 10.7 7.7   HGB 11.0* 11.3* 11.5*   HCT 33.3* 34.6* 35.6*    242 268   MCV 86.9 87.2 86.8        Recent Labs   Lab 01/22/23  0438 01/23/23  0306 01/24/23  1705 01/25/23  0418    134 138 138   K 4.2 3.7 4.1 3.4*   CHLORIDE 100 100 103 104   CO2 23 23 24 25   BUN 38.0* 37.0* 30.0* 27.0*   CREATININE 2.00* 1.81* 1.29* 1.07*   GLUCOSE 140* 222* 123* 109   MG 1.50* 1.50*  --  2.20   PHOS 3.1 2.7  --  2.3   CALCIUM 8.0* 8.3* 8.6 8.3*   ALBUMIN 2.7* 2.5* 2.1* 2.2*   BILITOT 0.5 0.6 0.6 0.6   ALKPHOS 60 84 93 106   AST 21 20 28 18   ALT 8 5 5 5       No results found for: LABA1C, HGBA1C     X-Ray Chest 1 View for Line/Tube Placement  Narrative: EXAMINATION:  XR CHEST 1 VIEW FOR LINE/TUBE PLACEMENT    CLINICAL HISTORY:  ; picc;.    COMPARISON:  None available.    FINDINGS:  Single AP view reveals the heart to be mildly enlarged.  The trachea is mostly midline.  Atherosclerosis is seen within the aorta.  A right PICC line is present with the tip superimposed over the SVC.  NG tube is present with the tip not included on the exam.  The side hole  appears to be near the GE junction.  No infiltrate or effusion is seen.  There is mild elevation right hemidiaphragm.  There is mild prominence of the right hilum suspicious for central pulmonary vasculature.  Impression: 1. Mild cardiomegaly  2. Atherosclerosis  3. Mildly elevated right hemidiaphragm  4. NG tube  5. Right PICC line    Electronically signed by: David Jones  Date:    01/25/2023  Time:    14:17           ASSESSMENT/PLAN:       Colon obstruction    TYLER (acute kidney injury)    Dehydration    Postoperative anemia    CAD (coronary artery disease)    PVD (peripheral vascular disease)    Hypertension       - cont current  - GenSurg mgt  - monitor labs  - IVF  - PICC placed today        VTE Risk Mitigation (From admission, onward)           Ordered     enoxaparin injection 30 mg  Daily         01/20/23 2310     IP VTE HIGH RISK PATIENT  Once         01/20/23 2310     Place sequential compression device  Until discontinued         01/20/23 0633     Place RADHIKA hose  Until discontinued         01/20/23 0633                           Sachin De Oliveira MD  [facility] Medicine   [facility]

## 2023-01-25 NOTE — PROGRESS NOTES
Patient feeling well and better  Nausea subsiding  Getting oob more    Vs ok  A+ox3, nad  Eomi  Abd soft, stoma productive of moderate stool, small air  Inc c/d/I with packing upper/lower poles    Labs -   WBC 7.7, HGB 11.5, Plt 268  Na 138, K 3.4  BUN/CR 27/1    Culture abscess   E coli, Citrobacter braaki, Enterococcus - all sensitive to zosyn         89F s/p sigmoid colon resection for obstructing stricture with abscess and colovesical fistula - she is better.  Getting oob more.  Awaiting bowel function.     Plan for:  1) Cont zosyn x 7 days  2) OOB and walk  3) Cont ngt till better bowel function and nausea completely gone  4) Start TPN  5) Cont Blum

## 2023-01-25 NOTE — PROGRESS NOTES
PICC placed (1/25). RD consulted for TPN recs.     Recommend Clinimix E 5/20 @ 60 ml/hr to provide 1267 kcal/d, 72 g amino acids/d, 1440 ml fluid/d. GIR - 2.6 mg/kg/min          Order intralipids 20% 250 ml for additional 500 kcals every other day (MWF)         * Clinimix + Lipids will provide ~100% estimated nutrition needs     2.    Monitor daily BMP, Mg and Phos and replace lytes as needed.    3.    Weekly Wts       Estimated Needs     Weight Used For Calorie Calculations: 56.7 kg (125 lb)  Energy Calorie Requirements (kcal): 7898-9712 kcal/d (25-30 kcal/kg)  Weight Used For Protein Calculations: 56.7 kg (125 lb)  Protein Requirements: 56 - 68 g Pro/d (1 - 1.2 g/kg)  Fluid Requirements (mL): 9275-5737 ml/d (25-30 ml/kg)

## 2023-01-25 NOTE — PROGRESS NOTES
"[facility]    Patient Name: Bailey Medina  Age: 89 y.o.   MRN: [id number]  Admission Date: 1/20/2023  4:00 AM   Patient Class: IP- Inpatient  Benefits: Payor: MEDICARE / Plan: MEDICARE PART A & B / Product Type: Government /    Primary Care Provider: Magdaleno Pruett MD   Pharmacy:   [facility] - [facility] [address]  [address] [facility] [address]  Phone: [phone] Fax: [phone]    Code Status: Full Code      Chief Complaint:   Chief Complaint   Patient presents with    Abdominal Pain     Pt transferred from [facility], dx with SBO        Admit Diagnosis:   Dehydration [E86.0]  Small bowel obstruction [K56.609]  Large bowel obstruction [K56.609]  Nausea and vomiting, unspecified vomiting type [R11.2]     HPI:  89yo WF with h/o CAD, PVD, HTN, megadiverticulum initially presented to ED in [place] c/o Abd Pain with Liquid Diarrhea x2 days. She began having N/V as well and decided to seek care. Workup there with CT A/P suggested SBO/Colon Obstruction and Pneumotosis. She was then transferred here to [facility] in [place] for Surgical evaluation. Pt c/o abd pain controlled with current analgesia, thirst and some nausea. Labs reveal dehydration. WBC nml. NGT to suction. Awaiting GenSurg eval.    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  2.Sigmoid colon resection.  3.Drainage of abscess. 4.Repair of fistula to the bladder.  5.Excision leiomyoma of the uterus. 6.Takedown splenic flexure.  Pt feels "OK" today with abd pain from surgery.     1/22/23  - No new complaints today. Still not feeling well with abd pain when she moves around, otherwise comfortable at rest. Hgb " 8.5 from 10.6. She has been somewhat hypOtensive and BUN/Cr slightly increased.  Will transfuse 2u PRBC's which will also give volume expansion and hopefully increased renal perfusion.    1/23/23  - NGT placed. No acute complaints. Cr 1.81 improved from 2.00 yest.        OBJECTIVE:       Scheduled Meds:   enoxaparin  30 mg Subcutaneous Daily    famotidine (PF)  20 mg Intravenous Daily    mupirocin   Nasal BID    ondansetron  4 mg Oral Once    piperacillin-tazobactam (ZOSYN) IVPB  4.5 g Intravenous Q12H    sodium chloride 0.9%  1,000 mL Intravenous Once    sodium chloride 0.9%  10 mL Intravenous Q6H     Continuous Infusions:   dextrose 5% lactated ringers 125 mL/hr at 01/25/23 1143     PRN Meds:   sodium chloride    diphenhydrAMINE    HYDROmorphone    HYDROmorphone    melatonin    naloxone    ondansetron    oxyCODONE    sodium chloride 0.9%    sodium chloride 0.9%           Drain  Duration                  Closed/Suction Drain 01/20/23 RLQ Bulb 5 days         Colostomy 01/20/23 LUQ 5 days         Urethral Catheter 01/20/23 Silicone 5 days         NG/OG Tube 01/23/23 1500 nasogastric 16 Fr. Left nostril 1 day                    Vitals reviewed    Physical Exam  Constitutional:       General: She is awake. She is not in acute distress.     Appearance: She is well-developed. She is not ill-appearing.   HENT:      Mouth/Throat:      Mouth: Mucous membranes are moist.      Pharynx: Oropharynx is clear.   Cardiovascular:      Rate and Rhythm: Normal rate and regular rhythm.   Pulmonary:      Effort: Pulmonary effort is normal.      Breath sounds: Normal breath sounds.   Abdominal:      Palpations: Abdomen is soft.      Tenderness: There is abdominal tenderness.   Skin:     General: Skin is warm and dry.   Neurological:      General: No focal deficit present.      Mental Status: She is alert and oriented to person, place, and time. Mental status is at baseline.   Psychiatric:         Behavior: Behavior is cooperative.         Recent Labs   Lab 01/23/23  0306   WBC 11.3   HGB 11.0*   HCT 33.3*      MCV 86.9          Recent Labs   Lab 01/22/23  0438 01/23/23  0306    134   K 4.2 3.7   CHLORIDE 100 100   CO2 23 23   BUN 38.0* 37.0*   CREATININE 2.00* 1.81*   GLUCOSE 140* 222*   MG 1.50* 1.50*   PHOS 3.1 2.7   CALCIUM 8.0* 8.3*   ALBUMIN 2.7* 2.5*   BILITOT 0.5 0.6   ALKPHOS 60 84   AST 21 20   ALT 8 5             ASSESSMENT/PLAN:       Colon obstruction    TYLER (acute kidney injury)    Dehydration    Postoperative anemia    CAD (coronary artery disease)    PVD (peripheral vascular disease)    Hypertension       - cont current  - GenSurg mgt  - monitor labs  - IVF  - NGT to ILWS          VTE Risk Mitigation (From admission, onward)           Ordered     enoxaparin injection 30 mg  Daily         01/20/23 2310     IP VTE HIGH RISK PATIENT  Once         01/20/23 2310     Place sequential compression device  Until discontinued         01/20/23 0633     Place RADHIKA hose  Until discontinued         01/20/23 0633                           Sachin De Oliveira MD  [facility] Medicine   [facility]

## 2023-01-26 LAB
ALBUMIN SERPL-MCNC: 1.8 G/DL (ref 3.4–4.8)
BACTERIA BLD CULT: NORMAL
BACTERIA BLD CULT: NORMAL
BACTERIA SPEC ANAEROBE CULT: ABNORMAL
BACTERIA SPEC ANAEROBE CULT: ABNORMAL
BASOPHILS # BLD AUTO: 0.05 X10(3)/MCL (ref 0–0.2)
BASOPHILS NFR BLD AUTO: 0.6 %
BUN SERPL-MCNC: 21 MG/DL (ref 9.8–20.1)
CALCIUM SERPL-MCNC: 8.3 MG/DL (ref 8.4–10.2)
CHLORIDE SERPL-SCNC: 106 MMOL/L (ref 98–111)
CO2 SERPL-SCNC: 23 MMOL/L (ref 23–31)
CREAT SERPL-MCNC: 0.88 MG/DL (ref 0.55–1.02)
EOSINOPHIL # BLD AUTO: 0.38 X10(3)/MCL (ref 0–0.9)
EOSINOPHIL NFR BLD AUTO: 4.7 %
ERYTHROCYTE [DISTWIDTH] IN BLOOD BY AUTOMATED COUNT: 13.8 % (ref 11.5–17)
GFR SERPLBLD CREATININE-BSD FMLA CKD-EPI: >60 MLS/MIN/1.73/M2
GLUCOSE SERPL-MCNC: 122 MG/DL (ref 75–121)
HCT VFR BLD AUTO: 36.5 % (ref 37–47)
HGB BLD-MCNC: 11.7 GM/DL (ref 12–16)
IMM GRANULOCYTES # BLD AUTO: 0.04 X10(3)/MCL (ref 0–0.04)
IMM GRANULOCYTES NFR BLD AUTO: 0.5 %
LYMPHOCYTES # BLD AUTO: 0.99 X10(3)/MCL (ref 0.6–4.6)
LYMPHOCYTES NFR BLD AUTO: 12.2 %
MAGNESIUM SERPL-MCNC: 1.9 MG/DL (ref 1.6–2.6)
MCH RBC QN AUTO: 28.5 PG
MCHC RBC AUTO-ENTMCNC: 32.1 MG/DL (ref 33–36)
MCV RBC AUTO: 89 FL (ref 80–94)
MONOCYTES # BLD AUTO: 0.84 X10(3)/MCL (ref 0.1–1.3)
MONOCYTES NFR BLD AUTO: 10.3 %
NEUTROPHILS # BLD AUTO: 5.82 X10(3)/MCL (ref 2.1–9.2)
NEUTROPHILS NFR BLD AUTO: 71.7 %
PHOSPHATE SERPL-MCNC: 2.5 MG/DL (ref 2.3–4.7)
PLATELET # BLD AUTO: 237 X10(3)/MCL (ref 130–400)
PMV BLD AUTO: 9.7 FL (ref 7.4–10.4)
POTASSIUM SERPL-SCNC: 3.3 MMOL/L (ref 3.5–5.1)
RBC # BLD AUTO: 4.1 X10(6)/MCL (ref 4.2–5.4)
SODIUM SERPL-SCNC: 140 MMOL/L (ref 132–146)
WBC # SPEC AUTO: 8.1 X10(3)/MCL (ref 4.5–11.5)

## 2023-01-26 PROCEDURE — 63600175 PHARM REV CODE 636 W HCPCS: Performed by: INTERNAL MEDICINE

## 2023-01-26 PROCEDURE — 83735 ASSAY OF MAGNESIUM: CPT | Performed by: SURGERY

## 2023-01-26 PROCEDURE — 97530 THERAPEUTIC ACTIVITIES: CPT

## 2023-01-26 PROCEDURE — 25000003 PHARM REV CODE 250: Performed by: SURGERY

## 2023-01-26 PROCEDURE — 94799 UNLISTED PULMONARY SVC/PX: CPT

## 2023-01-26 PROCEDURE — 99900035 HC TECH TIME PER 15 MIN (STAT)

## 2023-01-26 PROCEDURE — A4216 STERILE WATER/SALINE, 10 ML: HCPCS | Performed by: SURGERY

## 2023-01-26 PROCEDURE — C9113 INJ PANTOPRAZOLE SODIUM, VIA: HCPCS | Performed by: SURGERY

## 2023-01-26 PROCEDURE — 11000001 HC ACUTE MED/SURG PRIVATE ROOM

## 2023-01-26 PROCEDURE — 63600175 PHARM REV CODE 636 W HCPCS: Performed by: SURGERY

## 2023-01-26 PROCEDURE — 80069 RENAL FUNCTION PANEL: CPT | Performed by: INTERNAL MEDICINE

## 2023-01-26 PROCEDURE — 94761 N-INVAS EAR/PLS OXIMETRY MLT: CPT

## 2023-01-26 PROCEDURE — 85025 COMPLETE CBC W/AUTO DIFF WBC: CPT | Performed by: SURGERY

## 2023-01-26 RX ORDER — POTASSIUM CHLORIDE 7.45 MG/ML
10 INJECTION INTRAVENOUS ONCE
Status: COMPLETED | OUTPATIENT
Start: 2023-01-26 | End: 2023-01-26

## 2023-01-26 RX ADMIN — FAMOTIDINE 20 MG: 10 INJECTION INTRAVENOUS at 10:01

## 2023-01-26 RX ADMIN — SODIUM CHLORIDE, PRESERVATIVE FREE 10 ML: 5 INJECTION INTRAVENOUS at 05:01

## 2023-01-26 RX ADMIN — SODIUM CHLORIDE, PRESERVATIVE FREE 10 ML: 5 INJECTION INTRAVENOUS at 12:01

## 2023-01-26 RX ADMIN — OXYCODONE HYDROCHLORIDE 5 MG: 5 TABLET ORAL at 09:01

## 2023-01-26 RX ADMIN — PANTOPRAZOLE SODIUM 8 MG/HR: 40 INJECTION, POWDER, FOR SOLUTION INTRAVENOUS at 11:01

## 2023-01-26 RX ADMIN — PANTOPRAZOLE SODIUM 8 MG/HR: 40 INJECTION, POWDER, FOR SOLUTION INTRAVENOUS at 06:01

## 2023-01-26 RX ADMIN — Medication 6 MG: at 09:01

## 2023-01-26 RX ADMIN — PANTOPRAZOLE SODIUM 40 MG: 40 INJECTION, POWDER, FOR SOLUTION INTRAVENOUS at 09:01

## 2023-01-26 RX ADMIN — LEUCINE, PHENYLALANINE, LYSINE, METHIONINE, ISOLEUCINE, VALINE, HISTIDINE, THREONINE, TRYPTOPHAN, ALANINE, GLYCINE, ARGININE, PROLINE, SERINE, TYROSINE, SODIUM ACETATE, DIBASIC POTASSIUM PHOSPHATE, MAGNESIUM CHLORIDE, SODIUM CHLORIDE, CALCIUM CHLORIDE, DEXTROSE 2000 ML
365; 280; 290; 200; 300; 290; 240; 210; 90; 1035; 515; 575; 340; 250; 20; 340; 261; 51; 59; 33; 20 INJECTION INTRAVENOUS at 07:01

## 2023-01-26 RX ADMIN — POTASSIUM CHLORIDE 10 MEQ: 7.46 INJECTION, SOLUTION INTRAVENOUS at 11:01

## 2023-01-26 RX ADMIN — ENOXAPARIN SODIUM 30 MG: 30 INJECTION SUBCUTANEOUS at 04:01

## 2023-01-26 RX ADMIN — SODIUM CHLORIDE, PRESERVATIVE FREE 10 ML: 5 INJECTION INTRAVENOUS at 06:01

## 2023-01-26 RX ADMIN — PANTOPRAZOLE SODIUM 8 MG/HR: 40 INJECTION, POWDER, FOR SOLUTION INTRAVENOUS at 05:01

## 2023-01-26 RX ADMIN — PIPERACILLIN AND TAZOBACTAM 4.5 G: 4; .5 INJECTION, POWDER, LYOPHILIZED, FOR SOLUTION INTRAVENOUS; PARENTERAL at 06:01

## 2023-01-26 RX ADMIN — SODIUM CHLORIDE, SODIUM LACTATE, POTASSIUM CHLORIDE, CALCIUM CHLORIDE AND DEXTROSE MONOHYDRATE: 5; 600; 310; 30; 20 INJECTION, SOLUTION INTRAVENOUS at 05:01

## 2023-01-26 RX ADMIN — PIPERACILLIN AND TAZOBACTAM 4.5 G: 4; .5 INJECTION, POWDER, LYOPHILIZED, FOR SOLUTION INTRAVENOUS; PARENTERAL at 05:01

## 2023-01-26 NOTE — PT/OT/SLP PROGRESS
Physical Therapy Treatment    Patient Name:  Bailey Medina   MRN:  [id number]    Recommendations:     Discharge Recommendations:    Discharge Equipment Recommendations:    Barriers to discharge: None    Assessment:     Bailey Medina is a 89 y.o. female admitted with a medical diagnosis of Colon obstruction.  She presents with the following impairments/functional limitations: weakness, impaired endurance, impaired balance, gait instability, impaired functional mobility, pain.    Pt complaining of abdominal pain but with encouragement was agreeable to mobility. She ambulated 5' and then another 8' with RW and min A. She had slow berkley with decreased step length. Pt is limited by pain. Encouraged her to increase time OOB, reviewed exercises.     Rehab Prognosis: Good; patient would benefit from acute skilled PT services to address these deficits and reach maximum level of function.    Recent Surgery: Procedure(s) (LRB):  LAPAROTOMY, EXPLORATORY (N/A)  CREATION, COLOSTOMY (N/A)  CLOSURE, FISTULA, COLOVESICAL (N/A)  COLECTOMY, SIGMOID (N/A)  DRAINAGE, ABSCESS (N/A)  DISSECTION-SPLENIC FLEXURE (N/A)  EXCISION, LESION, UTERUS (N/A) 6 Days Post-Op    Plan:     During this hospitalization, patient to be seen daily to address the identified rehab impairments via gait training, therapeutic activities, therapeutic exercises and progress toward the following goals:    Plan of Care Expires:  02/21/23    Subjective     Chief Complaint: pain and nausea  Patient/Family Comments/goals: to feel better and get stronger  Pain/Comfort:         Objective:     Communicated with patient prior to session.  Patient found HOB elevated with   upon PT entry to room.     General Precautions: Standard, fall  Orthopedic Precautions:    Braces:    Respiratory Status: Room air     Functional Mobility:  Bed Mobility:     Supine to Sit: moderate assistance and maximal assistance  Transfers:     Sit to Stand:  moderate assistance with rolling  walker  Gait: 5' and 8' with RW and min-mod A  Balance: mod A sitting      AM-PAC 6 CLICK MOBILITY          Treatment & Education:  See above    Patient left up in chair with all lines intact and call button in reach..    GOALS:   Multidisciplinary Problems       Physical Therapy Goals          Problem: Physical Therapy    Goal Priority Disciplines Outcome Goal Variances Interventions   Physical Therapy Goal     PT, PT/OT Ongoing, Progressing     Description: Goals to be met by: 23.     Patient will increase functional independence with mobility by performin. Supine to sit with MInimal Assistance  2. Bed to chair transfer with Minimal Assistance using stand and pivot transfer.                         Time Tracking:     PT Received On: 23  PT Start Time: 910     PT Stop Time: 930  PT Total Time (min): 20 min     Billable Minutes: Therapeutic Activity 20    Treatment Type: Treatment  PT/PTA: PTA           2023

## 2023-01-26 NOTE — PROGRESS NOTES
"[facility]  Hospital Medicine  Progress Note    Patient Name: Bailey Medina  MRN: [id number]  Patient Class: IP- Inpatient   Admission Date: 1/20/2023  Length of Stay: 6 days  Attending Physician: Sachin De Oliveira MD  Primary Care Provider: Magdaleno Pruett MD        Subjective:     Principal Problem:Colon obstruction        HPI:  89yo WF with h/o CAD, PVD, HTN, megadiverticulum initially presented to ED in [place] c/o Abd Pain with Liquid Diarrhea x2 days. She began having N/V as well and decided to seek care. Workup there with CT A/P suggested SBO/Colon Obstruction and Pneumotosis. She was then transferred here to [facility] in [place] for Surgical evaluation. Pt c/o abd pain controlled with current analgesia, thirst and some nausea. Labs reveal dehydration. WBC nml. NGT to suction. Awaiting GenSurg eval.       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  2.Sigmoid colon resection.  3.Drainage of abscess. 4.Repair of fistula to the bladder.  5.Excision leiomyoma of the uterus. 6.Takedown splenic flexure.  Pt feels "OK" today with abd pain from surgery.     1/22/23  - No new complaints today. Still not feeling well with abd pain when she moves around, otherwise comfortable at rest. Hgb 8.5 from 10.6. She has been somewhat hypOtensive and BUN/Cr slightly increased.  Will transfuse 2u PRBC's which will also give volume expansion and hopefully increased renal perfusion.    1/23/23  - NGT placed. No acute complaints. Cr 1.81 improved from 2.00 yest.    1/24/23  - Feeling better. Cr 1.29 from 1.81.    1/25/23  - No acute complaints. Passing some flatus. Cr 1.07 from 1.29.    1/26/23-Patient states some flatus.  Still has NG tube in place but hopefully surgery will remove soon.        Interval History: "     Review of Systems   Constitutional:  Positive for activity change and fatigue.   HENT: Negative.     Eyes: Negative.    Respiratory: Negative.     Cardiovascular: Negative.    Gastrointestinal:  Positive for abdominal pain.   Endocrine: Negative.    Genitourinary: Negative.    Musculoskeletal: Negative.    Skin: Negative.    Allergic/Immunologic: Negative.    Neurological:  Positive for weakness.   Hematological: Negative.    Psychiatric/Behavioral: Negative.     Objective:     Vital Signs (Most Recent):  Temp: 97.3 °F (36.3 °C) (01/26/23 1232)  Pulse: 80 (01/26/23 1232)  Resp: 19 (01/25/23 1102)  BP: (!) 137/95 (01/26/23 1232)  SpO2: 96 % (01/26/23 1232)   Vital Signs (24h Range):  Temp:  [97.2 °F (36.2 °C)-99.7 °F (37.6 °C)] 97.3 °F (36.3 °C)  Pulse:  [65-83] 80  SpO2:  [95 %-97 %] 96 %  BP: (119-158)/(78-95) 137/95     Weight: 78 kg (171 lb 15.3 oz)  Body mass index is 32.49 kg/m².    Intake/Output Summary (Last 24 hours) at 1/26/2023 1418  Last data filed at 1/26/2023 1151  Gross per 24 hour   Intake --   Output 1275 ml   Net -1275 ml      Physical Exam  Constitutional:       Appearance: Normal appearance. She is normal weight.   HENT:      Head: Normocephalic and atraumatic.      Nose: Nose normal.      Mouth/Throat:      Mouth: Mucous membranes are moist.      Pharynx: Oropharynx is clear.   Eyes:      Extraocular Movements: Extraocular movements intact.      Conjunctiva/sclera: Conjunctivae normal.      Pupils: Pupils are equal, round, and reactive to light.   Cardiovascular:      Rate and Rhythm: Normal rate and regular rhythm.      Pulses: Normal pulses.      Heart sounds: Normal heart sounds.   Pulmonary:      Effort: Pulmonary effort is normal.      Breath sounds: Normal breath sounds.   Abdominal:      General: Bowel sounds are decreased.      Palpations: Abdomen is soft.   Musculoskeletal:         General: Normal range of motion.      Cervical back: Normal range of motion and neck supple.   Skin:      General: Skin is warm and dry.      Capillary Refill: Capillary refill takes 2 to 3 seconds.   Neurological:      General: No focal deficit present.      Mental Status: She is alert and oriented to person, place, and time. Mental status is at baseline.   Psychiatric:         Mood and Affect: Mood normal.         Behavior: Behavior normal.         Thought Content: Thought content normal.         Judgment: Judgment normal.       Significant Labs: All pertinent labs within the past 24 hours have been reviewed.  BMP:   Recent Labs   Lab 01/26/23  0343      K 3.3*   CO2 23   BUN 21.0*   CREATININE 0.88   CALCIUM 8.3*   MG 1.90     CBC:   Recent Labs   Lab 01/24/23  1705 01/25/23  0418 01/26/23  0343   WBC 10.7 7.7 8.1   HGB 11.3* 11.5* 11.7*   HCT 34.6* 35.6* 36.5*    268 237     CMP:   Recent Labs   Lab 01/24/23  1705 01/25/23 0418 01/26/23  0343    138 140   K 4.1 3.4* 3.3*   CO2 24 25 23   BUN 30.0* 27.0* 21.0*   CREATININE 1.29* 1.07* 0.88   CALCIUM 8.6 8.3* 8.3*   ALBUMIN 2.1* 2.2* 1.8*   BILITOT 0.6 0.6  --    ALKPHOS 93 106  --    AST 28 18  --    ALT 5 5  --      Magnesium:   Recent Labs   Lab 01/25/23 0418 01/26/23  0343   MG 2.20 1.90       Significant Imaging: I have reviewed all pertinent imaging results/findings within the past 24 hours.      Assessment/Plan:      * Colon obstruction  Surgery following  S/p resection  NG tube in place  Follow labs  OOB      Hypertension  monitor      CAD (coronary artery disease)  Monitor  Resume home meds once tolerating diet        VTE Risk Mitigation (From admission, onward)         Ordered     enoxaparin injection 30 mg  Daily         01/20/23 2310     IP VTE HIGH RISK PATIENT  Once         01/20/23 2310     Place sequential compression device  Until discontinued         01/20/23 0633     Place RADHIKA hose  Until discontinued         01/20/23 0633              OOB  ambulate  Therapy  Follow labs  DVT prophylaxis  Surgery following  Discharge Planning    KEYANNA:      Code Status: Full Code   Is the patient medically ready for discharge?:     Reason for patient still in hospital (select all that apply): Laboratory test, Treatment, Consult recommendations, PT / OT recommendations and Pending disposition  Discharge Plan A: Home with family                  Kip Hylotn MD  Department of Hospital Medicine   [facility]

## 2023-01-26 NOTE — SUBJECTIVE & OBJECTIVE
Interval History:     Review of Systems   Constitutional:  Positive for activity change and fatigue.   HENT: Negative.     Eyes: Negative.    Respiratory: Negative.     Cardiovascular: Negative.    Gastrointestinal:  Positive for abdominal pain.   Endocrine: Negative.    Genitourinary: Negative.    Musculoskeletal: Negative.    Skin: Negative.    Allergic/Immunologic: Negative.    Neurological:  Positive for weakness.   Hematological: Negative.    Psychiatric/Behavioral: Negative.     Objective:     Vital Signs (Most Recent):  Temp: 97.3 °F (36.3 °C) (01/26/23 1232)  Pulse: 80 (01/26/23 1232)  Resp: 19 (01/25/23 1102)  BP: (!) 137/95 (01/26/23 1232)  SpO2: 96 % (01/26/23 1232)   Vital Signs (24h Range):  Temp:  [97.2 °F (36.2 °C)-99.7 °F (37.6 °C)] 97.3 °F (36.3 °C)  Pulse:  [65-83] 80  SpO2:  [95 %-97 %] 96 %  BP: (119-158)/(78-95) 137/95     Weight: 78 kg (171 lb 15.3 oz)  Body mass index is 32.49 kg/m².    Intake/Output Summary (Last 24 hours) at 1/26/2023 1418  Last data filed at 1/26/2023 1151  Gross per 24 hour   Intake --   Output 1275 ml   Net -1275 ml      Physical Exam  Constitutional:       Appearance: Normal appearance. She is normal weight.   HENT:      Head: Normocephalic and atraumatic.      Nose: Nose normal.      Mouth/Throat:      Mouth: Mucous membranes are moist.      Pharynx: Oropharynx is clear.   Eyes:      Extraocular Movements: Extraocular movements intact.      Conjunctiva/sclera: Conjunctivae normal.      Pupils: Pupils are equal, round, and reactive to light.   Cardiovascular:      Rate and Rhythm: Normal rate and regular rhythm.      Pulses: Normal pulses.      Heart sounds: Normal heart sounds.   Pulmonary:      Effort: Pulmonary effort is normal.      Breath sounds: Normal breath sounds.   Abdominal:      General: Bowel sounds are decreased.      Palpations: Abdomen is soft.   Musculoskeletal:         General: Normal range of motion.      Cervical back: Normal range of motion and neck  supple.   Skin:     General: Skin is warm and dry.      Capillary Refill: Capillary refill takes 2 to 3 seconds.   Neurological:      General: No focal deficit present.      Mental Status: She is alert and oriented to person, place, and time. Mental status is at baseline.   Psychiatric:         Mood and Affect: Mood normal.         Behavior: Behavior normal.         Thought Content: Thought content normal.         Judgment: Judgment normal.       Significant Labs: All pertinent labs within the past 24 hours have been reviewed.  BMP:   Recent Labs   Lab 01/26/23  0343      K 3.3*   CO2 23   BUN 21.0*   CREATININE 0.88   CALCIUM 8.3*   MG 1.90     CBC:   Recent Labs   Lab 01/24/23  1705 01/25/23 0418 01/26/23  0343   WBC 10.7 7.7 8.1   HGB 11.3* 11.5* 11.7*   HCT 34.6* 35.6* 36.5*    268 237     CMP:   Recent Labs   Lab 01/24/23  1705 01/25/23 0418 01/26/23  0343    138 140   K 4.1 3.4* 3.3*   CO2 24 25 23   BUN 30.0* 27.0* 21.0*   CREATININE 1.29* 1.07* 0.88   CALCIUM 8.6 8.3* 8.3*   ALBUMIN 2.1* 2.2* 1.8*   BILITOT 0.6 0.6  --    ALKPHOS 93 106  --    AST 28 18  --    ALT 5 5  --      Magnesium:   Recent Labs   Lab 01/25/23 0418 01/26/23  0343   MG 2.20 1.90       Significant Imaging: I have reviewed all pertinent imaging results/findings within the past 24 hours.

## 2023-01-26 NOTE — PHYSICIAN QUERY
PT Name: Bailey Medina                   SUSANNAH PORTER  (Due to lack of provider response)  MR #: [id number]    DOCUMENTATION CLARIFICATION     CDS: Beronica De Oliveira RN      Contact information:[email or web address]     This form is a permanent document in the medical record.     Query Date: January 26, 2023    Dear Provider,  By submitting this query, we are merely seeking further clarification of documentation. Please utilize your independent clinical judgment when addressing the question(s) below.    The medical record contains the following:  Supporting Clinical Findings Location in Medical Record   She appears to have some element of ileus, although she is passing flatus and stool from stoma.  Her nausea  and axr support the ileus-type picture.   1/23 General Surgery PN   1. Gas distended loops of small bowel throughout the abdomen suggesting ileus versus bowel obstruction.  Clinical correlation is indicated  2. Surgical staples at the right lower abdomen/pelvis with catheter at the pelvis extending from the right suggesting a drainage catheter.  Clinical correlation is indicated  3. Suspect Blum catheter at the midline lower pelvis.  Clinical correlation is indicated   1/23 X-Ray Abdomen Flat And Erect   PREOPERATIVE DIAGNOSES:    1. Obstruction of sigmoid colon, possible mass versus stricture.   2. Urinary tract infection.   3. Fecal incontinence.     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    OPERATION:    1. Exploratory laparotomy.    2. Sigmoid colon resection.    3. Drainage of abscess.   4. Repair of fistula to the bladder.    5. Excision leiomyoma of the uterus.  6. Takedown splenic flexure.      FINDINGS:    1. Very distended colon with extremely distended cecum with serosal tear that   was repaired at the end of the case.  All colon viable.  2. Sigmoid colon obstruction related to diverticular stricture.  Abscess  versus   donte diverticulum-drained.  3. 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   4. Pedunculated leiomyoma of the uterus-excised.    1/20/23 General Surgery Op Note   NGT to LIWS 1/23 Physician Order   Cont ngt till better bowel function and nausea completely gone 1/25 General Surgery PN       Please clarify if the Ileus (as it relates to the above Exploratory laparotomy sigmoid colon resection surgery) is:      [  ] Inherent/Integral to the procedure     [  ] Complication of the procedure   [  ] Present, but not a complication of the procedure     [  ] Other (please specify): __________________     [  ] Clinically Undetermined       Please document in your progress notes daily for the duration of treatment until resolved and include in your discharge summary.

## 2023-01-27 LAB
ALBUMIN SERPL-MCNC: 1.7 G/DL (ref 3.4–4.8)
BASOPHILS # BLD AUTO: 0.05 X10(3)/MCL (ref 0–0.2)
BASOPHILS NFR BLD AUTO: 0.7 %
BUN SERPL-MCNC: 19 MG/DL (ref 9.8–20.1)
CALCIUM SERPL-MCNC: 7.9 MG/DL (ref 8.4–10.2)
CHLORIDE SERPL-SCNC: 107 MMOL/L (ref 98–111)
CO2 SERPL-SCNC: 25 MMOL/L (ref 23–31)
CREAT SERPL-MCNC: 0.8 MG/DL (ref 0.55–1.02)
EOSINOPHIL # BLD AUTO: 0.21 X10(3)/MCL (ref 0–0.9)
EOSINOPHIL NFR BLD AUTO: 2.8 %
ERYTHROCYTE [DISTWIDTH] IN BLOOD BY AUTOMATED COUNT: 13.8 % (ref 11.5–17)
GFR SERPLBLD CREATININE-BSD FMLA CKD-EPI: >60 MLS/MIN/1.73/M2
GLUCOSE SERPL-MCNC: 166 MG/DL (ref 75–121)
HCT VFR BLD AUTO: 33.3 % (ref 37–47)
HGB BLD-MCNC: 10.4 GM/DL (ref 12–16)
IMM GRANULOCYTES # BLD AUTO: 0.06 X10(3)/MCL (ref 0–0.04)
IMM GRANULOCYTES NFR BLD AUTO: 0.8 %
LYMPHOCYTES # BLD AUTO: 1.26 X10(3)/MCL (ref 0.6–4.6)
LYMPHOCYTES NFR BLD AUTO: 17 %
MAGNESIUM SERPL-MCNC: 1.6 MG/DL (ref 1.6–2.6)
MCH RBC QN AUTO: 28 PG
MCHC RBC AUTO-ENTMCNC: 31.2 MG/DL (ref 33–36)
MCV RBC AUTO: 89.8 FL (ref 80–94)
MONOCYTES # BLD AUTO: 0.69 X10(3)/MCL (ref 0.1–1.3)
MONOCYTES NFR BLD AUTO: 9.3 %
NEUTROPHILS # BLD AUTO: 5.16 X10(3)/MCL (ref 2.1–9.2)
NEUTROPHILS NFR BLD AUTO: 69.4 %
PHOSPHATE SERPL-MCNC: 2.6 MG/DL (ref 2.3–4.7)
PLATELET # BLD AUTO: 241 X10(3)/MCL (ref 130–400)
PMV BLD AUTO: 9.8 FL (ref 7.4–10.4)
POTASSIUM SERPL-SCNC: 3.1 MMOL/L (ref 3.5–5.1)
RBC # BLD AUTO: 3.71 X10(6)/MCL (ref 4.2–5.4)
SODIUM SERPL-SCNC: 140 MMOL/L (ref 132–146)
WBC # SPEC AUTO: 7.4 X10(3)/MCL (ref 4.5–11.5)

## 2023-01-27 PROCEDURE — 94799 UNLISTED PULMONARY SVC/PX: CPT

## 2023-01-27 PROCEDURE — 97530 THERAPEUTIC ACTIVITIES: CPT

## 2023-01-27 PROCEDURE — 94761 N-INVAS EAR/PLS OXIMETRY MLT: CPT

## 2023-01-27 PROCEDURE — 99900035 HC TECH TIME PER 15 MIN (STAT)

## 2023-01-27 PROCEDURE — 25000003 PHARM REV CODE 250: Performed by: SURGERY

## 2023-01-27 PROCEDURE — 63600175 PHARM REV CODE 636 W HCPCS: Performed by: SURGERY

## 2023-01-27 PROCEDURE — 85025 COMPLETE CBC W/AUTO DIFF WBC: CPT | Performed by: SURGERY

## 2023-01-27 PROCEDURE — A4216 STERILE WATER/SALINE, 10 ML: HCPCS | Performed by: SURGERY

## 2023-01-27 PROCEDURE — 80069 RENAL FUNCTION PANEL: CPT | Performed by: INTERNAL MEDICINE

## 2023-01-27 PROCEDURE — 83735 ASSAY OF MAGNESIUM: CPT | Performed by: SURGERY

## 2023-01-27 PROCEDURE — C9113 INJ PANTOPRAZOLE SODIUM, VIA: HCPCS | Performed by: SURGERY

## 2023-01-27 PROCEDURE — 11000001 HC ACUTE MED/SURG PRIVATE ROOM

## 2023-01-27 RX ORDER — POTASSIUM CHLORIDE 20 MEQ/1
60 TABLET, EXTENDED RELEASE ORAL ONCE
Status: COMPLETED | OUTPATIENT
Start: 2023-01-27 | End: 2023-01-27

## 2023-01-27 RX ORDER — POTASSIUM CHLORIDE 7.45 MG/ML
10 INJECTION INTRAVENOUS ONCE
Status: DISCONTINUED | OUTPATIENT
Start: 2023-01-27 | End: 2023-01-27

## 2023-01-27 RX ADMIN — SODIUM CHLORIDE, PRESERVATIVE FREE 10 ML: 5 INJECTION INTRAVENOUS at 06:01

## 2023-01-27 RX ADMIN — OXYCODONE HYDROCHLORIDE 5 MG: 5 TABLET ORAL at 10:01

## 2023-01-27 RX ADMIN — PANTOPRAZOLE SODIUM 40 MG: 40 INJECTION, POWDER, FOR SOLUTION INTRAVENOUS at 09:01

## 2023-01-27 RX ADMIN — POTASSIUM CHLORIDE 60 MEQ: 1500 TABLET, EXTENDED RELEASE ORAL at 12:01

## 2023-01-27 RX ADMIN — PIPERACILLIN AND TAZOBACTAM 4.5 G: 4; .5 INJECTION, POWDER, LYOPHILIZED, FOR SOLUTION INTRAVENOUS; PARENTERAL at 06:01

## 2023-01-27 RX ADMIN — SODIUM CHLORIDE, PRESERVATIVE FREE 10 ML: 5 INJECTION INTRAVENOUS at 12:01

## 2023-01-27 RX ADMIN — ENOXAPARIN SODIUM 30 MG: 30 INJECTION SUBCUTANEOUS at 05:01

## 2023-01-27 RX ADMIN — FAMOTIDINE 20 MG: 10 INJECTION INTRAVENOUS at 09:01

## 2023-01-27 RX ADMIN — OXYCODONE HYDROCHLORIDE 5 MG: 5 TABLET ORAL at 06:01

## 2023-01-27 NOTE — PROGRESS NOTES
"[facility]  Hospital Medicine  Progress Note    Patient Name: Bailey Medina  MRN: [id number]  Patient Class: IP- Inpatient   Admission Date: 1/20/2023  Length of Stay: 7 days  Attending Physician: Sachin De Oliveira MD  Primary Care Provider: Magdaleno Pruett MD        Subjective:     Principal Problem:Colon obstruction        HPI:  89yo WF with h/o CAD, PVD, HTN, megadiverticulum initially presented to ED in [place] c/o Abd Pain with Liquid Diarrhea x2 days. She began having N/V as well and decided to seek care. Workup there with CT A/P suggested SBO/Colon Obstruction and Pneumotosis. She was then transferred here to [facility] in [place] for Surgical evaluation. Pt c/o abd pain controlled with current analgesia, thirst and some nausea. Labs reveal dehydration. WBC nml. NGT to suction. Awaiting GenSurg eval.       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  2.Sigmoid colon resection.  3.Drainage of abscess. 4.Repair of fistula to the bladder.  5.Excision leiomyoma of the uterus. 6.Takedown splenic flexure.  Pt feels "OK" today with abd pain from surgery.     1/22/23  - No new complaints today. Still not feeling well with abd pain when she moves around, otherwise comfortable at rest. Hgb 8.5 from 10.6. She has been somewhat hypOtensive and BUN/Cr slightly increased.  Will transfuse 2u PRBC's which will also give volume expansion and hopefully increased renal perfusion.    1/23/23  - NGT placed. No acute complaints. Cr 1.81 improved from 2.00 yest.    1/24/23  - Feeling better. Cr 1.29 from 1.81.    1/25/23  - No acute complaints. Passing some flatus. Cr 1.07 from 1.29.    1/26/23-Patient states some flatus.  Still has NG tube in place but hopefully surgery will remove soon.      1/27/23-NG tube has been " removed.  She was started on clears and appears to be tolerating at this time.  Will continue with current meds and treatment.      Interval History:     Review of Systems   Constitutional:  Positive for activity change and fatigue.   HENT: Negative.     Eyes: Negative.    Respiratory: Negative.     Cardiovascular: Negative.    Gastrointestinal:  Positive for abdominal pain.   Endocrine: Negative.    Genitourinary: Negative.    Musculoskeletal: Negative.    Skin: Negative.    Allergic/Immunologic: Negative.    Neurological:  Positive for weakness.   Hematological: Negative.    Psychiatric/Behavioral: Negative.     Objective:     Vital Signs (Most Recent):  Temp: 97.8 °F (36.6 °C) (01/27/23 1114)  Pulse: 83 (01/27/23 1114)  Resp: 20 (01/27/23 0951)  BP: 131/86 (01/27/23 1114)  SpO2: 97 % (01/27/23 1114) Vital Signs (24h Range):  Temp:  [97.5 °F (36.4 °C)-97.8 °F (36.6 °C)] 97.8 °F (36.6 °C)  Pulse:  [] 83  Resp:  [18-20] 20  SpO2:  [94 %-98 %] 97 %  BP: (111-143)/(65-86) 131/86     Weight: 78 kg (171 lb 15.3 oz)  Body mass index is 32.49 kg/m².    Intake/Output Summary (Last 24 hours) at 1/27/2023 1341  Last data filed at 1/27/2023 0906  Gross per 24 hour   Intake 1504.58 ml   Output 1465 ml   Net 39.58 ml      Physical Exam  Constitutional:       Appearance: Normal appearance. She is normal weight.   HENT:      Head: Normocephalic and atraumatic.      Nose: Nose normal.      Mouth/Throat:      Mouth: Mucous membranes are moist.      Pharynx: Oropharynx is clear.   Eyes:      Extraocular Movements: Extraocular movements intact.      Conjunctiva/sclera: Conjunctivae normal.      Pupils: Pupils are equal, round, and reactive to light.   Cardiovascular:      Rate and Rhythm: Normal rate and regular rhythm.      Pulses: Normal pulses.      Heart sounds: Normal heart sounds.   Pulmonary:      Effort: Pulmonary effort is normal.      Breath sounds: Normal breath sounds.   Abdominal:      General: Bowel sounds are  decreased.      Palpations: Abdomen is soft.   Musculoskeletal:         General: Normal range of motion.      Cervical back: Normal range of motion and neck supple.   Skin:     General: Skin is warm and dry.      Capillary Refill: Capillary refill takes 2 to 3 seconds.   Neurological:      General: No focal deficit present.      Mental Status: She is alert and oriented to person, place, and time. Mental status is at baseline.   Psychiatric:         Mood and Affect: Mood normal.         Behavior: Behavior normal.         Thought Content: Thought content normal.         Judgment: Judgment normal.       Significant Labs: All pertinent labs within the past 24 hours have been reviewed.  BMP:   Recent Labs   Lab 01/27/23 0233      K 3.1*   CO2 25   BUN 19.0   CREATININE 0.80   CALCIUM 7.9*   MG 1.60     CBC:   Recent Labs   Lab 01/26/23 0343 01/27/23 0233   WBC 8.1 7.4   HGB 11.7* 10.4*   HCT 36.5* 33.3*    241     CMP:   Recent Labs   Lab 01/26/23 0343 01/27/23 0233    140   K 3.3* 3.1*   CO2 23 25   BUN 21.0* 19.0   CREATININE 0.88 0.80   CALCIUM 8.3* 7.9*   ALBUMIN 1.8* 1.7*     Magnesium:   Recent Labs   Lab 01/26/23 0343 01/27/23 0233   MG 1.90 1.60       Significant Imaging: I have reviewed all pertinent imaging results/findings within the past 24 hours.      Assessment/Plan:      * Colon obstruction  Surgery following  S/p resection  NG tube in place  Follow labs  OOB      Hypertension  monitor      CAD (coronary artery disease)  Monitor  Resume home meds once tolerating diet        VTE Risk Mitigation (From admission, onward)         Ordered     enoxaparin injection 30 mg  Daily         01/20/23 2310     IP VTE HIGH RISK PATIENT  Once         01/20/23 2310     Place sequential compression device  Until discontinued         01/20/23 0633     Place RADHIKA hose  Until discontinued         01/20/23 0633              Follow labs  DVT prophylaxis  OOB  Therapy  Resume current meds  Discharge  Planning   KEYANNA:      Code Status: Full Code   Is the patient medically ready for discharge?:     Reason for patient still in hospital (select all that apply): Laboratory test, Treatment and PT / OT recommendations  Discharge Plan A: Home with family                  Kip Hylton MD  Department of Hospital Medicine   [facility]

## 2023-01-27 NOTE — SUBJECTIVE & OBJECTIVE
Interval History:     Review of Systems   Constitutional:  Positive for activity change and fatigue.   HENT: Negative.     Eyes: Negative.    Respiratory: Negative.     Cardiovascular: Negative.    Gastrointestinal:  Positive for abdominal pain.   Endocrine: Negative.    Genitourinary: Negative.    Musculoskeletal: Negative.    Skin: Negative.    Allergic/Immunologic: Negative.    Neurological:  Positive for weakness.   Hematological: Negative.    Psychiatric/Behavioral: Negative.     Objective:     Vital Signs (Most Recent):  Temp: 97.8 °F (36.6 °C) (01/27/23 1114)  Pulse: 83 (01/27/23 1114)  Resp: 20 (01/27/23 0951)  BP: 131/86 (01/27/23 1114)  SpO2: 97 % (01/27/23 1114) Vital Signs (24h Range):  Temp:  [97.5 °F (36.4 °C)-97.8 °F (36.6 °C)] 97.8 °F (36.6 °C)  Pulse:  [] 83  Resp:  [18-20] 20  SpO2:  [94 %-98 %] 97 %  BP: (111-143)/(65-86) 131/86     Weight: 78 kg (171 lb 15.3 oz)  Body mass index is 32.49 kg/m².    Intake/Output Summary (Last 24 hours) at 1/27/2023 1341  Last data filed at 1/27/2023 0906  Gross per 24 hour   Intake 1504.58 ml   Output 1465 ml   Net 39.58 ml      Physical Exam  Constitutional:       Appearance: Normal appearance. She is normal weight.   HENT:      Head: Normocephalic and atraumatic.      Nose: Nose normal.      Mouth/Throat:      Mouth: Mucous membranes are moist.      Pharynx: Oropharynx is clear.   Eyes:      Extraocular Movements: Extraocular movements intact.      Conjunctiva/sclera: Conjunctivae normal.      Pupils: Pupils are equal, round, and reactive to light.   Cardiovascular:      Rate and Rhythm: Normal rate and regular rhythm.      Pulses: Normal pulses.      Heart sounds: Normal heart sounds.   Pulmonary:      Effort: Pulmonary effort is normal.      Breath sounds: Normal breath sounds.   Abdominal:      General: Bowel sounds are decreased.      Palpations: Abdomen is soft.   Musculoskeletal:         General: Normal range of motion.      Cervical back: Normal  range of motion and neck supple.   Skin:     General: Skin is warm and dry.      Capillary Refill: Capillary refill takes 2 to 3 seconds.   Neurological:      General: No focal deficit present.      Mental Status: She is alert and oriented to person, place, and time. Mental status is at baseline.   Psychiatric:         Mood and Affect: Mood normal.         Behavior: Behavior normal.         Thought Content: Thought content normal.         Judgment: Judgment normal.       Significant Labs: All pertinent labs within the past 24 hours have been reviewed.  BMP:   Recent Labs   Lab 01/27/23 0233      K 3.1*   CO2 25   BUN 19.0   CREATININE 0.80   CALCIUM 7.9*   MG 1.60     CBC:   Recent Labs   Lab 01/26/23 0343 01/27/23 0233   WBC 8.1 7.4   HGB 11.7* 10.4*   HCT 36.5* 33.3*    241     CMP:   Recent Labs   Lab 01/26/23 0343 01/27/23 0233    140   K 3.3* 3.1*   CO2 23 25   BUN 21.0* 19.0   CREATININE 0.88 0.80   CALCIUM 8.3* 7.9*   ALBUMIN 1.8* 1.7*     Magnesium:   Recent Labs   Lab 01/26/23 0343 01/27/23  0233   MG 1.90 1.60       Significant Imaging: I have reviewed all pertinent imaging results/findings within the past 24 hours.

## 2023-01-27 NOTE — NURSING
Per Dr Hairston's request:    Ostomy assessment and education for discharge planning    Patient was up in chair AAOx4, however notes being forgetful and has a in home caretaker that will assist with care along with Home Health.    Ostomy WNL, flange and bag intact, appropriate supplies at bedside and patient will take them home.      Attempts x3 to contact Jannette (in home caretaker) unsuccessful.      Attempted to contact sonMichael, left voice mail     1400: Caregiver Jannette at bedside.  Ostomy care education with demonstration returned.

## 2023-01-27 NOTE — PROGRESS NOTES
Patient feels well and better  Started TPN, IVF off  NGT removed  Passing good flatus from stoma     Vs ok  A+ox3, nad  Eomi  Abd soft, inc clean and intact  Stoma is viable and productive of stool/air  Skin no rashes  In good spirits    Labs -   WBC 8, hgb 11.7, Plt 237  Na 140, K 3.3  BUN/CR 21/0.88  Mag 1.9    89F s/p sigmoid colon resection for obstructing stricture and associated colovesical fistula, drainage abscess.  Doing better.  Bowel function returning.  TPN initiated due to prolonged ileus postoperatively.  Plan for:  1) Clears  2) OOB   3) Cont tpn till certain tolerating good diet  4) DC planning - likely home with Home Health

## 2023-01-27 NOTE — PLAN OF CARE
Problem: Adult Inpatient Plan of Care  Goal: Plan of Care Review  Outcome: Ongoing, Progressing  Goal: Patient-Specific Goal (Individualized)  Outcome: Ongoing, Progressing  Goal: Absence of Hospital-Acquired Illness or Injury  Outcome: Ongoing, Progressing  Goal: Optimal Comfort and Wellbeing  Outcome: Ongoing, Progressing  Goal: Readiness for Transition of Care  Outcome: Ongoing, Progressing     Problem: Skin Injury Risk Increased  Goal: Skin Health and Integrity  Outcome: Ongoing, Progressing     Problem: Infection  Goal: Absence of Infection Signs and Symptoms  Outcome: Ongoing, Progressing     Problem: Fall Injury Risk  Goal: Absence of Fall and Fall-Related Injury  Outcome: Ongoing, Progressing     Problem: Fluid and Electrolyte Imbalance (Acute Kidney Injury/Impairment)  Goal: Fluid and Electrolyte Balance  Outcome: Ongoing, Progressing     Problem: Oral Intake Inadequate (Acute Kidney Injury/Impairment)  Goal: Optimal Nutrition Intake  Outcome: Ongoing, Progressing     Problem: Renal Function Impairment (Acute Kidney Injury/Impairment)  Goal: Effective Renal Function  Outcome: Ongoing, Progressing

## 2023-01-27 NOTE — PT/OT/SLP PROGRESS
Physical Therapy Treatment    Patient Name:  Bailey Medina   MRN:  [id number]    Recommendations:     Discharge Recommendations:    Discharge Equipment Recommendations:    Barriers to discharge: None    Assessment:     Bailey Medina is a 89 y.o. female admitted with a medical diagnosis of Colon obstruction.  She presents with the following impairments/functional limitations: weakness, impaired endurance, impaired balance, gait instability, impaired functional mobility, pain.    Pt more lively today, states that she feels better but still complains of abdominal pain. She required mod A for bed mobility, mod A to stand. She tolerated increased gait this morning, ambulating 12' with SW and min A.      Rehab Prognosis: Good; patient would benefit from acute skilled PT services to address these deficits and reach maximum level of function.    Recent Surgery: Procedure(s) (LRB):  LAPAROTOMY, EXPLORATORY (N/A)  CREATION, COLOSTOMY (N/A)  CLOSURE, FISTULA, COLOVESICAL (N/A)  COLECTOMY, SIGMOID (N/A)  DRAINAGE, ABSCESS (N/A)  DISSECTION-SPLENIC FLEXURE (N/A)  EXCISION, LESION, UTERUS (N/A) 7 Days Post-Op    Plan:     During this hospitalization, patient to be seen daily to address the identified rehab impairments via gait training, therapeutic activities, therapeutic exercises and progress toward the following goals:    Plan of Care Expires:  02/21/23    Subjective     Chief Complaint: pain and nausea  Patient/Family Comments/goals: to feel better and get stronger  Pain/Comfort:         Objective:     Communicated with patient prior to session.  Patient found HOB elevated with   upon PT entry to room.     General Precautions: Standard, fall  Orthopedic Precautions:    Braces:    Respiratory Status: Room air     Functional Mobility:  Bed Mobility:     Supine to Sit: moderate assistance and maximal assistance  Transfers:     Sit to Stand:  moderate assistance with rolling walker  Gait: 5' and 12' with RW and min-mod  A  Balance: SBA sitting, mod A standing      AM-PAC 6 CLICK MOBILITY          Treatment & Education:  See above    Patient left up in chair with all lines intact and call button in reach..    GOALS:   Multidisciplinary Problems       Physical Therapy Goals          Problem: Physical Therapy    Goal Priority Disciplines Outcome Goal Variances Interventions   Physical Therapy Goal     PT, PT/OT Ongoing, Progressing     Description: Goals to be met by: 23.     Patient will increase functional independence with mobility by performin. Supine to sit with MInimal Assistance  2. Bed to chair transfer with Minimal Assistance using stand and pivot transfer.                         Time Tracking:     PT Received On: 23  PT Start Time: 0900     PT Stop Time: 920  PT Total Time (min): 20 min     Billable Minutes: Therapeutic Activity 20    Treatment Type: Treatment  PT/PTA: PTA           2023

## 2023-01-27 NOTE — PLAN OF CARE
Informed Dena, Nurse Manager, of need for ostomy teaching to be done today per Dr. Duggan, by wound care nurse.

## 2023-01-27 NOTE — PROGRESS NOTES
Patient feeling well  Tolerating clears - enjoying    Vs ok  A+ox3, nad  Eomi  Abd soft, wound clean, stoma productive of air/stool  CHANCE serosanguinous    Labs -   Wbc 7, hgb 10, plt 241  Na 140, K 3.1  BUN/CR 19/0.8  Mag 1.6, Phos 2.6    89F s/p sigmoid colon resection with colostomy creation and repair colovesical fistula/drainage abscess.  Doing well.  Plan for:  1) Stop iv abx  2) Clears today, if tolerates then soft diet tomorrow  3) Dispo - home with home health, possibly by Monday.  Should go home with leg bag most likely.  Keep CHANCE drain at this time.

## 2023-01-27 NOTE — PLAN OF CARE
Per Dr. Duggan, she wants wound care nurse, Snehal, Supervisor of ICU, if possible or wound care, to thoroughly teach pt and fly ostomy care prior to d/c home tomorrow.  Pt with a  new colostomy.  Order placed and notified nurse.

## 2023-01-28 PROBLEM — E83.42 HYPOMAGNESEMIA: Status: ACTIVE | Noted: 2023-01-28

## 2023-01-28 LAB
ALBUMIN SERPL-MCNC: 1.6 G/DL (ref 3.4–4.8)
BASOPHILS # BLD AUTO: 0.06 X10(3)/MCL (ref 0–0.2)
BASOPHILS NFR BLD AUTO: 0.6 %
BUN SERPL-MCNC: 20 MG/DL (ref 9.8–20.1)
CALCIUM SERPL-MCNC: 7.6 MG/DL (ref 8.4–10.2)
CHLORIDE SERPL-SCNC: 108 MMOL/L (ref 98–111)
CO2 SERPL-SCNC: 21 MMOL/L (ref 23–31)
CREAT SERPL-MCNC: 0.69 MG/DL (ref 0.55–1.02)
EOSINOPHIL # BLD AUTO: 0.45 X10(3)/MCL (ref 0–0.9)
EOSINOPHIL NFR BLD AUTO: 4.6 %
ERYTHROCYTE [DISTWIDTH] IN BLOOD BY AUTOMATED COUNT: 13.9 % (ref 11.5–17)
GFR SERPLBLD CREATININE-BSD FMLA CKD-EPI: >60 MLS/MIN/1.73/M2
GLUCOSE SERPL-MCNC: 116 MG/DL (ref 75–121)
HCT VFR BLD AUTO: 34.2 % (ref 37–47)
HGB BLD-MCNC: 10.5 GM/DL (ref 12–16)
IMM GRANULOCYTES # BLD AUTO: 0.08 X10(3)/MCL (ref 0–0.04)
IMM GRANULOCYTES NFR BLD AUTO: 0.8 %
LYMPHOCYTES # BLD AUTO: 1.6 X10(3)/MCL (ref 0.6–4.6)
LYMPHOCYTES NFR BLD AUTO: 16.5 %
MAGNESIUM SERPL-MCNC: 1.5 MG/DL (ref 1.6–2.6)
MCH RBC QN AUTO: 28.2 PG
MCHC RBC AUTO-ENTMCNC: 30.7 MG/DL (ref 33–36)
MCV RBC AUTO: 91.7 FL (ref 80–94)
MONOCYTES # BLD AUTO: 0.75 X10(3)/MCL (ref 0.1–1.3)
MONOCYTES NFR BLD AUTO: 7.7 %
NEUTROPHILS # BLD AUTO: 6.77 X10(3)/MCL (ref 2.1–9.2)
NEUTROPHILS NFR BLD AUTO: 69.8 %
PHOSPHATE SERPL-MCNC: 2.5 MG/DL (ref 2.3–4.7)
PLATELET # BLD AUTO: 232 X10(3)/MCL (ref 130–400)
PMV BLD AUTO: 9.9 FL (ref 7.4–10.4)
POTASSIUM SERPL-SCNC: 4.1 MMOL/L (ref 3.5–5.1)
RBC # BLD AUTO: 3.73 X10(6)/MCL (ref 4.2–5.4)
SODIUM SERPL-SCNC: 137 MMOL/L (ref 132–146)
WBC # SPEC AUTO: 9.7 X10(3)/MCL (ref 4.5–11.5)

## 2023-01-28 PROCEDURE — 83735 ASSAY OF MAGNESIUM: CPT | Performed by: SURGERY

## 2023-01-28 PROCEDURE — C9113 INJ PANTOPRAZOLE SODIUM, VIA: HCPCS | Performed by: SURGERY

## 2023-01-28 PROCEDURE — 94761 N-INVAS EAR/PLS OXIMETRY MLT: CPT

## 2023-01-28 PROCEDURE — 25000003 PHARM REV CODE 250: Performed by: SURGERY

## 2023-01-28 PROCEDURE — A4216 STERILE WATER/SALINE, 10 ML: HCPCS | Performed by: SURGERY

## 2023-01-28 PROCEDURE — 99900035 HC TECH TIME PER 15 MIN (STAT)

## 2023-01-28 PROCEDURE — 97530 THERAPEUTIC ACTIVITIES: CPT

## 2023-01-28 PROCEDURE — 11000001 HC ACUTE MED/SURG PRIVATE ROOM

## 2023-01-28 PROCEDURE — 63600175 PHARM REV CODE 636 W HCPCS: Performed by: INTERNAL MEDICINE

## 2023-01-28 PROCEDURE — 80069 RENAL FUNCTION PANEL: CPT | Performed by: INTERNAL MEDICINE

## 2023-01-28 PROCEDURE — 94799 UNLISTED PULMONARY SVC/PX: CPT

## 2023-01-28 PROCEDURE — 63600175 PHARM REV CODE 636 W HCPCS: Performed by: SURGERY

## 2023-01-28 PROCEDURE — 85025 COMPLETE CBC W/AUTO DIFF WBC: CPT | Performed by: SURGERY

## 2023-01-28 RX ORDER — MAGNESIUM SULFATE HEPTAHYDRATE 40 MG/ML
4 INJECTION, SOLUTION INTRAVENOUS ONCE
Status: COMPLETED | OUTPATIENT
Start: 2023-01-28 | End: 2023-01-28

## 2023-01-28 RX ADMIN — PANTOPRAZOLE SODIUM 40 MG: 40 INJECTION, POWDER, FOR SOLUTION INTRAVENOUS at 08:01

## 2023-01-28 RX ADMIN — SODIUM CHLORIDE, PRESERVATIVE FREE 10 ML: 5 INJECTION INTRAVENOUS at 12:01

## 2023-01-28 RX ADMIN — FAMOTIDINE 20 MG: 10 INJECTION INTRAVENOUS at 09:01

## 2023-01-28 RX ADMIN — SODIUM CHLORIDE, PRESERVATIVE FREE 10 ML: 5 INJECTION INTRAVENOUS at 11:01

## 2023-01-28 RX ADMIN — ENOXAPARIN SODIUM 30 MG: 30 INJECTION SUBCUTANEOUS at 04:01

## 2023-01-28 RX ADMIN — OXYCODONE HYDROCHLORIDE 5 MG: 5 TABLET ORAL at 08:01

## 2023-01-28 RX ADMIN — MAGNESIUM SULFATE HEPTAHYDRATE 4 G: 40 INJECTION, SOLUTION INTRAVENOUS at 11:01

## 2023-01-28 RX ADMIN — PANTOPRAZOLE SODIUM 40 MG: 40 INJECTION, POWDER, FOR SOLUTION INTRAVENOUS at 09:01

## 2023-01-28 RX ADMIN — SODIUM CHLORIDE, PRESERVATIVE FREE 10 ML: 5 INJECTION INTRAVENOUS at 05:01

## 2023-01-28 NOTE — PLAN OF CARE
Problem: Adult Inpatient Plan of Care  Goal: Plan of Care Review  Outcome: Ongoing, Progressing  Goal: Patient-Specific Goal (Individualized)  Outcome: Ongoing, Progressing  Goal: Absence of Hospital-Acquired Illness or Injury  Outcome: Ongoing, Progressing  Goal: Optimal Comfort and Wellbeing  Outcome: Ongoing, Progressing  Goal: Readiness for Transition of Care  Outcome: Ongoing, Progressing     Problem: Skin Injury Risk Increased  Goal: Skin Health and Integrity  Outcome: Ongoing, Progressing     Problem: Infection  Goal: Absence of Infection Signs and Symptoms  Outcome: Ongoing, Progressing     Problem: Fall Injury Risk  Goal: Absence of Fall and Fall-Related Injury  Outcome: Ongoing, Progressing     Problem: Fluid and Electrolyte Imbalance (Acute Kidney Injury/Impairment)  Goal: Fluid and Electrolyte Balance  Outcome: Ongoing, Progressing     Problem: Oral Intake Inadequate (Acute Kidney Injury/Impairment)  Goal: Optimal Nutrition Intake  Outcome: Ongoing, Progressing     Problem: Renal Function Impairment (Acute Kidney Injury/Impairment)  Goal: Effective Renal Function  Outcome: Ongoing, Progressing     Problem: Balance Impairment (Functional Deficit)  Goal: Improved Balance and Postural Control  Outcome: Ongoing, Progressing     Problem: Muscle Strength Impairment (Functional Deficit)  Goal: Improved Muscle Strength  Outcome: Ongoing, Progressing     Problem: Balance Impairment (Functional Deficit)  Goal: Improved Balance and Postural Control  Outcome: Ongoing, Progressing     Problem: Muscle Strength Impairment (Functional Deficit)  Goal: Improved Muscle Strength  Outcome: Ongoing, Progressing     Problem: Range of Motion Impairment (Functional Deficit)  Goal: Optimal Range of Motion  Outcome: Ongoing, Progressing

## 2023-01-28 NOTE — PROGRESS NOTES
"[facility]  Hospital Medicine  Progress Note    Patient Name: Bailey Medina  MRN: [id number]  Patient Class: IP- Inpatient   Admission Date: 1/20/2023  Length of Stay: 8 days  Attending Physician: Sachin De Oliveira MD  Primary Care Provider: Magdaleno Pruett MD        Subjective:     Principal Problem:Colon obstruction        HPI:  89yo WF with h/o CAD, PVD, HTN, megadiverticulum initially presented to ED in [place] c/o Abd Pain with Liquid Diarrhea x2 days. She began having N/V as well and decided to seek care. Workup there with CT A/P suggested SBO/Colon Obstruction and Pneumotosis. She was then transferred here to [facility] in [place] for Surgical evaluation. Pt c/o abd pain controlled with current analgesia, thirst and some nausea. Labs reveal dehydration. WBC nml. NGT to suction. Awaiting GenSurg eval.       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  2.Sigmoid colon resection.  3.Drainage of abscess. 4.Repair of fistula to the bladder.  5.Excision leiomyoma of the uterus. 6.Takedown splenic flexure.  Pt feels "OK" today with abd pain from surgery.     1/22/23  - No new complaints today. Still not feeling well with abd pain when she moves around, otherwise comfortable at rest. Hgb 8.5 from 10.6. She has been somewhat hypOtensive and BUN/Cr slightly increased.  Will transfuse 2u PRBC's which will also give volume expansion and hopefully increased renal perfusion.    1/23/23  - NGT placed. No acute complaints. Cr 1.81 improved from 2.00 yest.    1/24/23  - Feeling better. Cr 1.29 from 1.81.    1/25/23  - No acute complaints. Passing some flatus. Cr 1.07 from 1.29.    1/26/23-Patient states some flatus.  Still has NG tube in place but hopefully surgery will remove soon.      1/27/23-NG tube has been " removed.  She was started on clears and appears to be tolerating at this time.  Will continue with current meds and treatment.    1/28/23-Patient is doing well at this time.  She is tolerating a clear liquid diet.  Her Mag is low which will be replaced.        Interval History:     Review of Systems   Constitutional:  Positive for activity change.   HENT: Negative.     Eyes: Negative.    Respiratory: Negative.     Cardiovascular: Negative.    Gastrointestinal:  Positive for abdominal pain.   Endocrine: Negative.    Genitourinary: Negative.    Musculoskeletal: Negative.    Skin: Negative.    Allergic/Immunologic: Negative.    Neurological: Negative.    Hematological: Negative.    Psychiatric/Behavioral: Negative.     Objective:     Vital Signs (Most Recent):  Temp: 98.9 °F (37.2 °C) (01/28/23 1213)  Pulse: 83 (01/28/23 1213)  Resp: 20 (01/28/23 0814)  BP: 136/81 (01/28/23 1213)  SpO2: 99 % (01/28/23 1213) Vital Signs (24h Range):  Temp:  [97.4 °F (36.3 °C)-98.9 °F (37.2 °C)] 98.9 °F (37.2 °C)  Pulse:  [81-84] 83  Resp:  [17-20] 20  SpO2:  [77 %-99 %] 99 %  BP: (123-136)/(69-83) 136/81     Weight: 78 kg (171 lb 15.3 oz)  Body mass index is 32.49 kg/m².    Intake/Output Summary (Last 24 hours) at 1/28/2023 1231  Last data filed at 1/28/2023 0825  Gross per 24 hour   Intake 1387.42 ml   Output 642 ml   Net 745.42 ml      Physical Exam  Constitutional:       Appearance: Normal appearance. She is obese.   HENT:      Head: Normocephalic and atraumatic.      Nose: Nose normal.      Mouth/Throat:      Mouth: Mucous membranes are moist.      Pharynx: Oropharynx is clear.   Eyes:      Extraocular Movements: Extraocular movements intact.      Conjunctiva/sclera: Conjunctivae normal.      Pupils: Pupils are equal, round, and reactive to light.   Cardiovascular:      Rate and Rhythm: Normal rate and regular rhythm.      Pulses: Normal pulses.      Heart sounds: Normal heart sounds.   Pulmonary:      Effort: Pulmonary effort is  normal.      Breath sounds: Normal breath sounds.   Abdominal:      General: Bowel sounds are normal.      Palpations: Abdomen is soft.      Tenderness: There is abdominal tenderness.   Musculoskeletal:         General: Normal range of motion.      Cervical back: Normal range of motion and neck supple.   Skin:     General: Skin is warm and dry.      Capillary Refill: Capillary refill takes 2 to 3 seconds.   Neurological:      General: No focal deficit present.      Mental Status: She is alert and oriented to person, place, and time. Mental status is at baseline.   Psychiatric:         Mood and Affect: Mood normal.         Behavior: Behavior normal.         Thought Content: Thought content normal.         Judgment: Judgment normal.       Significant Labs: All pertinent labs within the past 24 hours have been reviewed.  BMP:   Recent Labs   Lab 01/28/23  0157      K 4.1   CO2 21*   BUN 20.0   CREATININE 0.69   CALCIUM 7.6*   MG 1.50*     CBC:   Recent Labs   Lab 01/27/23 0233 01/28/23 0157   WBC 7.4 9.7   HGB 10.4* 10.5*   HCT 33.3* 34.2*    232     CMP:   Recent Labs   Lab 01/27/23 0233 01/28/23  0157    137   K 3.1* 4.1   CO2 25 21*   BUN 19.0 20.0   CREATININE 0.80 0.69   CALCIUM 7.9* 7.6*   ALBUMIN 1.7* 1.6*     Magnesium:   Recent Labs   Lab 01/27/23 0233 01/28/23  0157   MG 1.60 1.50*       Significant Imaging: I have reviewed all pertinent imaging results/findings within the past 24 hours.      Assessment/Plan:      * Colon obstruction  Surgery following  S/p resection  NG tube in place  Follow labs  OOB      Hypomagnesemia  replace Mag      Hypertension  monitor      CAD (coronary artery disease)  Monitor  Resume home meds once tolerating diet        VTE Risk Mitigation (From admission, onward)         Ordered     enoxaparin injection 30 mg  Daily         01/20/23 2310     IP VTE HIGH RISK PATIENT  Once         01/20/23 2310     Place sequential compression device  Until discontinued          01/20/23 0633     Place RADHIKA hose  Until discontinued         01/20/23 0633              Follow labs  DVt prophylaxis  Ambulate  OOB  Diet as per surgery  replace Mag IV  Discharge Planning   KEYANNA:      Code Status: Full Code   Is the patient medically ready for discharge?:     Reason for patient still in hospital (select all that apply): Laboratory test, Treatment and PT / OT recommendations  Discharge Plan A: Home with family                  Kip Hylton MD  Department of Hospital Medicine   [facility]

## 2023-01-28 NOTE — PT/OT/SLP PROGRESS
Physical Therapy Treatment    Patient Name:  Bailey Medina   MRN:  [id number]    Recommendations:     Discharge Recommendations:   recommend further therapy in a facility in order to maximize functional indep  Discharge Equipment Recommendations:    Barriers to discharge: Decreased caregiver support and increased assistance needed.    Assessment:     Bailey Medina is a 89 y.o. female admitted with a medical diagnosis of Colon obstruction.  She presents with the following impairments/functional limitations: weakness, impaired endurance, impaired balance, gait instability, impaired functional mobility, pain .    Patient participated in therapy, but limited in ability to stand and maintain standing today, several sit to stand attempts with Max A x 2 with RW, standing only briefly with poor balance, sitting EOB x 10 minutes to complete balance and LE ROM. Seated forward bends to initiate sit to stand.    Rehab Prognosis: Good; patient would benefit from acute skilled PT services to address these deficits and reach maximum level of function.    Recent Surgery: Procedure(s) (LRB):  LAPAROTOMY, EXPLORATORY (N/A)  CREATION, COLOSTOMY (N/A)  CLOSURE, FISTULA, COLOVESICAL (N/A)  COLECTOMY, SIGMOID (N/A)  DRAINAGE, ABSCESS (N/A)  DISSECTION-SPLENIC FLEXURE (N/A)  EXCISION, LESION, UTERUS (N/A) 8 Days Post-Op    Plan:     During this hospitalization, patient to be seen daily to address the identified rehab impairments via gait training, therapeutic activities, therapeutic exercises and progress toward the following goals:    Plan of Care Expires:  02/21/23    Subjective     Chief Complaint: fatigue and pain despite pt pre-medicated  Patient/Family Comments/goals: to regain strength.  Pain/Comfort:         Objective:     Communicated with nurse and sitter prior to session.  Patient found HOB elevated with   upon PT entry to room.     General Precautions: Standard, fall  Orthopedic Precautions:    Braces:    Respiratory  Status: Room air     Functional Mobility:  Bed Mobility:     Supine to Sit: maximal assistance  Sit to Supine: maximal assistance  Transfers:     Sit to Stand:  moderate assistance, maximal assistance, and of 2 persons with rolling walker  Balance: sitting EOB : Poor+/Fair -, standing : Poor with UE support      AM-PAC 6 CLICK MOBILITY          Treatment & Education:  As above , reviewed safety    Patient left HOB elevated with all lines intact, call button in reach, and sitter present..    GOALS:   Multidisciplinary Problems       Physical Therapy Goals          Problem: Physical Therapy    Goal Priority Disciplines Outcome Goal Variances Interventions   Physical Therapy Goal     PT, PT/OT Ongoing, Progressing     Description: Goals to be met by: 23.     Patient will increase functional independence with mobility by performin. Supine to sit with MInimal Assistance  2. Bed to chair transfer with Minimal Assistance using stand and pivot transfer.                         Time Tracking:     PT Received On:  23  PT Start Time:     0900  PT Stop Time:  09  PT Total Time (min):   20    Billable Minutes: Therapeutic Activity 2023

## 2023-01-28 NOTE — SUBJECTIVE & OBJECTIVE
Interval History:     Review of Systems   Constitutional:  Positive for activity change.   HENT: Negative.     Eyes: Negative.    Respiratory: Negative.     Cardiovascular: Negative.    Gastrointestinal:  Positive for abdominal pain.   Endocrine: Negative.    Genitourinary: Negative.    Musculoskeletal: Negative.    Skin: Negative.    Allergic/Immunologic: Negative.    Neurological: Negative.    Hematological: Negative.    Psychiatric/Behavioral: Negative.     Objective:     Vital Signs (Most Recent):  Temp: 98.9 °F (37.2 °C) (01/28/23 1213)  Pulse: 83 (01/28/23 1213)  Resp: 20 (01/28/23 0814)  BP: 136/81 (01/28/23 1213)  SpO2: 99 % (01/28/23 1213) Vital Signs (24h Range):  Temp:  [97.4 °F (36.3 °C)-98.9 °F (37.2 °C)] 98.9 °F (37.2 °C)  Pulse:  [81-84] 83  Resp:  [17-20] 20  SpO2:  [77 %-99 %] 99 %  BP: (123-136)/(69-83) 136/81     Weight: 78 kg (171 lb 15.3 oz)  Body mass index is 32.49 kg/m².    Intake/Output Summary (Last 24 hours) at 1/28/2023 1231  Last data filed at 1/28/2023 0825  Gross per 24 hour   Intake 1387.42 ml   Output 642 ml   Net 745.42 ml      Physical Exam  Constitutional:       Appearance: Normal appearance. She is obese.   HENT:      Head: Normocephalic and atraumatic.      Nose: Nose normal.      Mouth/Throat:      Mouth: Mucous membranes are moist.      Pharynx: Oropharynx is clear.   Eyes:      Extraocular Movements: Extraocular movements intact.      Conjunctiva/sclera: Conjunctivae normal.      Pupils: Pupils are equal, round, and reactive to light.   Cardiovascular:      Rate and Rhythm: Normal rate and regular rhythm.      Pulses: Normal pulses.      Heart sounds: Normal heart sounds.   Pulmonary:      Effort: Pulmonary effort is normal.      Breath sounds: Normal breath sounds.   Abdominal:      General: Bowel sounds are normal.      Palpations: Abdomen is soft.      Tenderness: There is abdominal tenderness.   Musculoskeletal:         General: Normal range of motion.      Cervical back:  Normal range of motion and neck supple.   Skin:     General: Skin is warm and dry.      Capillary Refill: Capillary refill takes 2 to 3 seconds.   Neurological:      General: No focal deficit present.      Mental Status: She is alert and oriented to person, place, and time. Mental status is at baseline.   Psychiatric:         Mood and Affect: Mood normal.         Behavior: Behavior normal.         Thought Content: Thought content normal.         Judgment: Judgment normal.       Significant Labs: All pertinent labs within the past 24 hours have been reviewed.  BMP:   Recent Labs   Lab 01/28/23 0157      K 4.1   CO2 21*   BUN 20.0   CREATININE 0.69   CALCIUM 7.6*   MG 1.50*     CBC:   Recent Labs   Lab 01/27/23 0233 01/28/23 0157   WBC 7.4 9.7   HGB 10.4* 10.5*   HCT 33.3* 34.2*    232     CMP:   Recent Labs   Lab 01/27/23 0233 01/28/23 0157    137   K 3.1* 4.1   CO2 25 21*   BUN 19.0 20.0   CREATININE 0.80 0.69   CALCIUM 7.9* 7.6*   ALBUMIN 1.7* 1.6*     Magnesium:   Recent Labs   Lab 01/27/23 0233 01/28/23 0157   MG 1.60 1.50*       Significant Imaging: I have reviewed all pertinent imaging results/findings within the past 24 hours.

## 2023-01-29 LAB
ALBUMIN SERPL-MCNC: 1.5 G/DL (ref 3.4–4.8)
BASOPHILS # BLD AUTO: 0.05 X10(3)/MCL (ref 0–0.2)
BASOPHILS NFR BLD AUTO: 0.5 %
BUN SERPL-MCNC: 23 MG/DL (ref 9.8–20.1)
CALCIUM SERPL-MCNC: 7.7 MG/DL (ref 8.4–10.2)
CHLORIDE SERPL-SCNC: 104 MMOL/L (ref 98–111)
CO2 SERPL-SCNC: 24 MMOL/L (ref 23–31)
CREAT SERPL-MCNC: 0.65 MG/DL (ref 0.55–1.02)
EOSINOPHIL # BLD AUTO: 0.41 X10(3)/MCL (ref 0–0.9)
EOSINOPHIL NFR BLD AUTO: 4.2 %
ERYTHROCYTE [DISTWIDTH] IN BLOOD BY AUTOMATED COUNT: 13.6 % (ref 11.5–17)
GFR SERPLBLD CREATININE-BSD FMLA CKD-EPI: >60 MLS/MIN/1.73/M2
GLUCOSE SERPL-MCNC: 121 MG/DL (ref 75–121)
HCT VFR BLD AUTO: 33.1 % (ref 37–47)
HGB BLD-MCNC: 10.6 GM/DL (ref 12–16)
IMM GRANULOCYTES # BLD AUTO: 0.05 X10(3)/MCL (ref 0–0.04)
IMM GRANULOCYTES NFR BLD AUTO: 0.5 %
LYMPHOCYTES # BLD AUTO: 1.32 X10(3)/MCL (ref 0.6–4.6)
LYMPHOCYTES NFR BLD AUTO: 13.5 %
MAGNESIUM SERPL-MCNC: 2 MG/DL (ref 1.6–2.6)
MCH RBC QN AUTO: 28.7 PG
MCHC RBC AUTO-ENTMCNC: 32 MG/DL (ref 33–36)
MCV RBC AUTO: 89.7 FL (ref 80–94)
MONOCYTES # BLD AUTO: 0.61 X10(3)/MCL (ref 0.1–1.3)
MONOCYTES NFR BLD AUTO: 6.2 %
NEUTROPHILS # BLD AUTO: 7.34 X10(3)/MCL (ref 2.1–9.2)
NEUTROPHILS NFR BLD AUTO: 75.1 %
PHOSPHATE SERPL-MCNC: 2.8 MG/DL (ref 2.3–4.7)
PLATELET # BLD AUTO: 249 X10(3)/MCL (ref 130–400)
PMV BLD AUTO: 9.7 FL (ref 7.4–10.4)
POTASSIUM SERPL-SCNC: 4 MMOL/L (ref 3.5–5.1)
RBC # BLD AUTO: 3.69 X10(6)/MCL (ref 4.2–5.4)
SODIUM SERPL-SCNC: 134 MMOL/L (ref 132–146)
WBC # SPEC AUTO: 9.8 X10(3)/MCL (ref 4.5–11.5)

## 2023-01-29 PROCEDURE — 97530 THERAPEUTIC ACTIVITIES: CPT

## 2023-01-29 PROCEDURE — 11000001 HC ACUTE MED/SURG PRIVATE ROOM

## 2023-01-29 PROCEDURE — 80069 RENAL FUNCTION PANEL: CPT | Performed by: INTERNAL MEDICINE

## 2023-01-29 PROCEDURE — 94799 UNLISTED PULMONARY SVC/PX: CPT

## 2023-01-29 PROCEDURE — 25000003 PHARM REV CODE 250: Performed by: SURGERY

## 2023-01-29 PROCEDURE — 63600175 PHARM REV CODE 636 W HCPCS: Performed by: SURGERY

## 2023-01-29 PROCEDURE — 83735 ASSAY OF MAGNESIUM: CPT | Performed by: SURGERY

## 2023-01-29 PROCEDURE — 85025 COMPLETE CBC W/AUTO DIFF WBC: CPT | Performed by: SURGERY

## 2023-01-29 PROCEDURE — A4216 STERILE WATER/SALINE, 10 ML: HCPCS | Performed by: SURGERY

## 2023-01-29 PROCEDURE — 94761 N-INVAS EAR/PLS OXIMETRY MLT: CPT

## 2023-01-29 PROCEDURE — C9113 INJ PANTOPRAZOLE SODIUM, VIA: HCPCS | Performed by: SURGERY

## 2023-01-29 PROCEDURE — 27000207 HC ISOLATION

## 2023-01-29 RX ORDER — FAMOTIDINE 20 MG/1
20 TABLET, FILM COATED ORAL DAILY
Status: DISCONTINUED | OUTPATIENT
Start: 2023-01-30 | End: 2023-01-30 | Stop reason: HOSPADM

## 2023-01-29 RX ORDER — PANTOPRAZOLE SODIUM 40 MG/1
40 TABLET, DELAYED RELEASE ORAL DAILY
Status: DISCONTINUED | OUTPATIENT
Start: 2023-01-30 | End: 2023-01-30 | Stop reason: HOSPADM

## 2023-01-29 RX ADMIN — LEUCINE, PHENYLALANINE, LYSINE, METHIONINE, ISOLEUCINE, VALINE, HISTIDINE, THREONINE, TRYPTOPHAN, ALANINE, GLYCINE, ARGININE, PROLINE, SERINE, TYROSINE, SODIUM ACETATE, DIBASIC POTASSIUM PHOSPHATE, MAGNESIUM CHLORIDE, SODIUM CHLORIDE, CALCIUM CHLORIDE, DEXTROSE 2000 ML
365; 280; 290; 200; 300; 290; 240; 210; 90; 1035; 515; 575; 340; 250; 20; 340; 261; 51; 59; 33; 20 INJECTION INTRAVENOUS at 09:01

## 2023-01-29 RX ADMIN — SODIUM CHLORIDE, PRESERVATIVE FREE 10 ML: 5 INJECTION INTRAVENOUS at 06:01

## 2023-01-29 RX ADMIN — SODIUM CHLORIDE, PRESERVATIVE FREE 10 ML: 5 INJECTION INTRAVENOUS at 12:01

## 2023-01-29 RX ADMIN — OXYCODONE HYDROCHLORIDE 5 MG: 5 TABLET ORAL at 10:01

## 2023-01-29 RX ADMIN — FAMOTIDINE 20 MG: 10 INJECTION INTRAVENOUS at 08:01

## 2023-01-29 RX ADMIN — PANTOPRAZOLE SODIUM 40 MG: 40 INJECTION, POWDER, FOR SOLUTION INTRAVENOUS at 08:01

## 2023-01-29 RX ADMIN — ENOXAPARIN SODIUM 30 MG: 30 INJECTION SUBCUTANEOUS at 05:01

## 2023-01-29 NOTE — PT/OT/SLP PROGRESS
Physical Therapy Treatment    Patient Name:  Bailey Medina   MRN:  [id number]    Recommendations:     Discharge Recommendations:   recommend further therapy in a facility in order to maximize functional indep  Discharge Equipment Recommendations:    Barriers to discharge: Decreased caregiver support and increased assistance needed.    Assessment:     Bailey Medina is a 89 y.o. female admitted with a medical diagnosis of Colon obstruction.  She presents with the following impairments/functional limitations: weakness, impaired endurance, impaired balance, gait instability, impaired functional mobility, pain .    Patient participated in therapy, but limited in ability to stand and maintain standing again today, several sit to stand attempts with Max A x 2 with RW, standing only briefly or barely clearing the bed with poor balance, sitting EOB x 10 minutes to complete balance and LE ROM. Seated forward bends to initiate sit to stand. Patient having increased difficulty with obtaining standing posture, therefore unsafe to transfer to BS chair.    Rehab Prognosis: Good; patient would benefit from acute skilled PT services to address these deficits and reach maximum level of function.    Recent Surgery: Procedure(s) (LRB):  LAPAROTOMY, EXPLORATORY (N/A)  CREATION, COLOSTOMY (N/A)  CLOSURE, FISTULA, COLOVESICAL (N/A)  COLECTOMY, SIGMOID (N/A)  DRAINAGE, ABSCESS (N/A)  DISSECTION-SPLENIC FLEXURE (N/A)  EXCISION, LESION, UTERUS (N/A) 9 Days Post-Op    Plan:     During this hospitalization, patient to be seen daily to address the identified rehab impairments via gait training, therapeutic activities, therapeutic exercises and progress toward the following goals:    Plan of Care Expires:  02/21/23    Subjective     Chief Complaint: fatigue and pain despite pt pre-medicated  Patient/Family Comments/goals: to regain strength.  Pain/Comfort:         Objective:     Communicated with nurse and sitter prior to session.  Patient  found HOB elevated with   upon PT entry to room.     General Precautions: Standard, fall  Orthopedic Precautions:    Braces:    Respiratory Status: Room air     Functional Mobility:  Bed Mobility:     Supine to Sit: maximal assistance  Sit to Supine: maximal assistance  Transfers:     Sit to Stand:  moderate assistance, maximal assistance, and of 2 persons with rolling walker  Balance: sitting EOB : Poor+/Fair -, standing : Poor with UE support      AM-PAC 6 CLICK MOBILITY          Treatment & Education:  As above , reviewed safety    Patient left HOB elevated with all lines intact, call button in reach, and nurse present..    GOALS:   Multidisciplinary Problems       Physical Therapy Goals          Problem: Physical Therapy    Goal Priority Disciplines Outcome Goal Variances Interventions   Physical Therapy Goal     PT, PT/OT Ongoing, Progressing     Description: Goals to be met by: 23.     Patient will increase functional independence with mobility by performin. Supine to sit with MInimal Assistance  2. Bed to chair transfer with Minimal Assistance using stand and pivot transfer.                         Time Tracking:     PT Received On:  23  PT Start Time:     0835  PT Stop Time:  0855  PT Total Time (min):   20    Billable Minutes: Therapeutic Activity 2023

## 2023-01-30 VITALS
TEMPERATURE: 98 F | HEIGHT: 61 IN | WEIGHT: 186.31 LBS | RESPIRATION RATE: 18 BRPM | SYSTOLIC BLOOD PRESSURE: 129 MMHG | DIASTOLIC BLOOD PRESSURE: 91 MMHG | OXYGEN SATURATION: 97 % | BODY MASS INDEX: 35.18 KG/M2 | HEART RATE: 90 BPM

## 2023-01-30 LAB
ALBUMIN SERPL-MCNC: 1.5 G/DL (ref 3.4–4.8)
BASOPHILS # BLD AUTO: 0.06 X10(3)/MCL (ref 0–0.2)
BASOPHILS NFR BLD AUTO: 0.6 %
BUN SERPL-MCNC: 22 MG/DL (ref 9.8–20.1)
CALCIUM SERPL-MCNC: 7.9 MG/DL (ref 8.4–10.2)
CHLORIDE SERPL-SCNC: 102 MMOL/L (ref 98–111)
CO2 SERPL-SCNC: 23 MMOL/L (ref 23–31)
CREAT SERPL-MCNC: 0.68 MG/DL (ref 0.55–1.02)
EOSINOPHIL # BLD AUTO: 0.26 X10(3)/MCL (ref 0–0.9)
EOSINOPHIL NFR BLD AUTO: 2.7 %
ERYTHROCYTE [DISTWIDTH] IN BLOOD BY AUTOMATED COUNT: 13.6 % (ref 11.5–17)
GFR SERPLBLD CREATININE-BSD FMLA CKD-EPI: >60 MLS/MIN/1.73/M2
GLUCOSE SERPL-MCNC: 87 MG/DL (ref 75–121)
HCT VFR BLD AUTO: 32.9 % (ref 37–47)
HGB BLD-MCNC: 10.6 GM/DL (ref 12–16)
IMM GRANULOCYTES # BLD AUTO: 0.04 X10(3)/MCL (ref 0–0.04)
IMM GRANULOCYTES NFR BLD AUTO: 0.4 %
LYMPHOCYTES # BLD AUTO: 1.34 X10(3)/MCL (ref 0.6–4.6)
LYMPHOCYTES NFR BLD AUTO: 14 %
MAGNESIUM SERPL-MCNC: 1.8 MG/DL (ref 1.6–2.6)
MCH RBC QN AUTO: 28.9 PG
MCHC RBC AUTO-ENTMCNC: 32.2 MG/DL (ref 33–36)
MCV RBC AUTO: 89.6 FL (ref 80–94)
MONOCYTES # BLD AUTO: 0.58 X10(3)/MCL (ref 0.1–1.3)
MONOCYTES NFR BLD AUTO: 6 %
NEUTROPHILS # BLD AUTO: 7.32 X10(3)/MCL (ref 2.1–9.2)
NEUTROPHILS NFR BLD AUTO: 76.3 %
PHOSPHATE SERPL-MCNC: 2.8 MG/DL (ref 2.3–4.7)
PLATELET # BLD AUTO: 263 X10(3)/MCL (ref 130–400)
PMV BLD AUTO: 9.7 FL (ref 7.4–10.4)
POTASSIUM SERPL-SCNC: 4.4 MMOL/L (ref 3.5–5.1)
RBC # BLD AUTO: 3.67 X10(6)/MCL (ref 4.2–5.4)
SODIUM SERPL-SCNC: 133 MMOL/L (ref 132–146)
WBC # SPEC AUTO: 9.6 X10(3)/MCL (ref 4.5–11.5)

## 2023-01-30 PROCEDURE — 25000003 PHARM REV CODE 250: Performed by: SURGERY

## 2023-01-30 PROCEDURE — 94799 UNLISTED PULMONARY SVC/PX: CPT

## 2023-01-30 PROCEDURE — 85025 COMPLETE CBC W/AUTO DIFF WBC: CPT | Performed by: SURGERY

## 2023-01-30 PROCEDURE — 80069 RENAL FUNCTION PANEL: CPT | Performed by: INTERNAL MEDICINE

## 2023-01-30 PROCEDURE — 99900035 HC TECH TIME PER 15 MIN (STAT)

## 2023-01-30 PROCEDURE — 94761 N-INVAS EAR/PLS OXIMETRY MLT: CPT

## 2023-01-30 PROCEDURE — 83735 ASSAY OF MAGNESIUM: CPT | Performed by: SURGERY

## 2023-01-30 RX ADMIN — PANTOPRAZOLE SODIUM 40 MG: 40 TABLET, DELAYED RELEASE ORAL at 08:01

## 2023-01-30 RX ADMIN — FAMOTIDINE 20 MG: 20 TABLET, FILM COATED ORAL at 08:01

## 2023-01-30 RX ADMIN — OXYCODONE HYDROCHLORIDE 5 MG: 5 TABLET ORAL at 08:01

## 2023-01-30 NOTE — PROVIDER PROGRESS NOTES - EMERGENCY DEPT.
01/28/2023     89F s/p sigmoid colon resection with colostomy creation and repair colovesical fistula/drainage abscess.  Doing well.  Vital signs are stable afebrile wounds are clean and dry she is progressing appropriately antibiotics have been discontinued she is going to have a Blum taken out CHANCE drain probably pulled in the next day or so.  She is been putting out a lot of clear serous material.  I will check on her again in the morning.      01/29/2023    Patient is a 89-year-old  female status post sigmoid colon resection with colostomy for draining colovesical fistula and associated abscess.  Patient's vital signs are stable she is afebrile she is doing well progressing appropriately did not feel like getting out of bed today.  She will be able to go home with nursing assistance tomorrow morning.  Blum catheter has been discontinued CHANCE drain has also been discontinued.  IV fluids have been discontinued she is on regular diet she had her PICC line removed today.

## 2023-01-30 NOTE — PROVIDER PROGRESS NOTES - EMERGENCY DEPT.
01/28/2023     89F s/p sigmoid colon resection with colostomy creation and repair colovesical fistula/drainage abscess.  Doing well.  Vital signs are stable afebrile wounds are clean and dry she is progressing appropriately antibiotics have been discontinued she is going to have a Blum taken out CHANCE drain probably pulled in the next day or so.  She is been putting out a lot of clear serous material.  I will check on her again in the morning.

## 2023-01-30 NOTE — DISCHARGE SUMMARY
[facility]  Medical Surgical Unit  General Surgery  Discharge Summary      Patient Name: Bailey Medina  MRN: [id number]  Admission Date: 1/20/2023  Hospital Length of Stay: 10 days  Discharge Date and Time:  01/30/2023 11:20 AM  Attending Physician: Sachin De Oliveira MD   Discharging Provider: Deana Acevedo MD  Primary Care Provider: Magdaleno Purett MD     HPI: 89F with colonic obstruction at sigmoid colon    Procedure(s) (LRB):  LAPAROTOMY, EXPLORATORY (N/A)  CREATION, COLOSTOMY (N/A)  CLOSURE, FISTULA, COLOVESICAL (N/A)  COLECTOMY, SIGMOID (N/A)  DRAINAGE, ABSCESS (N/A)  DISSECTION-SPLENIC FLEXURE (N/A)  EXCISION, LESION, UTERUS (N/A)     Hospital Course: She was admitted acutely as a transfer from [place].  She was taken to the OR for exploration, sigmoid colon resection, repair colovesical fistula, and colostomy creation with drainage of abscess.  She improved significantly in time, and bowel function returned.  She is now tolerating diet.  TPN is off.  She has been taught Ostomy care, and an appt with [facility]OC as outpatient has been arranged.  She will followup with us in clinic in a week.      Home health has been arranged.     PE   Vs ok  A+ox3, nad  In good spirits  Abd soft, inc clean and intact, area of packing lower pole of wound is clean, nicolas site clean  Blum removed  Stoma clean and without issue    Consults:   Consults (From admission, onward)          Status Ordering Provider     Inpatient consult to Care Coordinator  Once        Provider:  (Not yet assigned)    Acknowledged LUANNE PETE     Inpatient consult to Registered Dietitian/Nutritionist  Once        Provider:  (Not yet assigned)    Completed LUANNE PETE     Inpatient consult to Registered Dietitian/Nutritionist  Once        Provider:  (Not yet assigned)    Completed LUANNE PETE     Pharmacy to dose Vancomycin consult  Once        Provider:  (Not yet assigned)   See Hyperspace for full  Linked Orders Report.    Acknowledged DEXTER LEÓN     Inpatient consult to General Surgery  Once        Provider:  LUANNE Pete MD    Acknowledged LUANNE PETE            Significant Diagnostic Studies: Labs: CMP   Recent Labs   Lab 01/29/23 0238 01/30/23  0346    133   K 4.0 4.4   CO2 24 23   BUN 23.0* 22.0*   CREATININE 0.65 0.68   CALCIUM 7.7* 7.9*   ALBUMIN 1.5* 1.5*   , CBC   Recent Labs   Lab 01/29/23 0238 01/30/23  0346   WBC 9.8 9.6   HGB 10.6* 10.6*   HCT 33.1* 32.9*    263   , and INR No results found for: INR, PROTIME    Pending Diagnostic Studies:       Procedure Component Value Units Date/Time    CBC auto differential [[id number]]     Order Status: Sent Lab Status: No result     Specimen: Blood     Narrative:      The following orders were created for panel order CBC auto differential.  Procedure                               Abnormality         Status                     ---------                               -----------         ------                     CBC with Differential[[id number]]                                                         Please view results for these tests on the individual orders.    CBC with Differential [[id number]]     Order Status: Sent Lab Status: No result     Specimen: Blood     [place] GENERIC ORDERABLE ANIDE [[id number]] Collected: 01/20/23 0559    Order Status: Sent Lab Status: In process Updated: 01/27/23 1148    Specimen: Other from Blood     [place] GENERIC ORDERABLE MMLSA [[id number]] Collected: 01/20/23 0559    Order Status: Sent Lab Status: In process Updated: 01/27/23 1148    Specimen: Other from Blood     Magnesium [[id number]]     Order Status: Sent Lab Status: No result     Specimen: Blood           Final Active Diagnoses:    Diagnosis Date Noted POA    PRINCIPAL PROBLEM:  Colon obstruction [K56.609] 01/20/2023 Yes    Hypomagnesemia [E83.42] 01/28/2023 No    Postoperative anemia [D64.9] 01/22/2023 No    CAD (coronary artery  disease) [I25.10] 01/21/2023 Yes     Chronic    PVD (peripheral vascular disease) [I73.9] 01/21/2023 Yes     Chronic    Hypertension [I10]  Yes     Chronic    TYLER (acute kidney injury) [N17.9]  Yes    Dehydration [E86.0]  Yes      Problems Resolved During this Admission:      Discharged Condition: good    Disposition: Home or Self Care    Follow Up:   Follow-up Information       Deana Acevedo MD Follow up on 2/9/2023.    Specialty: General Surgery  Why: tayla office on 2/9/2023 @ 2:15  Contact information:  [address] [place] [address]  [phone]               Magdaleno Pruett MD Follow up.    Specialty: Family Medicine  Contact information:  [address] [facility] [address] [phone]               [facility] Follow up.    Specialty: Wound Care  Why: Need for new colostomy follow up, couple of visits to be sure pt and fly caring for it correctly and for skin check and any education needed.  Contact information:  [address] [place] [id number] [phone]                         Patient Instructions:      Ambulatory referral/consult to Home Health   Standing Status: Future   Referral Priority: Routine Referral Type: Home Health   Referral Reason: Specialty Services Required   Requested Specialty: Home Health Services   Number of Visits Requested: 1     Diet Adult Regular   Order Comments: Soft food     Lifting restrictions   Order Comments: No more than 10 pounds for 6 weeks after surgery     Notify your health care provider if you experience any of the following:  temperature >100.4     Notify your health care provider if you experience any of the following:  persistent nausea and vomiting or diarrhea     Notify your health care provider if you experience any of the following:  severe uncontrolled pain     Notify your health care provider if you experience any of the following:  redness, tenderness, or signs of  infection (pain, swelling, redness, odor or green/yellow discharge around incision site)     Activity as tolerated     Medications:  Reconciled Home Medications:      Medication List        CONTINUE taking these medications      aspirin 81 MG EC tablet  Commonly known as: ECOTRIN  Take 81 mg by mouth.     carvediloL 6.25 MG tablet  Commonly known as: COREG  Take 6.25 mg by mouth 2 (two) times daily.     furosemide 40 MG tablet  Commonly known as: LASIX  Take 40 mg by mouth every morning.     ketorolac 10 mg tablet  Commonly known as: TORADOL  Take 10 mg by mouth once.     lactulose 10 gram/15 mL solution  Commonly known as: CHRONULAC  Take by mouth.     metOLazone 5 MG tablet  Commonly known as: ZAROXOLYN  Take 5 mg by mouth once daily.     traZODone 100 MG tablet  Commonly known as: DESYREL  Take 100 mg by mouth every evening.     valsartan-hydrochlorothiazide 160-12.5 mg per tablet  Commonly known as: DIOVAN-HCT  Take 1 tablet by mouth.              Deana Acevedo MD  General Surgery  [facility] - Medical Surgical Unit

## 2023-01-30 NOTE — PLAN OF CARE
[facility] and [facility] Wound care clinic, do not see ostomy patients in their wound care centers.  Per [facility]ound [facility], [facility] wound care will see ostomy pts.  Referral sent to [facility] and they will contact pt with an apt date and time.

## 2023-01-30 NOTE — NURSING
Wound care done per Dr. Hairston orders at bedside. Packed opening at bottom of midline incision with wet to dry gauze and covered with 4x4 and tegaderm. Spencer site covered with 4x4 and tegaderm. Every other suture removed from midline incision, total of 7 sutures removed.

## 2023-01-30 NOTE — NURSING
Patient's right arm is swollen, swelling  noted to lower arm at 1540, entire arm swelling noted at 1740. Patient c/o pain to arm since 01/28/2023. Both ports flushed w/out difficulty with blood return from both lumens; no swelling was noted on yesterday to arm, slight bruise which the supervisor, MELANIE Brian and a PICC RN - Ольга Contreras RN notified, Ольга educated me that as long as there was no swelling she was ok just to monitor site closely and I did as she said, MELANIE Brian notified of changes and ordered for removal of PICC line and to notify Dr. CRAIG Acevedo of PICC status, New order to D/C PICC line, Clinimix, change IV pepcid, and IV protonix to PO form and may leave patient without IV access unless she is not D/C'd tomorrow. Will pass along in report to oncoming shift. Daughter Sahara notified of changes

## 2023-01-30 NOTE — DISCHARGE INSTRUCTIONS
[facility] in [place] wound care clinic will call you with an apt. To be seen for follow up on your new colostomy.      Critical access hospital will see you at home. Phone is [phone]

## 2023-01-30 NOTE — OPERATIVE NOTE ADDENDUM
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  I was asked by Dr.shirin Acevedo to scrub in the case and evaluate.  Patient had a hole in the bladder that needed to be repaired.  It was excised clear margins were obtained mucosa to mucosa and muscular layers were closed with interrupted 0 Vicryl in a single layer fashion.  300 cc of milk were injected and distention was without any leaks.  Patient will undergo further treatment and care per Dr. JOHN Acevedo recommendations

## 2023-02-01 ENCOUNTER — PATIENT OUTREACH (OUTPATIENT)
Dept: ADMINISTRATIVE | Facility: CLINIC | Age: 88
End: 2023-02-01
Payer: MEDICARE

## 2023-02-01 NOTE — PROGRESS NOTES
C3 nurse spoke with Bailey Medina   for a TCC post hospital discharge follow up call. The patient has a scheduled HOSFU appointment with Magdaleno Pruett MD  No apt yet has apt with Dr Curtis TRIPLETT  on 2/9/2023 @ 10:30AM.

## 2023-02-07 LAB — MAYO GENERIC ORDERABLE RESULT: NORMAL

## 2023-02-08 LAB — MAYO GENERIC ORDERABLE RESULT: NORMAL

## 2023-02-09 LAB
BACTERIA BLD CULT: ABNORMAL
GRAM STN SPEC: ABNORMAL

## 2023-02-13 NOTE — PROGRESS NOTES
"[facility] Note    Patient Name: Bailey Medina  Age: 89 y.o.   MRN: [id number]  Admission Date: 1/20/2023  4:00 AM   Patient Class: IP- Inpatient  Benefits: Payor: MEDICARE / Plan: MEDICARE PART A & B / Product Type: Government /    Primary Care Provider: Magdaleno Pruett MD   Pharmacy:   [facility] [address] [facility]  [address] [facility] [address]  Phone: [phone] Fax: [phone]    Code Status: Prior      Chief Complaint:   Chief Complaint   Patient presents with    Abdominal Pain     Pt transferred from [facility], dx with SBO        Admit Diagnosis:   Dehydration [E86.0]  Small bowel obstruction [K56.609]  Large bowel obstruction [K56.609]  Nausea and vomiting, unspecified vomiting type [R11.2]     HPI:  89yo WF with h/o CAD, PVD, HTN, megadiverticulum initially presented to ED in [place] c/o Abd Pain with Liquid Diarrhea x2 days. She began having N/V as well and decided to seek care. Workup there with CT A/P suggested SBO/Colon Obstruction and Pneumotosis. She was then transferred here to [facility] in [place] for Surgical evaluation. Pt c/o abd pain controlled with current analgesia, thirst and some nausea. Labs reveal dehydration. WBC nml. NGT to suction. Awaiting GenSurg eval.    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  2.Sigmoid colon resection.  3.Drainage of abscess. 4.Repair of fistula to the bladder.  5.Excision leiomyoma of the uterus. 6.Takedown splenic flexure.  Pt feels "OK" today with abd pain from surgery.     1/22/23  - No new complaints today. Still not feeling well with abd pain when she moves around, otherwise " comfortable at rest. Hgb 8.5 from 10.6. She has been somewhat hypOtensive and BUN/Cr slightly increased.  Will transfuse 2u PRBC's which will also give volume expansion and hopefully increased renal perfusion.    1/23/23  - NGT placed. No acute complaints. Cr 1.81 improved from 2.00 yest.    1/24/23  - Feeling better. Cr 1.29 from 1.81.    1/25/23  - No acute complaints. Passing some flatus. Cr 1.07 from 1.29.    1/26/23-Patient states some flatus.  Still has NG tube in place but hopefully surgery will remove soon.      1/27/23-NG tube has been removed.  She was started on clears and appears to be tolerating at this time.  Will continue with current meds and treatment.    1/28/23-Patient is doing well at this time.  She is tolerating a clear liquid diet.  Her Mag is low which will be replaced.      1/29  - No new complaints. Feels rather well. Marielena PO. Labs stable. Home soon.        OBJECTIVE:     Physical Exam  Constitutional:       General: She is awake. She is not in acute distress.     Appearance: She is well-developed. She is not ill-appearing.   HENT:      Mouth/Throat:      Mouth: Mucous membranes are moist.      Pharynx: Oropharynx is clear.   Cardiovascular:      Rate and Rhythm: Normal rate and regular rhythm.   Pulmonary:      Effort: Pulmonary effort is normal.      Breath sounds: Normal breath sounds.   Abdominal:      Palpations: Abdomen is soft.      Tenderness: There is abdominal tenderness.   Skin:     General: Skin is warm and dry.   Neurological:      General: No focal deficit present.      Mental Status: She is alert and oriented to person, place, and time. Mental status is at baseline.   Psychiatric:         Behavior: Behavior is cooperative.      Labs/imaging reviewed         ASSESSMENT/PLAN:       Colon obstruction    TYLER (acute kidney injury)    Dehydration    Postoperative anemia    CAD (coronary artery disease)    PVD (peripheral vascular disease)    Hypertension    Hypomagnesemia       -  cont current        VTE Risk Mitigation (From admission, onward)           Ordered     IP VTE HIGH RISK PATIENT  Once         01/20/23 8238                           Sachin De Oliveira MD  [facility] Medicine   [facility]

## 2023-02-13 NOTE — DISCHARGE SUMMARY
Discharge Summary   [facility] Hospital Medicine           Patient Name: Bailey Medina  : 1930  Age: 89 y.o.  MRN: [id number]    Admit Date: 2023  4:00 AM  Discharge Date: 2023  1:36 PM     Admitting Physician:  Sachin De Oliveira MD   Attending Physician: Sachin De Oliveira MD  Discharge Physician: Sachin De Oliveira MD  Primary Care Physician: Magdaleno Pruett MD      Discharge Diagnoses:  Final Active Diagnoses:    Diagnosis Date Noted POA    PRINCIPAL PROBLEM:  Colon obstruction [K56.609] 2023 Yes    Postoperative anemia [D64.9] 2023 No    TYLER (acute kidney injury) [N17.9]  Yes    Dehydration [E86.0]  Yes    Hypomagnesemia [E83.42] 2023 No    CAD (coronary artery disease) [I25.10] 2023 Yes     Chronic    PVD (peripheral vascular disease) [I73.9] 2023 Yes     Chronic    Hypertension [I10]  Yes     Chronic      Problems Resolved During this Admission:       Consults:  Consults (From admission, onward)          Status Ordering Provider     Inpatient consult to Registered Dietitian/Nutritionist  Once        Provider:  (Not yet assigned)    Completed LUANNE PETE     Inpatient consult to Registered Dietitian/Nutritionist  Once        Provider:  (Not yet assigned)    Completed LUANNE PETE               CC: Abdominal Pain (Pt transferred from [facility], dx with SBO)       HPI:  89yo WF with h/o CAD, PVD, HTN, megadiverticulum initially presented to ED in [place] c/o Abd Pain with Liquid Diarrhea x2 days. She began having N/V as well and decided to seek care. Workup there with CT A/P suggested SBO/Colon Obstruction and Pneumotosis. She was then transferred here to [facility] in [place] for Surgical evaluation. Pt c/o abd pain controlled with current analgesia, thirst and some nausea. Labs reveal dehydration. WBC nml. NGT to suction. Awaiting GenSurg eval.     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  2.Sigmoid colon resection.  3.Drainage of abscess. 4.Repair of fistula to the bladder.  5.Excision leiomyoma of the uterus. 6.Takedown splenic flexure.  Pt feels "OK" today with abd pain from surgery.     1/22/23  - No new complaints today. Still not feeling well with abd pain when she moves around, otherwise comfortable at rest. Hgb 8.5 from 10.6. She has been somewhat hypOtensive and BUN/Cr slightly increased.  Will transfuse 2u PRBC's which will also give volume expansion and hopefully increased renal perfusion.    1/23/23  - NGT placed. No acute complaints. Cr 1.81 improved from 2.00 yest.    1/24/23  - Feeling better. Cr 1.29 from 1.81.    1/25/23  - No acute complaints. Passing some flatus. Cr 1.07 from 1.29.    1/26/23-Patient states some flatus.  Still has NG tube in place but hopefully surgery will remove soon.      1/27/23-NG tube has been removed.  She was started on clears and appears to be tolerating at this time.  Will continue with current meds and treatment.    1/28/23-Patient is doing well at this time.  She is tolerating a clear liquid diet.  Her Mag is low which will be replaced.      1/29  - No new complaints. Feels rather well. Jane PO. Labs stable. Home soon.    1/30  - Feels about at baseline. No acute complaints or overnight events. Cont to jane PO. Having Bms. Cr normalized now 0.68. H/H stable at 10.6/32.9.  Cleared for discharge by GenSur. Will discharge in improved/stable condition to home with .         Significant Diagnostic Studies: See Full reports for all details    X-Ray Abdomen Flat And Erect    Result Date: 1/23/2023  EXAMINATION: XR ABDOMEN FLAT AND ERECT CLINICAL HISTORY: XR ABDOMEN FLAT AND ERECT, . COMPARISON: None available FINDINGS: Upright and supine views reveal gas distended loops of small bowel throughout the " abdomen.  No free air seen outside of bowel lumen.  A catheter seen within the midline lower pelvis.  A 2nd catheter is noted to the pelvis extending from the right.  Degenerative changes and mild curvature are noted to the thoracolumbar spine.  Overlying surgical staples are evident at the right lower abdomen/pelvis     1. Gas distended loops of small bowel throughout the abdomen suggesting ileus versus bowel obstruction.  Clinical correlation is indicated 2. Surgical staples at the right lower abdomen/pelvis with catheter at the pelvis extending from the right suggesting a drainage catheter.  Clinical correlation is indicated 3. Suspect Blum catheter at the midline lower pelvis.  Clinical correlation is indicated Electronically signed by: David Jones Date:    01/23/2023 Time:    13:14    X-Ray Chest 1 View for Line/Tube Placement    Result Date: 1/25/2023  EXAMINATION: XR CHEST 1 VIEW FOR LINE/TUBE PLACEMENT CLINICAL HISTORY: ; picc;. COMPARISON: None available. FINDINGS: Single AP view reveals the heart to be mildly enlarged.  The trachea is mostly midline.  Atherosclerosis is seen within the aorta.  A right PICC line is present with the tip superimposed over the SVC.  NG tube is present with the tip not included on the exam.  The side hole appears to be near the GE junction.  No infiltrate or effusion is seen.  There is mild elevation right hemidiaphragm.  There is mild prominence of the right hilum suspicious for central pulmonary vasculature.     1. Mild cardiomegaly 2. Atherosclerosis 3. Mildly elevated right hemidiaphragm 4. NG tube 5. Right PICC line Electronically signed by: David Jones Date:    01/25/2023 Time:    14:17       Microbiology Results (last 7 days)       Procedure Component Value Units Date/Time    Blood Culture [[id number]]  (Normal) Collected: 01/21/23 1021    Order Status: Completed Specimen: Blood, Venous Updated: 01/26/23 1600     CULTURE, BLOOD ([facility]) No Growth at 5 days    Blood  "Culture [[id number]]  (Normal) Collected: 01/21/23 1021    Order Status: Completed Specimen: Blood, Venous Updated: 01/26/23 1600     CULTURE, BLOOD ([facility]) No Growth at 5 days    Anaerobic Culture [[id number]]  (Abnormal) Collected: 01/20/23 2003    Order Status: Completed Specimen: Abscess from Abdomen Updated: 01/26/23 1145     Anaerobe Culture Bacteroides ovatus group      Parabacteroides species     Comment: Anaerobic sensitivity is not usually indicated except on single isolates from sterile body sites.       Wound Culture [[id number]]  (Abnormal)  (Susceptibility) Collected: 01/20/23 2003    Order Status: Completed Specimen: Abscess from Abdomen Updated: 01/24/23 0748     Wound Culture Many Escherichia coli      Many Citrobacter braakii      Many Enterococcus faecium     Comment: Ampicillin results may be used to predict susceptibility to amoxicillin-clavulanate, ampicillin-sulbactam, and piperacillin-tazobactam.       Wound Culture [[id number]] Collected: 01/20/23 1948    Order Status: Completed Specimen: Abscess from Abdomen Updated: 01/24/23 0731     Wound Culture No Growth    Anaerobic Culture [[id number]] Collected: 01/20/23 1948    Order Status: Completed Specimen: Abscess from Abdomen Updated: 01/24/23 0729     Anaerobe Culture No Anaerobes Isolated               Physical Exam:    Blood pressure (!) 129/91, pulse 90, temperature 97.8 °F (36.6 °C), temperature source Oral, resp. rate 18, height 5' 1" (1.549 m), weight 84.5 kg (186 lb 4.6 oz), SpO2 97 %.    Physical Exam  Constitutional:       Appearance: Normal appearance.   Cardiovascular:      Rate and Rhythm: Normal rate and regular rhythm.   Pulmonary:      Effort: Pulmonary effort is normal.      Breath sounds: Normal breath sounds.   Abdominal:      General: Bowel sounds are normal.      Palpations: Abdomen is soft.      Tenderness: There is abdominal tenderness (mild).   Skin:     General: Skin is warm and dry.   Neurological:      General: No focal " deficit present.      Mental Status: She is alert. Mental status is at baseline.   Psychiatric:         Mood and Affect: Mood normal.         Behavior: Behavior normal.            Discharge Instructions:  Activity: activity as tolerated  Diet: regular diet  Discharge Medications:      Medication List        CONTINUE taking these medications      aspirin 81 MG EC tablet  Commonly known as: ECOTRIN     carvediloL 6.25 MG tablet  Commonly known as: COREG     furosemide 40 MG tablet  Commonly known as: LASIX     ketorolac 10 mg tablet  Commonly known as: TORADOL     lactulose 10 gram/15 mL solution  Commonly known as: CHRONULAC     metOLazone 5 MG tablet  Commonly known as: ZAROXOLYN     traZODone 100 MG tablet  Commonly known as: DESYREL     valsartan-hydrochlorothiazide 160-12.5 mg per tablet  Commonly known as: DIOVAN-HCT            Follow-Up:   Follow-up Information       Deana Acevedo MD Follow up on 2/9/2023.    Specialty: General Surgery  Why: tayla office on 2/9/2023 @ 2:15  Contact information:  [address] [facility] [address]  [phone]               Magdaleno Pruett MD Follow up.    Specialty: Family Medicine  Contact information:  [address] [facility] [address]  [phone]               [facility] Follow up.    Specialty: Wound Care  Why: Need for new colostomy follow up, couple of visits to be sure pt and fly caring for it correctly and for skin check and any education needed.  Contact information:  [facility] [facility] [address] [phone]                               Disposition: [place] [facility] [place]  Condition at Discharge: stable      Time spent on discharge = 33 minutes            Sachin De Oliveira MD  Hospital Medicine  [facility]

## 2023-02-20 LAB
FUNGUS SPEC CULT: NORMAL
FUNGUS SPEC CULT: NORMAL

## 2023-04-26 ENCOUNTER — HOSPITAL ENCOUNTER (INPATIENT)
Facility: HOSPITAL | Age: 88
LOS: 9 days | Discharge: NURSING, REHAB OR LONG-TERM CARE | DRG: 603 | End: 2023-05-05
Attending: INTERNAL MEDICINE | Admitting: INTERNAL MEDICINE
Payer: MEDICARE

## 2023-04-26 DIAGNOSIS — L02.211 ABSCESS OF ABDOMINAL WALL: ICD-10-CM

## 2023-04-26 DIAGNOSIS — R10.9 ABDOMINAL PAIN: ICD-10-CM

## 2023-04-26 DIAGNOSIS — K59.00 CONSTIPATION, UNSPECIFIED CONSTIPATION TYPE: ICD-10-CM

## 2023-04-26 DIAGNOSIS — L02.211 ABDOMINAL WALL ABSCESS: Primary | ICD-10-CM

## 2023-04-26 LAB
ALBUMIN SERPL-MCNC: 2.3 G/DL (ref 3.4–4.8)
ALBUMIN/GLOB SERPL: 0.5 RATIO (ref 1.1–2)
ALP SERPL-CCNC: 180 UNIT/L (ref 40–150)
ALT SERPL-CCNC: 16 UNIT/L (ref 0–55)
AST SERPL-CCNC: 23 UNIT/L (ref 5–34)
BASOPHILS # BLD AUTO: 0.06 X10(3)/MCL (ref 0–0.2)
BASOPHILS NFR BLD AUTO: 0.6 %
BILIRUBIN DIRECT+TOT PNL SERPL-MCNC: 0.6 MG/DL
BUN SERPL-MCNC: 33 MG/DL (ref 9.8–20.1)
CALCIUM SERPL-MCNC: 9 MG/DL (ref 8.4–10.2)
CHLORIDE SERPL-SCNC: 95 MMOL/L (ref 98–111)
CO2 SERPL-SCNC: 26 MMOL/L (ref 23–31)
CREAT SERPL-MCNC: 0.97 MG/DL (ref 0.55–1.02)
EOSINOPHIL # BLD AUTO: 0.06 X10(3)/MCL (ref 0–0.9)
EOSINOPHIL NFR BLD AUTO: 0.6 %
ERYTHROCYTE [DISTWIDTH] IN BLOOD BY AUTOMATED COUNT: 14.5 % (ref 11.5–17)
GFR SERPLBLD CREATININE-BSD FMLA CKD-EPI: 55 MLS/MIN/1.73/M2
GLOBULIN SER-MCNC: 4.3 GM/DL (ref 2.4–3.5)
GLUCOSE SERPL-MCNC: 99 MG/DL (ref 75–121)
HCT VFR BLD AUTO: 30.9 % (ref 37–47)
HGB BLD-MCNC: 9 G/DL (ref 12–16)
IMM GRANULOCYTES # BLD AUTO: 0.04 X10(3)/MCL (ref 0–0.04)
IMM GRANULOCYTES NFR BLD AUTO: 0.4 %
LIPASE SERPL-CCNC: 8 U/L
LYMPHOCYTES # BLD AUTO: 0.98 X10(3)/MCL (ref 0.6–4.6)
LYMPHOCYTES NFR BLD AUTO: 9.9 %
MCH RBC QN AUTO: 26.7 PG (ref 27–31)
MCHC RBC AUTO-ENTMCNC: 29.1 G/DL (ref 33–36)
MCV RBC AUTO: 91.7 FL (ref 80–94)
MONOCYTES # BLD AUTO: 0.81 X10(3)/MCL (ref 0.1–1.3)
MONOCYTES NFR BLD AUTO: 8.1 %
NEUTROPHILS # BLD AUTO: 7.99 X10(3)/MCL (ref 2.1–9.2)
NEUTROPHILS NFR BLD AUTO: 80.4 %
NRBC BLD AUTO-RTO: 0 %
PLATELET # BLD AUTO: 334 X10(3)/MCL (ref 130–400)
PMV BLD AUTO: 9.7 FL (ref 7.4–10.4)
POTASSIUM SERPL-SCNC: 4.6 MMOL/L (ref 3.5–5.1)
PROT SERPL-MCNC: 6.6 GM/DL (ref 5.8–7.6)
RBC # BLD AUTO: 3.37 X10(6)/MCL (ref 4.2–5.4)
SODIUM SERPL-SCNC: 131 MMOL/L (ref 132–146)
WBC # SPEC AUTO: 9.9 X10(3)/MCL (ref 4.5–11.5)

## 2023-04-26 PROCEDURE — 83690 ASSAY OF LIPASE: CPT | Performed by: NURSE PRACTITIONER

## 2023-04-26 PROCEDURE — 25000003 PHARM REV CODE 250: Performed by: NURSE PRACTITIONER

## 2023-04-26 PROCEDURE — 25000003 PHARM REV CODE 250: Performed by: INTERNAL MEDICINE

## 2023-04-26 PROCEDURE — 96360 HYDRATION IV INFUSION INIT: CPT

## 2023-04-26 PROCEDURE — 11000001 HC ACUTE MED/SURG PRIVATE ROOM

## 2023-04-26 PROCEDURE — 25000003 PHARM REV CODE 250: Performed by: EMERGENCY MEDICINE

## 2023-04-26 PROCEDURE — 25500020 PHARM REV CODE 255: Performed by: NURSE PRACTITIONER

## 2023-04-26 PROCEDURE — 99285 EMERGENCY DEPT VISIT HI MDM: CPT | Mod: 25

## 2023-04-26 PROCEDURE — 85025 COMPLETE CBC W/AUTO DIFF WBC: CPT | Performed by: NURSE PRACTITIONER

## 2023-04-26 PROCEDURE — 94761 N-INVAS EAR/PLS OXIMETRY MLT: CPT

## 2023-04-26 PROCEDURE — 63600175 PHARM REV CODE 636 W HCPCS: Performed by: EMERGENCY MEDICINE

## 2023-04-26 PROCEDURE — 80053 COMPREHEN METABOLIC PANEL: CPT | Performed by: NURSE PRACTITIONER

## 2023-04-26 RX ORDER — SODIUM CHLORIDE 9 MG/ML
INJECTION, SOLUTION INTRAVENOUS CONTINUOUS
Status: DISCONTINUED | OUTPATIENT
Start: 2023-04-26 | End: 2023-04-30

## 2023-04-26 RX ORDER — SODIUM CHLORIDE 0.9 % (FLUSH) 0.9 %
10 SYRINGE (ML) INJECTION
Status: DISCONTINUED | OUTPATIENT
Start: 2023-04-26 | End: 2023-05-05 | Stop reason: HOSPADM

## 2023-04-26 RX ADMIN — IOPAMIDOL 100 ML: 755 INJECTION, SOLUTION INTRAVENOUS at 03:04

## 2023-04-26 RX ADMIN — SODIUM CHLORIDE 1000 ML: 9 INJECTION, SOLUTION INTRAVENOUS at 03:04

## 2023-04-26 RX ADMIN — SODIUM CHLORIDE: 9 INJECTION, SOLUTION INTRAVENOUS at 08:04

## 2023-04-26 RX ADMIN — PIPERACILLIN AND TAZOBACTAM 4.5 G: 4; .5 INJECTION, POWDER, LYOPHILIZED, FOR SOLUTION INTRAVENOUS; PARENTERAL at 05:04

## 2023-04-26 NOTE — ED PROVIDER NOTES
Encounter Date: 4/26/2023       History     Chief Complaint   Patient presents with    Constipation     Pt has no complaints   caretaker reports home health nurse feels pt has a knot / protrusion above colostomy and feels pt is constipated     Patient is a 89-year-old female was brought in with caretaker for concerns of constipation and a hard mass in abdomen.  Patient has history of small-bowel obstruction in January with colectomy placed at that time after resection.  Has had hard stools for the last 4 days with her caretaker giving her as needed medication which has helped her have a bowel movement but states it has been very hard formed and aminah.    Review of patient's allergies indicates:  No Known Allergies  Past Medical History:   Diagnosis Date    Diverticulosis     Hypertension      Past Surgical History:   Procedure Laterality Date    ABSCESS DRAINAGE N/A 1/20/2023    Procedure: DRAINAGE, ABSCESS;  Surgeon: LUANNE Cantor MD;  Location: 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     COLECTOMY, SIGMOID N/A 1/20/2023    Procedure: COLECTOMY, SIGMOID;  Surgeon: LUANNE Cantor MD;  Location: [facility];  Service: General;  Laterality: N/A;  1. Very distended colon with extremely distended cecum with serosal tear that   was repaired at the end of the case.  All colon viable.  2. Sigmoid colon obstruction related to diverticular stricture.  Abscess versus   donte diverticulum-drained.  3. Colovesical fistula at t    COLOSTOMY N/A 1/20/2023    Procedure: CREATION, COLOSTOMY;  Surgeon: LUANNE Cantor MD;  Location: [facility] OR;  Service: General;  Laterality: N/A;    DISSECTION-SPLENIC FLEXURE N/A 1/20/2023    Procedure:  DISSECTION-SPLENIC FLEXURE;  Surgeon: LUANNE Cantor MD;  Location: [facility] OR;  Service: General;  Laterality: N/A;  take down splenic flexure    LAPAROTOMY, EXPLORATORY N/A 1/20/2023    Procedure: LAPAROTOMY, EXPLORATORY;  Surgeon: LUANNE Cantor MD;  Location: [facility] OR;  Service: General;  Laterality: N/A;  OPERATION:    1. Exploratory laparotomy.    2. Sigmoid colon resection.    3. Drainage of abscess.   4. Repair of fistula to the bladder.    5. Excision leiomyoma of the uterus.  6. Takedown splenic flexure.       SURGICAL REMOVAL OF LESION OF UTERUS N/A 1/20/2023    Procedure: EXCISION, LESION, UTERUS;  Surgeon: LUANNE Cantor MD;  Location: [facility] OR;  Service: General;  Laterality: N/A;  Pedunculated leiomyoma of the uterus-excised.     No family history on file.  Social History     Tobacco Use    Smoking status: Never    Smokeless tobacco: Never   Substance Use Topics    Alcohol use: Never    Drug use: Never     Review of Systems   Constitutional:  Negative for activity change, appetite change and fever.   HENT:  Negative for congestion, dental problem and sore throat.    Eyes:  Negative for discharge and itching.   Respiratory:  Negative for apnea, chest tightness and shortness of breath.    Cardiovascular:  Negative for chest pain.   Gastrointestinal:  Positive for abdominal distention, abdominal pain and constipation. Negative for nausea and vomiting.   Endocrine: Negative for cold intolerance and heat intolerance.   Genitourinary:  Negative for dysuria.   Musculoskeletal:  Negative for back pain, neck pain and neck stiffness.   Skin:  Negative for rash.   Neurological:  Negative for dizziness, facial asymmetry and weakness.   Hematological:  Does not bruise/bleed easily.   Psychiatric/Behavioral:  Negative for agitation and behavioral problems.    All other systems reviewed and are negative.    Physical Exam     Initial Vitals [04/26/23 1056]   BP Pulse Resp Temp SpO2    137/77 70 16 97.5 °F (36.4 °C) 97 %      MAP       --         Physical Exam    Nursing note and vitals reviewed.  Constitutional: Vital signs are normal. She appears well-developed and well-nourished.  Non-toxic appearance. She does not have a sickly appearance.   HENT:   Head: Normocephalic and atraumatic.   Right Ear: External ear normal.   Left Ear: External ear normal.   Eyes: Conjunctivae, EOM and lids are normal. Lids are everted and swept, no foreign bodies found.   Neck: Trachea normal and phonation normal. Neck supple. No thyroid mass and no thyromegaly present.   Normal range of motion.   Full passive range of motion without pain.     Cardiovascular:  Normal rate, regular rhythm, S1 normal, S2 normal, normal heart sounds, intact distal pulses and normal pulses.           Pulmonary/Chest: Breath sounds normal. No respiratory distress.   Abdominal: She exhibits distension. There is abdominal tenderness.   Abdomen is soft with the exception of hard firm nodular like protrusion to left upper quadrant just above colectomy.  Bowel sounds present but sluggish throughout. There is no rebound and no guarding.   Musculoskeletal:         General: Normal range of motion.      Cervical back: Full passive range of motion without pain, normal range of motion and neck supple.     Lymphadenopathy:     She has no cervical adenopathy.   Neurological: She is alert and oriented to person, place, and time. She has normal strength. GCS score is 15. GCS eye subscore is 4. GCS verbal subscore is 5. GCS motor subscore is 6.   Skin: Skin is warm, dry and intact. Capillary refill takes less than 2 seconds.   Psychiatric: She has a normal mood and affect. Her speech is normal and behavior is normal. Judgment normal. Cognition and memory are normal.       ED Course   Procedures  Labs Reviewed   COMPREHENSIVE METABOLIC PANEL - Abnormal; Notable for the following components:       Result Value    Sodium Level 131 (*)     Chloride 95  (*)     Blood Urea Nitrogen 33.0 (*)     Albumin Level 2.3 (*)     Globulin 4.3 (*)     Albumin/Globulin Ratio 0.5 (*)     Alkaline Phosphatase 180 (*)     All other components within normal limits   CBC WITH DIFFERENTIAL - Abnormal; Notable for the following components:    RBC 3.37 (*)     Hgb 9.0 (*)     Hct 30.9 (*)     MCH 26.7 (*)     MCHC 29.1 (*)     All other components within normal limits   LIPASE - Normal   CBC W/ AUTO DIFFERENTIAL    Narrative:     The following orders were created for panel order CBC auto differential.  Procedure                               Abnormality         Status                     ---------                               -----------         ------                     CBC with Differential[[id number]]        Abnormal            Final result                 Please view results for these tests on the individual orders.   URINALYSIS, REFLEX TO URINE CULTURE          Imaging Results              CT Abdomen Pelvis With Contrast (Final result)  Result time 04/26/23 15:41:16      Final result by David Jones MD (04/26/23 15:41:16)                   Impression:      1. Interim development of a suspect septated left anterior abdominal wall abscess measuring up to 8 cm in diameter  2. Interim resolution of previous dilated fluid-filled loops of bowel and placement of a ostomy at the anterior left lower abdomen.  There is a small fluid collection surrounding the ostomy within the subcutaneous fat  3. Findings of constipation  4. Cardiomegaly  5. Elevated right hemidiaphragm  6. Findings compatible with cysts to the kidneys bilaterally which have a similar appearance  7. Bilateral renal cortical thinning and perinephric stranding  8. Moderately distended gallbladder with Phrygian cap  9. Nonvisualization of the appendix with low lying cecum.  A repeat exam with oral contrast would allow further evaluation if clinically indicated      Electronically signed by:   Robert  Date:    04/26/2023  Time:    15:41               Narrative:    EXAMINATION:  CT ABDOMEN PELVIS WITH CONTRAST    CLINICAL HISTORY:  Bowel obstruction suspected;, .    TECHNIQUE:  PATIENT RADIATION DOSE: DLP(mGycm) : 767    As per PQRS measures, all CT scans at this facility used dose modulation, iterative reconstruction, and/or weight based dose adjustment when appropriate to reduce radiation dose to as low as reasonably achievable.    COMPARISON:  01/20/2023    FINDINGS:  Serial axial images were obtained through the abdomen and pelvis with the administration of  IV contrast. Coronal and sagittal reconstructions where also obtained. Degenerative changes and curvature are noted to the thoracolumbar spine.  Bony structures are osteopenic.  The heart is enlarged.  Atelectasis and/or scarring is evident the lung bases.  There is beam hardening artifact due to patient body habitus.  The liver is mildly decreased in density suggesting steatosis.  There elevation of the right hemidiaphragm.  The spleen, adrenal glands, and pancreas are grossly within normal limits.  The gallbladder is moderately distended with a Phrygian cap.  Atherosclerosis is seen within the aorta and branching vessels.  The kidneys are relatively symmetric in size.  No hydronephrosis is seen.  There is bilateral renal cortical thinning and perinephric stranding.  Round low-attenuation foci are again evident at the kidneys bilaterally suspicious for cysts which have a similar appearance to the recent exam.  No periaortic or pericaval lymphadenopathy is identified no dilated loops of bowel are seen.  Gas and feces are evident throughout the colon.  The appendix is not identified with certainty.  The cecum appears low lying in position.  The uterus is somewhat small and size.  The bladder is partially distended with fluid.  No free fluid collection is seen.  There is interim placement of an ostomy at the anterior left lower pelvis with the small  localized fluid collection in the subcutaneous tissues.  There is interim development of a lobulated/septated cystic focus within the mid upper left anterior abdominal wall musculature measuring 5.0 x 7.9 x 6.4 cm.  There are surrounding hazy walled off margins suggesting a abscess formation.  There is interim resolution of previous dilated fluid-filled loops of bowel in the lower abdomen since the prior exam..                                       X-Ray Abdomen Flat And Erect (Final result)  Result time 04/26/23 14:25:53      Final result by David Jones MD (04/26/23 14:25:53)                   Impression:      1. No diagnostic acute abdominal abnormality identified.  2. Curvilinear lucencies at the lower abdomen and pelvis suspicious for atypical prominent skin folds at the buttocks.  Clinical correlation is indicated.  3. Atherosclerosis  4. Thoracic spondylosis, scoliosis, and osteopenia      Electronically signed by: David Jones  Date:    04/26/2023  Time:    14:25               Narrative:    EXAMINATION:  XR ABDOMEN FLAT AND ERECT    CLINICAL HISTORY:  XR ABDOMEN FLAT AND ERECT, Unspecified abdominal pain.    COMPARISON:  01/23/2023    FINDINGS:  Upright and supine views reveal a nonspecific bowel gas pattern without evidence of obstruction. No free air is seen outside of bowel lumen. Gas and feces are seen within the colon.  A fecal bolus is present within the rectum.  Degenerative changes and curvature are noted to the thoracolumbar spine.  Bony structures are osteopenic.  Extensive atherosclerosis is seen.  Unusual curvilinear lucencies are noted to the lower abdomen and pelvis suspicious for atypical a prominent skin folds at the buttocks.                                       Medications   sodium chloride 0.9% bolus 1,000 mL 1,000 mL (1,000 mLs Intravenous New Bag 4/26/23 3775)   sodium chloride 0.9% flush 10 mL (has no administration in time range)   iopamidoL (ISOVUE-370) injection 100 mL (100 mLs  Intravenous Given 4/26/23 0374)                Attending Attestation:     Physician Attestation Statement for NP/PA:   I have conducted a face to face encounter with this patient in addition to the NP/PA, due to    Other NP/PA Attestation Additions:    History of Present Illness: Colostomy in the left lower quadrant progressive abdominal pain hardness and swelling just above the colostomy in the abdominal wall constipation.  Sent in from the home caregiver came with her.  She makes no bones about it she said she does not want to be coded when I asked her about this.  She says this has been a progressive problem she is not in any discomfort right now but    She said if something bad happens she does not want to be resuscitated.  She has a colostomy is draining a small amount   Physical Exam: Of stool but not normal.  She denies fever or chills or nausea vomiting pleasant elderly woman well-spoken she has a hard mass just above her colostomy in the left mid to upper abdomen the colostomy has a small amount of gas and stool in the bag it is tender to touch it is well-circumscribed probably 8 cm.  It is not erythematous or pointing on the skin side.  Heart is regular rate and rhythm lungs are clear   Medical Decision Making: MDM  Problems addressed  Co-morbidities and/or factors adding to the complexity or risk for the patient:  Advanced age  Problems addressed:  Peristomal abscess in the abdominal wall constipation  Acute problem/illness or progression/exacerbation of chronic problem with potential threat to life/bodily dysfunction?:  Any infection in Lady of this age is an issue  Differential diagnoses/problems considered: see above     Amount and/or Complexity of Data Reviewed  Independent Historian: none (see above for summary)  External Data Reviewed: notes from previous admissions (see above for summary)  Risk and benefits of testing: discussed   Labs: Labs: ordered and reviewed  Radiology:Radiology: ordered and  independent interpretation performed (see above or ED course)  ECG/medicine tests:Radiology: ordered and independent interpretation performed (see above or ED course)  discussed with hospitalist physician and discussed with general surgery consultant consultant because of her age surgeon asked the hospitalist to admit he agreed  none    Procedure Note: Physical evaluation discussion of code status.  Consultations observed and patient will be admitted empiric antibiotics I am going to start Zosyn as this is probably having to do with the colostomy          ED Course as of 04/26/23 1651   Wed Apr 26, 2023   1644 Spoke to hospitalist who is okay with admission as well as the on-call surgeon Dr. Acevedo who is familiar with this patient as she did her colostomy and colon resection. [SL]      ED Course User Index  [SL] GERARDO Canas                 Clinical Impression:   Final diagnoses:  [R10.9] Abdominal pain  [L02.211] Abdominal wall abscess (Primary)  [K59.00] Constipation, unspecified constipation type        ED Disposition Condition    Admit Stable                David Medina MD  04/26/23 8845

## 2023-04-26 NOTE — H&P
Hospital Medicine  History & Physical Examination       Patient: Bailey Medina  MRN: [id number]  STATUS: IP- Inpatient   DOS: 4/26/2023   PCP: Magdaleno Pruett MD      CC: Abd wall mass       HISTORY OF PRESENT ILLNESS   89 y.o. female with a history that includes colonic obstruction back in January, requiring extensive surgical intervention,was brought to ED by care giver after home health attendant noted a hard mass in the abdomen.  Care giver notes patient has been constipated, having hard stools for several days, but this was improving with the use of laxatives. 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  She underwent ex-lap that include sigmoid resection, repair of fistula, and drainage of abscess.  She was eventually discharged to home health on 1/30.  She was doing fairly well until this.  Workup in ED noted grossly unremarkable blood counts and chemistries, including normal white count.  Patient afebrile and HDS.  However CT abd has noted a [id number] abdominal wall abscess.  However due to complicated nature of the patient, Hospital Medicine consulted for admission.      REVIEW OF SYSTEMS   Except as documented, all other systems reviewed and negative       PAST MEDICAL HISTORY   Essential HTN  Complicated Diverticulosis       PAST SURGICAL HISTORY   Recent Exploratory Laparotomy that included       - Sigmoid colon resection       - Repair of colo-vesicular fistula       - Drainage of abscess       - Excision of leiomyoma       - Takedown of splenic flexure  Otherwise surgical history negative      FAMILY HISTORY   Reviewed, negative in relation to patient's current condition.      SOCIAL HISTORY   Denies any history of alcohol or tobacco abuse.  Lives with son who assist her with ADLs.      ALLERGIES   NKDA      HOME MEDICATIONS     aspirin (ECOTRIN) 81 mg, Oral   carvediloL  (COREG) 6.25 mg, Oral, 2 times daily   furosemide (LASIX) 40 mg, Oral, Every morning   ketorolac (TORADOL) 10 mg, Oral, Once   lactulose (CHRONULAC) 10 gram/15 mL solution Oral   metOLazone (ZAROXOLYN) 5 mg, Oral, Daily   traZODone (DESYREL) 100 mg, Oral, Nightly   valsartan-hydrochlorothiazide (DIOVAN-HCT) 160-12.5 mg per tablet 1 tablet, Oral       PHYSICAL EXAM   VITALS: T 97.5 °F (36.4 °C)   /76   P 80   RR 16   O2 (!) 94 %    GENERAL: Awake and in NAD  HEENT: NC/AT, EOMI, PERRL.  NECK: Supple,  No JVD  LUNGS: CTA B/L  CVS: RRR, S1S2 positive  GI/: Firm mass like lesion in the left mid abdomen, TTP palpation, no fluctuance, open wound or erythema.  Otherwise Soft, NT, bowel sounds positive.  EXTREMITIES: Peripheral pulses 2+, trace LE edema  DERM: Warm, dry.  No rashes or lesions noted.  NEURO: AAOx3, no focal neurologic deficit  PSYCH: Cooperative, appropriate mood and affect       DIAGNOSTICS     Recent Labs     04/26/23  1402   WBC 9.9   RBC 3.37*   HGB 9.0*   HCT 30.9*   MCV 91.7   MCH 26.7*   MCHC 29.1*   RDW 14.5           Recent Labs     04/26/23  1402   *   K 4.6   CHLORIDE 95*   CO2 26   BUN 33.0*   CREATININE 0.97   GLUCOSE 99   CALCIUM 9.0   ALBUMIN 2.3*   GLOBULIN 4.3*   ALKPHOS 180*   ALT 16   AST 23   BILITOT 0.6   LIPASE 8            CT Abdomen Pelvis With Contrast  Narrative:   Serial axial images were obtained through the abdomen and pelvis with the administration of  IV contrast. Coronal and sagittal reconstructions where also obtained. Degenerative changes and curvature are noted to the thoracolumbar spine.  Bony structures are osteopenic.  The heart is enlarged.  Atelectasis and/or scarring is evident the lung bases.  There is beam hardening artifact due to patient body habitus.  The liver is mildly decreased in density suggesting steatosis.  There elevation of the right hemidiaphragm.  The spleen, adrenal glands, and pancreas are grossly within normal limits.  The  gallbladder is moderately distended with a Phrygian cap.  Atherosclerosis is seen within the aorta and branching vessels.  The kidneys are relatively symmetric in size.  No hydronephrosis is seen.  There is bilateral renal cortical thinning and perinephric stranding.  Round low-attenuation foci are again evident at the kidneys bilaterally suspicious for cysts which have a similar appearance to the recent exam.  No periaortic or pericaval lymphadenopathy is identified no dilated loops of bowel are seen.  Gas and feces are evident throughout the colon.  The appendix is not identified with certainty.  The cecum appears low lying in position.  The uterus is somewhat small and size.  The bladder is partially distended with fluid.  No free fluid collection is seen.  There is interim placement of an ostomy at the anterior left lower pelvis with the small localized fluid collection in the subcutaneous tissues.  There is interim development of a lobulated/septated cystic focus within the mid upper left anterior abdominal wall musculature measuring 5.0 x 7.9 x 6.4 cm.  There are surrounding hazy walled off margins suggesting a abscess formation.  There is interim resolution of previous dilated fluid-filled loops of bowel in the lower abdomen since the prior exam..  Impression:   1. Interim development of a suspect septated left anterior abdominal wall abscess measuring up to 8 cm in diameter  2. Interim resolution of previous dilated fluid-filled loops of bowel and placement of a ostomy at the anterior left lower abdomen.  There is a small fluid collection surrounding the ostomy within the subcutaneous fat  3. Findings of constipation  4. Cardiomegaly  5. Elevated right hemidiaphragm  6. Findings compatible with cysts to the kidneys bilaterally which have a similar appearance  7. Bilateral renal cortical thinning and perinephric stranding  8. Moderately distended gallbladder with Phrygian cap  9. Nonvisualization of the  appendix with low lying cecum.  A repeat exam with oral contrast would allow further evaluation if clinically indicated  Date:    04/26/2023  Time:    15:41    X-Ray Abdomen Flat And Erect  Narrative:   Upright and supine views reveal a nonspecific bowel gas pattern without evidence of obstruction. No free air is seen outside of bowel lumen. Gas and feces are seen within the colon.  A fecal bolus is present within the rectum.  Degenerative changes and curvature are noted to the thoracolumbar spine.  Bony structures are osteopenic.  Extensive atherosclerosis is seen.  Unusual curvilinear lucencies are noted to the lower abdomen and pelvis suspicious for atypical a prominent skin folds at the buttocks.  Impression:   1. No diagnostic acute abdominal abnormality identified.  2. Curvilinear lucencies at the lower abdomen and pelvis suspicious for atypical prominent skin folds at the buttocks.  Clinical correlation is indicated.  3. Atherosclerosis  4. Thoracic spondylosis, scoliosis, and osteopenia  Date:    04/26/2023  Time:    14:25        ASSESSMENT   Abd wall abscess  h/o colonic obstruction requiring surgical intervention Jan 2023  Essential HTN      PLAN   Continue antibiotics with IV Zosyn  Surgery consulted for drainage, will follow culture results if obtained  NPO pending surgical evaluation  Gentle IV hydration overnight  Hold antihypertensives  Otherwise close clinical monitoring        Prophylaxis: B/L SCDs pending surgical plan  Code Status: DNR/DNI      Norbert Rico MD  Hospital Medicine        If the patient has been admitted under observation status, it is at my discretion and under our care with hospital medicine services. [TWA]

## 2023-04-26 NOTE — Clinical Note
Diagnosis: Abdominal pain [129094]   Admitting Provider:: AMANDA RAMIREZ [[id number]]   Future Attending Provider: AMANDA RAMIREZ [[id number]]   Reason for IP Medical Treatment  (Clinical interventions that can only be accomplished in the IP setting? ) :: surgical intervention   I certify that Inpatient services for greater than or equal to 2 midnights are medically necessary:: Yes   Plans for Post-Acute care--if anticipated (pick the single best option):: A. No post acute care anticipated at this time

## 2023-04-26 NOTE — ACP (ADVANCE CARE PLANNING)
I discussed with this 89-year-old lady code status she told me I am 89 if the good Lord wants me let me go do not do anything to me    She is the wife of now  Dr. Enio Medina General practice doctor who worked in [place] for many many years.  She is well-versed in CPR and resuscitation and she said she does not want any of that

## 2023-04-27 LAB
ANION GAP SERPL CALC-SCNC: 9 MEQ/L
APPEARANCE UR: CLEAR
BACTERIA #/AREA URNS AUTO: ABNORMAL /HPF
BASOPHILS # BLD AUTO: 0.07 X10(3)/MCL (ref 0–0.2)
BASOPHILS NFR BLD AUTO: 0.7 %
BILIRUB UR QL STRIP.AUTO: NEGATIVE MG/DL
BUN SERPL-MCNC: 28 MG/DL (ref 9.8–20.1)
CALCIUM SERPL-MCNC: 8.6 MG/DL (ref 8.4–10.2)
CHLORIDE SERPL-SCNC: 96 MMOL/L (ref 98–111)
CO2 SERPL-SCNC: 24 MMOL/L (ref 23–31)
COLOR UR AUTO: YELLOW
CREAT SERPL-MCNC: 0.96 MG/DL (ref 0.55–1.02)
CREAT/UREA NIT SERPL: 29
EOSINOPHIL # BLD AUTO: 0.08 X10(3)/MCL (ref 0–0.9)
EOSINOPHIL NFR BLD AUTO: 0.8 %
ERYTHROCYTE [DISTWIDTH] IN BLOOD BY AUTOMATED COUNT: 14.2 % (ref 11.5–17)
GFR SERPLBLD CREATININE-BSD FMLA CKD-EPI: 55 MLS/MIN/1.73/M2
GLUCOSE SERPL-MCNC: 100 MG/DL (ref 75–121)
GLUCOSE UR QL STRIP.AUTO: NEGATIVE MG/DL
HCT VFR BLD AUTO: 27.1 % (ref 37–47)
HGB BLD-MCNC: 8.6 G/DL (ref 12–16)
IMM GRANULOCYTES # BLD AUTO: 0.05 X10(3)/MCL (ref 0–0.04)
IMM GRANULOCYTES NFR BLD AUTO: 0.5 %
KETONES UR QL STRIP.AUTO: NEGATIVE MG/DL
LEUKOCYTE ESTERASE UR QL STRIP.AUTO: ABNORMAL UNIT/L
LYMPHOCYTES # BLD AUTO: 0.77 X10(3)/MCL (ref 0.6–4.6)
LYMPHOCYTES NFR BLD AUTO: 7.4 %
MCH RBC QN AUTO: 26.4 PG (ref 27–31)
MCHC RBC AUTO-ENTMCNC: 31.7 G/DL (ref 33–36)
MCV RBC AUTO: 83.1 FL (ref 80–94)
MONOCYTES # BLD AUTO: 0.83 X10(3)/MCL (ref 0.1–1.3)
MONOCYTES NFR BLD AUTO: 7.9 %
NEUTROPHILS # BLD AUTO: 8.67 X10(3)/MCL (ref 2.1–9.2)
NEUTROPHILS NFR BLD AUTO: 82.7 %
NITRITE UR QL STRIP.AUTO: NEGATIVE
PH UR STRIP.AUTO: 6 [PH]
PLATELET # BLD AUTO: 332 X10(3)/MCL (ref 130–400)
PMV BLD AUTO: 9.6 FL (ref 7.4–10.4)
POTASSIUM SERPL-SCNC: 4.2 MMOL/L (ref 3.5–5.1)
PROT UR QL STRIP.AUTO: NEGATIVE MG/DL
RBC # BLD AUTO: 3.26 X10(6)/MCL (ref 4.2–5.4)
RBC #/AREA URNS AUTO: ABNORMAL /HPF
RBC UR QL AUTO: NEGATIVE UNIT/L
SODIUM SERPL-SCNC: 129 MMOL/L (ref 132–146)
SP GR UR STRIP.AUTO: 1.01
SQUAMOUS #/AREA URNS AUTO: ABNORMAL /HPF
UROBILINOGEN UR STRIP-ACNC: 0.2 MG/DL
WBC # SPEC AUTO: 10.5 X10(3)/MCL (ref 4.5–11.5)
WBC #/AREA URNS AUTO: ABNORMAL /HPF

## 2023-04-27 PROCEDURE — 63600175 PHARM REV CODE 636 W HCPCS: Performed by: EMERGENCY MEDICINE

## 2023-04-27 PROCEDURE — 85025 COMPLETE CBC W/AUTO DIFF WBC: CPT | Performed by: INTERNAL MEDICINE

## 2023-04-27 PROCEDURE — 11000001 HC ACUTE MED/SURG PRIVATE ROOM

## 2023-04-27 PROCEDURE — 94761 N-INVAS EAR/PLS OXIMETRY MLT: CPT

## 2023-04-27 PROCEDURE — 80048 BASIC METABOLIC PNL TOTAL CA: CPT | Performed by: INTERNAL MEDICINE

## 2023-04-27 PROCEDURE — 25000003 PHARM REV CODE 250: Performed by: EMERGENCY MEDICINE

## 2023-04-27 PROCEDURE — 81001 URINALYSIS AUTO W/SCOPE: CPT | Performed by: NURSE PRACTITIONER

## 2023-04-27 RX ADMIN — PIPERACILLIN AND TAZOBACTAM 4.5 G: 4; .5 INJECTION, POWDER, LYOPHILIZED, FOR SOLUTION INTRAVENOUS; PARENTERAL at 04:04

## 2023-04-27 RX ADMIN — PIPERACILLIN AND TAZOBACTAM 4.5 G: 4; .5 INJECTION, POWDER, LYOPHILIZED, FOR SOLUTION INTRAVENOUS; PARENTERAL at 01:04

## 2023-04-27 RX ADMIN — PIPERACILLIN AND TAZOBACTAM 4.5 G: 4; .5 INJECTION, POWDER, LYOPHILIZED, FOR SOLUTION INTRAVENOUS; PARENTERAL at 08:04

## 2023-04-27 NOTE — PLAN OF CARE
04/27/23 1243   Discharge Assessment   Assessment Type Discharge Planning Assessment   Source of Information patient   People in Home alone   Do you expect to return to your current living situation? Yes   Do you have help at home or someone to help you manage your care at home? Yes   Who are your caregiver(s) and their phone number(s)? sitters private pay   Prior to hospitilization cognitive status: Not Oriented to Place   Current cognitive status: Not Oriented to Place   Walking or Climbing Stairs ambulation difficulty, requires equipment   Dressing/Bathing bathing difficulty, assistance 1 person   Equipment Currently Used at Home walker, rolling;wheelchair   Do you currently have service(s) that help you manage your care at home? No   Do you have prescription coverage? Yes   Do you have any problems affording any of your prescribed medications? No   Is the patient taking medications as prescribed? yes   Who is going to help you get home at discharge? sitters and fly   How do you get to doctors appointments? family or friend will provide   Are you on dialysis? No   Do you take coumadin? No   Discharge Plan A Home Health;Home with family   Discharge Plan B Home Health;Home with family   DME Needed Upon Discharge  none   Discharge Plan discussed with: Adult children   Discharge Barriers Identified None   Physical Activity   On average, how many days per week do you engage in moderate to strenuous exercise (like a brisk walk)? Patient refu   On average, how many minutes do you engage in exercise at this level? Patient refu   Financial Resource Strain   How hard is it for you to pay for the very basics like food, housing, medical care, and heating? Patient refu   Housing Stability   In the last 12 months, was there a time when you were not able to pay the mortgage or rent on time? NV   In the last 12 months, was there a time when you did not have a steady place to sleep or slept in a shelter (including now)? NV    Transportation Needs   In the past 12 months, has lack of transportation kept you from medical appointments or from getting medications? Patient refu   In the past 12 months, has lack of transportation kept you from meetings, work, or from getting things needed for daily living? Patient refu   Food Insecurity   Within the past 12 months, you worried that your food would run out before you got the money to buy more. Patient refu   Within the past 12 months, the food you bought just didn't last and you didn't have money to get more. Patient refu   Stress   Do you feel stress - tense, restless, nervous, or anxious, or unable to sleep at night because your mind is troubled all the time - these days? Patient refu   Social Connections   In a typical week, how many times do you talk on the phone with family, friends, or neighbors? Patient refu   How often do you get together with friends or relatives? Patient refu   How often do you attend Adventism or Evangelical services? Patient refu   Do you belong to any clubs or organizations such as Adventism groups, unions, fraternal or athletic groups, or school groups? Patient refu   How often do you attend meetings of the clubs or organizations you belong to? Patient refu   Are you , , , , never , or living with a partner? Patient refu   Alcohol Use   Q1: How often do you have a drink containing alcohol? Patient refu   Q2: How many drinks containing alcohol do you have on a typical day when you are drinking? Patient refu   Q3: How often do you have six or more drinks on one occasion? Patient refu     Plan to tx to higher LOC.

## 2023-04-27 NOTE — PROGRESS NOTES
[facility] Hospital Medicine Progress Note        Chief Complaint: Inpatient Follow-up for     HPI:   89 y.o. female with a history that includes colonic obstruction back in January, requiring extensive surgical intervention,was brought to ED by care giver after home health attendant noted a hard mass in the abdomen.  Care giver notes patient has been constipated, having hard stools for several days, but this was improving with the use of laxatives. 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  She underwent ex-lap that include sigmoid resection, repair of fistula, and drainage of abscess.  She was eventually discharged to home health on 1/30.  She was doing fairly well until this.  Workup in ED noted grossly unremarkable blood counts and chemistries, including normal white count.  Patient afebrile and HDS.  However CT abd has noted a [id number] abdominal wall abscess.  However due to complicated nature of the patient, Hospital Medicine consulted for admission    April 27, 2023, no complaints, no fever, no shortness for breath, no nausea  Interval Hx:       Objective/physical exam:  General: In no acute distress, afebrile  Chest: Clear to auscultation bilaterally  Heart: RRR, +S1, S2, no appreciable murmur  Abdomen: Soft, nontender, BS +, colostomy  MSK: Warm, no lower extremity edema, no clubbing or cyanosis  Neurologic: Alert and oriented x4, Cranial nerve II-XII intact, Strength 5/5 in all 4 extremities    VITAL SIGNS: 24 HRS MIN & MAX LAST   Temp  Min: 97.5 °F (36.4 °C)  Max: 98.7 °F (37.1 °C) 98.2 °F (36.8 °C)   BP  Min: 107/66  Max: 137/77 119/70   Pulse  Min: 70  Max: 80  70   Resp  Min: 16  Max: 16 16   SpO2  Min: 93 %  Max: 97 % 96 %       Recent Labs   Lab 04/26/23  1402 04/27/23  0449   WBC 9.9 10.5   RBC 3.37* 3.26*   HGB 9.0* 8.6*   HCT 30.9*  27.1*   MCV 91.7 83.1   MCH 26.7* 26.4*   MCHC 29.1* 31.7*   RDW 14.5 14.2    332   MPV 9.7 9.6       Recent Labs   Lab 04/26/23  1402 04/27/23  0450   * 129*   K 4.6 4.2   CO2 26 24   BUN 33.0* 28.0*   CREATININE 0.97 0.96   CALCIUM 9.0 8.6   ALBUMIN 2.3*  --    ALKPHOS 180*  --    ALT 16  --    AST 23  --    BILITOT 0.6  --           Microbiology Results (last 7 days)       ** No results found for the last 168 hours. **             See below for Radiology    Scheduled Med:   piperacillin-tazobactam (ZOSYN) IVPB  4.5 g Intravenous Q8H        Continuous Infusions:   sodium chloride 0.9% 75 mL/hr at 04/26/23 2031        PRN Meds:  sodium chloride 0.9%       Assessment/Plan:    Abd wall abscess  h/o colonic obstruction requiring surgical intervention Jan 2023  Essential HTN    Continue IV Zosyn   Hydrate with normal saline 75 cc hour   Consult general surgeon  Continue SCD for DVT prophylaxis   Patient is on DNR on comfortable measures only      VTE prophylaxis:     Patient condition:  Stable/Fair/Guarded/ Serious/ Critical    Anticipated discharge and Disposition:         All diagnosis and differential diagnosis have been reviewed; assessment and plan has been documented; I have personally reviewed the labs and test results that are presently available; I have reviewed the patients medication list; I have reviewed the consulting providers response and recommendations. I have reviewed or attempted to review medical records based upon their availability    All of the patient's questions have been  addressed and answered. Patient's is agreeable to the above stated plan. I will continue to monitor closely and make adjustments to medical management as needed.  _____________________________________________________________________    Nutrition Status:    Radiology:  CT Abdomen Pelvis With Contrast  Narrative: EXAMINATION:  CT ABDOMEN PELVIS WITH CONTRAST    CLINICAL HISTORY:  Bowel obstruction suspected;,  .    TECHNIQUE:  PATIENT RADIATION DOSE: DLP(mGycm) : 767    As per PQRS measures, all CT scans at this facility used dose modulation, iterative reconstruction, and/or weight based dose adjustment when appropriate to reduce radiation dose to as low as reasonably achievable.    COMPARISON:  01/20/2023    FINDINGS:  Serial axial images were obtained through the abdomen and pelvis with the administration of  IV contrast. Coronal and sagittal reconstructions where also obtained. Degenerative changes and curvature are noted to the thoracolumbar spine.  Bony structures are osteopenic.  The heart is enlarged.  Atelectasis and/or scarring is evident the lung bases.  There is beam hardening artifact due to patient body habitus.  The liver is mildly decreased in density suggesting steatosis.  There elevation of the right hemidiaphragm.  The spleen, adrenal glands, and pancreas are grossly within normal limits.  The gallbladder is moderately distended with a Phrygian cap.  Atherosclerosis is seen within the aorta and branching vessels.  The kidneys are relatively symmetric in size.  No hydronephrosis is seen.  There is bilateral renal cortical thinning and perinephric stranding.  Round low-attenuation foci are again evident at the kidneys bilaterally suspicious for cysts which have a similar appearance to the recent exam.  No periaortic or pericaval lymphadenopathy is identified no dilated loops of bowel are seen.  Gas and feces are evident throughout the colon.  The appendix is not identified with certainty.  The cecum appears low lying in position.  The uterus is somewhat small and size.  The bladder is partially distended with fluid.  No free fluid collection is seen.  There is interim placement of an ostomy at the anterior left lower pelvis with the small localized fluid collection in the subcutaneous tissues.  There is interim development of a lobulated/septated cystic focus within the mid upper left anterior abdominal  wall musculature measuring 5.0 x 7.9 x 6.4 cm.  There are surrounding hazy walled off margins suggesting a abscess formation.  There is interim resolution of previous dilated fluid-filled loops of bowel in the lower abdomen since the prior exam..  Impression: 1. Interim development of a suspect septated left anterior abdominal wall abscess measuring up to 8 cm in diameter  2. Interim resolution of previous dilated fluid-filled loops of bowel and placement of a ostomy at the anterior left lower abdomen.  There is a small fluid collection surrounding the ostomy within the subcutaneous fat  3. Findings of constipation  4. Cardiomegaly  5. Elevated right hemidiaphragm  6. Findings compatible with cysts to the kidneys bilaterally which have a similar appearance  7. Bilateral renal cortical thinning and perinephric stranding  8. Moderately distended gallbladder with Phrygian cap  9. Nonvisualization of the appendix with low lying cecum.  A repeat exam with oral contrast would allow further evaluation if clinically indicated    Electronically signed by: David Jones  Date:    04/26/2023  Time:    15:41  X-Ray Abdomen Flat And Erect  Narrative: EXAMINATION:  XR ABDOMEN FLAT AND ERECT    CLINICAL HISTORY:  XR ABDOMEN FLAT AND ERECT, Unspecified abdominal pain.    COMPARISON:  01/23/2023    FINDINGS:  Upright and supine views reveal a nonspecific bowel gas pattern without evidence of obstruction. No free air is seen outside of bowel lumen. Gas and feces are seen within the colon.  A fecal bolus is present within the rectum.  Degenerative changes and curvature are noted to the thoracolumbar spine.  Bony structures are osteopenic.  Extensive atherosclerosis is seen.  Unusual curvilinear lucencies are noted to the lower abdomen and pelvis suspicious for atypical a prominent skin folds at the buttocks.  Impression: 1. No diagnostic acute abdominal abnormality identified.  2. Curvilinear lucencies at the lower abdomen and pelvis  suspicious for atypical prominent skin folds at the buttocks.  Clinical correlation is indicated.  3. Atherosclerosis  4. Thoracic spondylosis, scoliosis, and osteopenia    Electronically signed by: David Jones  Date:    04/26/2023  Time:    14:25      Erlinda Montero MD   04/27/2023

## 2023-04-28 ENCOUNTER — ANESTHESIA (OUTPATIENT)
Dept: SURGERY | Facility: HOSPITAL | Age: 88
DRG: 603 | End: 2023-04-28
Payer: MEDICARE

## 2023-04-28 ENCOUNTER — ANESTHESIA EVENT (OUTPATIENT)
Dept: SURGERY | Facility: HOSPITAL | Age: 88
DRG: 603 | End: 2023-04-28
Payer: MEDICARE

## 2023-04-28 LAB
ANION GAP SERPL CALC-SCNC: 11 MEQ/L
BASOPHILS # BLD AUTO: 0.07 X10(3)/MCL (ref 0–0.2)
BASOPHILS NFR BLD AUTO: 0.6 %
BUN SERPL-MCNC: 22 MG/DL (ref 9.8–20.1)
CALCIUM SERPL-MCNC: 8.5 MG/DL (ref 8.4–10.2)
CHLORIDE SERPL-SCNC: 99 MMOL/L (ref 98–111)
CO2 SERPL-SCNC: 22 MMOL/L (ref 23–31)
CREAT SERPL-MCNC: 1.04 MG/DL (ref 0.55–1.02)
CREAT/UREA NIT SERPL: 21
EOSINOPHIL # BLD AUTO: 0.07 X10(3)/MCL (ref 0–0.9)
EOSINOPHIL NFR BLD AUTO: 0.6 %
ERYTHROCYTE [DISTWIDTH] IN BLOOD BY AUTOMATED COUNT: 14.1 % (ref 11.5–17)
GFR SERPLBLD CREATININE-BSD FMLA CKD-EPI: 50 MLS/MIN/1.73/M2
GLUCOSE SERPL-MCNC: 87 MG/DL (ref 75–121)
HCT VFR BLD AUTO: 27.6 % (ref 37–47)
HGB BLD-MCNC: 8.6 G/DL (ref 12–16)
IMM GRANULOCYTES # BLD AUTO: 0.05 X10(3)/MCL (ref 0–0.04)
IMM GRANULOCYTES NFR BLD AUTO: 0.4 %
LYMPHOCYTES # BLD AUTO: 0.72 X10(3)/MCL (ref 0.6–4.6)
LYMPHOCYTES NFR BLD AUTO: 6.3 %
MCH RBC QN AUTO: 26.3 PG (ref 27–31)
MCHC RBC AUTO-ENTMCNC: 31.2 G/DL (ref 33–36)
MCV RBC AUTO: 84.4 FL (ref 80–94)
MONOCYTES # BLD AUTO: 0.83 X10(3)/MCL (ref 0.1–1.3)
MONOCYTES NFR BLD AUTO: 7.2 %
NEUTROPHILS # BLD AUTO: 9.78 X10(3)/MCL (ref 2.1–9.2)
NEUTROPHILS NFR BLD AUTO: 84.9 %
PLATELET # BLD AUTO: 355 X10(3)/MCL (ref 130–400)
PMV BLD AUTO: 9.6 FL (ref 7.4–10.4)
POTASSIUM SERPL-SCNC: 4.1 MMOL/L (ref 3.5–5.1)
RBC # BLD AUTO: 3.27 X10(6)/MCL (ref 4.2–5.4)
SODIUM SERPL-SCNC: 132 MMOL/L (ref 132–146)
WBC # SPEC AUTO: 11.5 X10(3)/MCL (ref 4.5–11.5)

## 2023-04-28 PROCEDURE — 85025 COMPLETE CBC W/AUTO DIFF WBC: CPT | Performed by: INTERNAL MEDICINE

## 2023-04-28 PROCEDURE — 11000001 HC ACUTE MED/SURG PRIVATE ROOM

## 2023-04-28 PROCEDURE — 94761 N-INVAS EAR/PLS OXIMETRY MLT: CPT

## 2023-04-28 PROCEDURE — D9220A PRA ANESTHESIA: ICD-10-PCS | Mod: ,,, | Performed by: NURSE ANESTHETIST, CERTIFIED REGISTERED

## 2023-04-28 PROCEDURE — 37000009 HC ANESTHESIA EA ADD 15 MINS: Performed by: SURGERY

## 2023-04-28 PROCEDURE — 87075 CULTR BACTERIA EXCEPT BLOOD: CPT | Performed by: SURGERY

## 2023-04-28 PROCEDURE — 25000003 PHARM REV CODE 250: Performed by: SURGERY

## 2023-04-28 PROCEDURE — D9220A PRA ANESTHESIA: Mod: ,,, | Performed by: NURSE ANESTHETIST, CERTIFIED REGISTERED

## 2023-04-28 PROCEDURE — C1729 CATH, DRAINAGE: HCPCS | Performed by: SURGERY

## 2023-04-28 PROCEDURE — 25000003 PHARM REV CODE 250: Performed by: INTERNAL MEDICINE

## 2023-04-28 PROCEDURE — 87205 SMEAR GRAM STAIN: CPT | Performed by: SURGERY

## 2023-04-28 PROCEDURE — 63600175 PHARM REV CODE 636 W HCPCS: Performed by: EMERGENCY MEDICINE

## 2023-04-28 PROCEDURE — 36000705 HC OR TIME LEV I EA ADD 15 MIN: Performed by: SURGERY

## 2023-04-28 PROCEDURE — 25000003 PHARM REV CODE 250: Performed by: NURSE ANESTHETIST, CERTIFIED REGISTERED

## 2023-04-28 PROCEDURE — 63600175 PHARM REV CODE 636 W HCPCS: Performed by: NURSE ANESTHETIST, CERTIFIED REGISTERED

## 2023-04-28 PROCEDURE — 36000704 HC OR TIME LEV I 1ST 15 MIN: Performed by: SURGERY

## 2023-04-28 PROCEDURE — 25000003 PHARM REV CODE 250: Performed by: EMERGENCY MEDICINE

## 2023-04-28 PROCEDURE — 80048 BASIC METABOLIC PNL TOTAL CA: CPT | Performed by: INTERNAL MEDICINE

## 2023-04-28 PROCEDURE — 37000008 HC ANESTHESIA 1ST 15 MINUTES: Performed by: SURGERY

## 2023-04-28 PROCEDURE — 87070 CULTURE OTHR SPECIMN AEROBIC: CPT | Performed by: SURGERY

## 2023-04-28 RX ORDER — FAMOTIDINE 20 MG/1
20 TABLET, FILM COATED ORAL DAILY
Status: DISCONTINUED | OUTPATIENT
Start: 2023-04-29 | End: 2023-05-05 | Stop reason: HOSPADM

## 2023-04-28 RX ORDER — LIDOCAINE HYDROCHLORIDE 20 MG/ML
INJECTION, SOLUTION EPIDURAL; INFILTRATION; INTRACAUDAL; PERINEURAL
Status: DISCONTINUED | OUTPATIENT
Start: 2023-04-28 | End: 2023-04-28

## 2023-04-28 RX ORDER — FENTANYL CITRATE 50 UG/ML
INJECTION, SOLUTION INTRAMUSCULAR; INTRAVENOUS
Status: DISCONTINUED | OUTPATIENT
Start: 2023-04-28 | End: 2023-04-28

## 2023-04-28 RX ORDER — PROPOFOL 10 MG/ML
VIAL (ML) INTRAVENOUS
Status: DISCONTINUED | OUTPATIENT
Start: 2023-04-28 | End: 2023-04-28

## 2023-04-28 RX ORDER — ENOXAPARIN SODIUM 100 MG/ML
40 INJECTION SUBCUTANEOUS EVERY 24 HOURS
Status: DISCONTINUED | OUTPATIENT
Start: 2023-04-29 | End: 2023-05-05 | Stop reason: HOSPADM

## 2023-04-28 RX ORDER — ADHESIVE BANDAGE
30 BANDAGE TOPICAL ONCE
Status: COMPLETED | OUTPATIENT
Start: 2023-04-28 | End: 2023-04-28

## 2023-04-28 RX ORDER — TRAZODONE HYDROCHLORIDE 50 MG/1
100 TABLET ORAL NIGHTLY
Status: DISCONTINUED | OUTPATIENT
Start: 2023-04-28 | End: 2023-05-05 | Stop reason: HOSPADM

## 2023-04-28 RX ORDER — LACTULOSE 10 G/15ML
30 SOLUTION ORAL DAILY
Status: COMPLETED | OUTPATIENT
Start: 2023-04-28 | End: 2023-04-30

## 2023-04-28 RX ORDER — HYDROMORPHONE HYDROCHLORIDE 2 MG/ML
0.5 INJECTION, SOLUTION INTRAMUSCULAR; INTRAVENOUS; SUBCUTANEOUS EVERY 6 HOURS PRN
Status: DISCONTINUED | OUTPATIENT
Start: 2023-04-28 | End: 2023-05-05 | Stop reason: HOSPADM

## 2023-04-28 RX ORDER — OXYCODONE HYDROCHLORIDE 5 MG/1
5 TABLET ORAL EVERY 6 HOURS PRN
Status: DISCONTINUED | OUTPATIENT
Start: 2023-04-28 | End: 2023-05-05 | Stop reason: HOSPADM

## 2023-04-28 RX ADMIN — TRAZODONE HYDROCHLORIDE 100 MG: 50 TABLET ORAL at 09:04

## 2023-04-28 RX ADMIN — TRAZODONE HYDROCHLORIDE 100 MG: 50 TABLET ORAL at 12:04

## 2023-04-28 RX ADMIN — PROPOFOL 50 MG: 10 INJECTION, EMULSION INTRAVENOUS at 04:04

## 2023-04-28 RX ADMIN — PIPERACILLIN AND TAZOBACTAM 4.5 G: 4; .5 INJECTION, POWDER, LYOPHILIZED, FOR SOLUTION INTRAVENOUS; PARENTERAL at 09:04

## 2023-04-28 RX ADMIN — FENTANYL CITRATE 25 MCG: 50 INJECTION, SOLUTION INTRAMUSCULAR; INTRAVENOUS at 04:04

## 2023-04-28 RX ADMIN — LIDOCAINE HYDROCHLORIDE 60 MG: 20 INJECTION, SOLUTION EPIDURAL; INFILTRATION; INTRACAUDAL; PERINEURAL at 04:04

## 2023-04-28 RX ADMIN — MAGNESIUM HYDROXIDE 2400 MG: 400 SUSPENSION ORAL at 08:04

## 2023-04-28 RX ADMIN — LACTULOSE 30 G: 20 SOLUTION ORAL at 08:04

## 2023-04-28 RX ADMIN — PIPERACILLIN AND TAZOBACTAM 4.5 G: 4; .5 INJECTION, POWDER, LYOPHILIZED, FOR SOLUTION INTRAVENOUS; PARENTERAL at 12:04

## 2023-04-28 RX ADMIN — PROPOFOL 60 MG: 10 INJECTION, EMULSION INTRAVENOUS at 04:04

## 2023-04-28 RX ADMIN — SODIUM CHLORIDE: 9 INJECTION, SOLUTION INTRAVENOUS at 04:04

## 2023-04-28 NOTE — CONSULTS
"[facility]  Hospital Medicine  Consult Note    Patient Name: Bailey Medina  MRN: [id number]  Admission Date: 4/26/2023  Hospital Length of Stay: 2 days  Attending Physician:  Dr. Norbert Rico  Primary Care Provider: Magdaleno Pruett MD           Patient information was obtained from patient and ER records.     Consults  Subjective:     Principal Problem:<principal problem not specified>    Chief Complaint:   Chief Complaint   Patient presents with    Constipation     Pt has no complaints   caretaker reports home health nurse feels pt has a knot / protrusion above colostomy and feels pt is constipated        HPI: 89F with left abdominal wall swelling here for evaluation; h/o htn, ankle surgery, recurrent diverticulitis with stricture and associated colovesical fistula requiring resection in January, 2023.  Patient and daughter have noticed a swelling to the abdominal wall superior to the colostomy.  Patient is unsure how long this has been present, but she notes "for some time".  No f/c.  No pain.  She was brought to the ER for evaluation.      Past Medical History:   Diagnosis Date    Diverticulosis     Hypertension        Past Surgical History:   Procedure Laterality Date    ABSCESS DRAINAGE N/A 1/20/2023    Procedure: DRAINAGE, ABSCESS;  Surgeon: LUANNE Cantor MD;  Location: [facility];  Service: General;  Laterality: N/A;    CLOSURE, FISTULA, COLOVESICAL N/A 1/20/2023    Procedure: CLOSURE, FISTULA, COLOVESICAL;  Surgeon: Jim Acevedo MD;  Location: [facility] OR;  Service: General;  Laterality: N/A;  Colovesical fistula at the dome of the bladder-excised and repaired.     COLECTOMY, SIGMOID N/A 1/20/2023    Procedure: COLECTOMY, SIGMOID;  Surgeon: LUANNE Cantor MD;  Location: [facility];  Service: General;  Laterality: N/A;  1. Very distended colon with extremely distended cecum with serosal tear that   was repaired at the end of the case.  All colon viable.  2. " Sigmoid colon obstruction related to diverticular stricture.  Abscess versus   donte diverticulum-drained.  3. Colovesical fistula at t    COLOSTOMY N/A 1/20/2023    Procedure: CREATION, COLOSTOMY;  Surgeon: LUANNE Cantor MD;  Location: [facility];  Service: General;  Laterality: N/A;    DISSECTION-SPLENIC FLEXURE N/A 1/20/2023    Procedure: DISSECTION-SPLENIC FLEXURE;  Surgeon: LUANNE Cantor MD;  Location[facility];  Service: General;  Laterality: N/A;  take down splenic flexure    LAPAROTOMY, EXPLORATORY N/A 1/20/2023    Procedure: LAPAROTOMY, EXPLORATORY;  Surgeon: LUANNE Cantor MD;  Location: [facility];  Service: General;  Laterality: N/A;  OPERATION:    1. Exploratory laparotomy.    2. Sigmoid colon resection.    3. Drainage of abscess.   4. Repair of fistula to the bladder.    5. Excision leiomyoma of the uterus.  6. Takedown splenic flexure.       SURGICAL REMOVAL OF LESION OF UTERUS N/A 1/20/2023    Procedure: EXCISION, LESION, UTERUS;  Surgeon: LUANNE Cantor MD;  Location: [facility];  Service: General;  Laterality: N/A;  Pedunculated leiomyoma of the uterus-excised.       Review of patient's allergies indicates:  No Known Allergies    No current facility-administered medications on file prior to encounter.     Current Outpatient Medications on File Prior to Encounter   Medication Sig    aspirin (ECOTRIN) 81 MG EC tablet Take 81 mg by mouth.    carvediloL (COREG) 6.25 MG tablet Take 6.25 mg by mouth 2 (two) times daily.    furosemide (LASIX) 40 MG tablet Take 40 mg by mouth every morning.    ketorolac (TORADOL) 10 mg tablet Take 10 mg by mouth once.    lactulose (CHRONULAC) 10 gram/15 mL solution Take by mouth.    metOLazone (ZAROXOLYN) 5 MG tablet Take 5 mg by mouth once daily.    traZODone (DESYREL) 100 MG tablet Take 100 mg by mouth every evening.    valsartan-hydrochlorothiazide (DIOVAN-HCT) 160-12.5 mg per tablet Take 1 tablet by mouth.     Family History    None        Tobacco Use    Smoking status: Never    Smokeless tobacco: Never   Substance and Sexual Activity    Alcohol use: Never    Drug use: Never    Sexual activity: Not on file     Review of Systems   Constitutional:  Negative for activity change.   HENT:  Negative for congestion.    Eyes: Negative.    Respiratory: Negative.     Cardiovascular: Negative.    Gastrointestinal:         Colostomy.  Has area of swelling/firmness superior to colostomy on left sided abdominal wall.    Endocrine: Negative.    Genitourinary: Negative.    Musculoskeletal: Negative.    Allergic/Immunologic: Negative.    Neurological: Negative.    Hematological:         Takes aspirin   Psychiatric/Behavioral: Negative.     Objective:     VS reviewed and ok    Weight: 70.6 kg (155 lb 10.3 oz)  Body mass index is 25.9 kg/m².    Physical Exam  Constitutional:       General: She is not in acute distress.     Appearance: She is not toxic-appearing.   HENT:      Head: Normocephalic and atraumatic.      Right Ear: External ear normal.      Left Ear: External ear normal.      Nose: Nose normal.   Eyes:      Extraocular Movements: Extraocular movements intact.   Cardiovascular:      Rate and Rhythm: Normal rate.      Pulses: Normal pulses.   Pulmonary:      Effort: Pulmonary effort is normal.   Abdominal:      Palpations: Abdomen is soft.      Comments: Stoma viable and productive of stool.  She has an area of induration/firmness superior to the stoma that is nontender with no skin changes.    Musculoskeletal:         General: Normal range of motion.      Cervical back: Normal range of motion and neck supple.   Skin:     General: Skin is warm and dry.   Neurological:      General: No focal deficit present.      Mental Status: She is alert and oriented to person, place, and time.       Significant Labs: All pertinent labs within the past 24 hours have been reviewed.    WB C10.5, hgb 8.6, Plt 332  Na 129, K 4.2, BUN/Cr 28/0.9  Glucose 100    Significant  Imaging: I have reviewed all pertinent imaging results/findings within the past 24 hours.  Impression:     1. Interim development of a suspect septated left anterior abdominal wall abscess measuring up to 8 cm in diameter  2. Interim resolution of previous dilated fluid-filled loops of bowel and placement of a ostomy at the anterior left lower abdomen.  There is a small fluid collection surrounding the ostomy within the subcutaneous fat  3. Findings of constipation  4. Cardiomegaly  5. Elevated right hemidiaphragm  6. Findings compatible with cysts to the kidneys bilaterally which have a similar appearance  7. Bilateral renal cortical thinning and perinephric stranding  8. Moderately distended gallbladder with Phrygian cap  9. Nonvisualization of the appendix with low lying cecum.  A repeat exam with oral contrast would allow further evaluation if clinically indicated        Electronically signed by: David Jones  Date:                                            04/26/2023  Time:                                           15:41           Exam Ended: 04/26/23 15:03           Assessment/Plan:     89F with left sided abdominal wall swelling/induration of uncertain etiology - abscess vs hematoma possible.  Arrangement has been made for drainage procedure at [facility] hopefully tomorrow afternoon.  Otherwise, continue supportive care.     VTE Risk Mitigation (From admission, onward)           Ordered     IP VTE HIGH RISK PATIENT  Once         04/26/23 1651     Place sequential compression device  Until discontinued         04/26/23 1651                    HOS POC IP DISCHARGE SUMMARY    Thank you for your consult. I appreciate the opportunity to be involved in Mrs. Medina's care.     Deana Acevedo MD  Department of Hospital Medicine   [facility]

## 2023-04-28 NOTE — PLAN OF CARE
Problem: Adult Inpatient Plan of Care  Goal: Plan of Care Review  Outcome: Ongoing, Progressing  Goal: Patient-Specific Goal (Individualized)  Outcome: Ongoing, Progressing  Goal: Absence of Hospital-Acquired Illness or Injury  Outcome: Ongoing, Progressing  Intervention: Identify and Manage Fall Risk  Flowsheets (Taken 4/28/2023 1725)  Safety Promotion/Fall Prevention: assistive device/personal item within reach  Intervention: Prevent Skin Injury  Flowsheets (Taken 4/28/2023 1725)  Body Position: position changed independently  Skin Protection: incontinence pads utilized  Intervention: Prevent and Manage VTE (Venous Thromboembolism) Risk  Flowsheets (Taken 4/28/2023 1725)  Activity Management: Rolling - L1  VTE Prevention/Management: intravenous hydration  Intervention: Prevent Infection  Flowsheets (Taken 4/28/2023 1725)  Infection Prevention: hand hygiene promoted  Goal: Optimal Comfort and Wellbeing  Outcome: Ongoing, Progressing  Intervention: Monitor Pain and Promote Comfort  Flowsheets (Taken 4/28/2023 1725)  Pain Management Interventions: care clustered  Intervention: Provide Person-Centered Care  Flowsheets (Taken 4/28/2023 1725)  Trust Relationship/Rapport: care explained  Goal: Readiness for Transition of Care  Outcome: Ongoing, Progressing     Problem: Fall Injury Risk  Goal: Absence of Fall and Fall-Related Injury  Outcome: Ongoing, Progressing  Intervention: Identify and Manage Contributors  Flowsheets (Taken 4/28/2023 1725)  Self-Care Promotion: independence encouraged  Medication Review/Management: medications reviewed  Intervention: Promote Injury-Free Environment  Flowsheets (Taken 4/28/2023 1725)  Safety Promotion/Fall Prevention: assistive device/personal item within reach

## 2023-04-28 NOTE — NURSING
BS report taken from Nicole pt resting in bed no s/s of distress or discomfort at this time post op v/s in progress CHANCE drain noted above colostomy site dressing clean dry and intact.

## 2023-04-28 NOTE — PROGRESS NOTES
[facility] Hospital Medicine Progress Note        Chief Complaint: Inpatient Follow-up for     HPI:   89 y.o. female with a history that includes colonic obstruction back in January, requiring extensive surgical intervention,was brought to ED by care giver after home health attendant noted a hard mass in the abdomen.  Care giver notes patient has been constipated, having hard stools for several days, but this was improving with the use of laxatives. 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  She underwent ex-lap that include sigmoid resection, repair of fistula, and drainage of abscess.  She was eventually discharged to home health on 1/30.  She was doing fairly well until this.  Workup in ED noted grossly unremarkable blood counts and chemistries, including normal white count.  Patient afebrile and HDS.  However CT abd has noted a [id number] abdominal wall abscess.  However due to complicated nature of the patient, Hospital Medicine consulted for admission     April 27, 2023, no complaints, no fever, no shortness for breath, no nausea    April 28, 2023, the patient is doing well, no fever, waiting for abscess drainage from Interventional Radiology  Interval Hx:       Objective/physical exam:  General: In no acute distress, afebrile  Chest: Clear to auscultation bilaterally  Heart: RRR, +S1, S2, no appreciable murmur  Abdomen: Soft, nontender, BS + colostomy in place  MSK: Warm, no lower extremity edema, no clubbing or cyanosis  Neurologic: Alert and oriented x4, Cranial nerve II-XII intact, Strength 5/5 in all 4 extremities    VITAL SIGNS: 24 HRS MIN & MAX LAST   Temp  Min: 97.7 °F (36.5 °C)  Max: 98.9 °F (37.2 °C) 98.5 °F (36.9 °C)   BP  Min: 110/75  Max: 125/73 124/74   Pulse  Min: 67  Max: 77  68   Resp  Min: 18  Max: 18 18   SpO2  Min: 93 %  Max: 97 %  97 %       Recent Labs   Lab 04/26/23  1402 04/27/23  0449 04/28/23  0408   WBC 9.9 10.5 11.5   RBC 3.37* 3.26* 3.27*   HGB 9.0* 8.6* 8.6*   HCT 30.9* 27.1* 27.6*   MCV 91.7 83.1 84.4   MCH 26.7* 26.4* 26.3*   MCHC 29.1* 31.7* 31.2*   RDW 14.5 14.2 14.1    332 355   MPV 9.7 9.6 9.6       Recent Labs   Lab 04/26/23  1402 04/27/23  0450 04/28/23  0408   * 129* 132   K 4.6 4.2 4.1   CO2 26 24 22*   BUN 33.0* 28.0* 22.0*   CREATININE 0.97 0.96 1.04*   CALCIUM 9.0 8.6 8.5   ALBUMIN 2.3*  --   --    ALKPHOS 180*  --   --    ALT 16  --   --    AST 23  --   --    BILITOT 0.6  --   --           Microbiology Results (last 7 days)       ** No results found for the last 168 hours. **             See below for Radiology    Scheduled Med:   piperacillin-tazobactam (ZOSYN) IVPB  4.5 g Intravenous Q8H    traZODone  100 mg Oral QHS        Continuous Infusions:   sodium chloride 0.9% 75 mL/hr at 04/26/23 2031        PRN Meds:  sodium chloride 0.9%       Assessment/Plan:  Abd wall abscess  h/o colonic obstruction requiring surgical intervention Jan 2023  Essential HTN     Continue IV Zosyn   Hydrate with normal saline 75 cc hour   Consult general surgeon  Consult interventional radiology for abscess drainage at [facility]   Continue SCD for DVT prophylaxis   Patient is on DNR on comfortable measures only    VTE prophylaxis:     Patient condition:  Stable/Fair/Guarded/ Serious/ Critical    Anticipated discharge and Disposition:         All diagnosis and differential diagnosis have been reviewed; assessment and plan has been documented; I have personally reviewed the labs and test results that are presently available; I have reviewed the patients medication list; I have reviewed the consulting providers response and recommendations. I have reviewed or attempted to review medical records based upon their availability    All of the patient's questions have been  addressed and answered. Patient's is agreeable  to the above stated plan. I will continue to monitor closely and make adjustments to medical management as needed.  _____________________________________________________________________    Nutrition Status:    Radiology:  CT Abdomen Pelvis With Contrast  Narrative: EXAMINATION:  CT ABDOMEN PELVIS WITH CONTRAST    CLINICAL HISTORY:  Bowel obstruction suspected;, .    TECHNIQUE:  PATIENT RADIATION DOSE: DLP(mGycm) : 767    As per PQRS measures, all CT scans at this facility used dose modulation, iterative reconstruction, and/or weight based dose adjustment when appropriate to reduce radiation dose to as low as reasonably achievable.    COMPARISON:  01/20/2023    FINDINGS:  Serial axial images were obtained through the abdomen and pelvis with the administration of  IV contrast. Coronal and sagittal reconstructions where also obtained. Degenerative changes and curvature are noted to the thoracolumbar spine.  Bony structures are osteopenic.  The heart is enlarged.  Atelectasis and/or scarring is evident the lung bases.  There is beam hardening artifact due to patient body habitus.  The liver is mildly decreased in density suggesting steatosis.  There elevation of the right hemidiaphragm.  The spleen, adrenal glands, and pancreas are grossly within normal limits.  The gallbladder is moderately distended with a Phrygian cap.  Atherosclerosis is seen within the aorta and branching vessels.  The kidneys are relatively symmetric in size.  No hydronephrosis is seen.  There is bilateral renal cortical thinning and perinephric stranding.  Round low-attenuation foci are again evident at the kidneys bilaterally suspicious for cysts which have a similar appearance to the recent exam.  No periaortic or pericaval lymphadenopathy is identified no dilated loops of bowel are seen.  Gas and feces are evident throughout the colon.  The appendix is not identified with certainty.  The cecum appears low lying in position.  The uterus is  somewhat small and size.  The bladder is partially distended with fluid.  No free fluid collection is seen.  There is interim placement of an ostomy at the anterior left lower pelvis with the small localized fluid collection in the subcutaneous tissues.  There is interim development of a lobulated/septated cystic focus within the mid upper left anterior abdominal wall musculature measuring 5.0 x 7.9 x 6.4 cm.  There are surrounding hazy walled off margins suggesting a abscess formation.  There is interim resolution of previous dilated fluid-filled loops of bowel in the lower abdomen since the prior exam..  Impression: 1. Interim development of a suspect septated left anterior abdominal wall abscess measuring up to 8 cm in diameter  2. Interim resolution of previous dilated fluid-filled loops of bowel and placement of a ostomy at the anterior left lower abdomen.  There is a small fluid collection surrounding the ostomy within the subcutaneous fat  3. Findings of constipation  4. Cardiomegaly  5. Elevated right hemidiaphragm  6. Findings compatible with cysts to the kidneys bilaterally which have a similar appearance  7. Bilateral renal cortical thinning and perinephric stranding  8. Moderately distended gallbladder with Phrygian cap  9. Nonvisualization of the appendix with low lying cecum.  A repeat exam with oral contrast would allow further evaluation if clinically indicated    Electronically signed by: David Jones  Date:    04/26/2023  Time:    15:41  X-Ray Abdomen Flat And Erect  Narrative: EXAMINATION:  XR ABDOMEN FLAT AND ERECT    CLINICAL HISTORY:  XR ABDOMEN FLAT AND ERECT, Unspecified abdominal pain.    COMPARISON:  01/23/2023    FINDINGS:  Upright and supine views reveal a nonspecific bowel gas pattern without evidence of obstruction. No free air is seen outside of bowel lumen. Gas and feces are seen within the colon.  A fecal bolus is present within the rectum.  Degenerative changes and curvature are  noted to the thoracolumbar spine.  Bony structures are osteopenic.  Extensive atherosclerosis is seen.  Unusual curvilinear lucencies are noted to the lower abdomen and pelvis suspicious for atypical a prominent skin folds at the buttocks.  Impression: 1. No diagnostic acute abdominal abnormality identified.  2. Curvilinear lucencies at the lower abdomen and pelvis suspicious for atypical prominent skin folds at the buttocks.  Clinical correlation is indicated.  3. Atherosclerosis  4. Thoracic spondylosis, scoliosis, and osteopenia    Electronically signed by: David Jones  Date:    04/26/2023  Time:    14:25      Erlinda Montero MD   04/28/2023

## 2023-04-28 NOTE — ANESTHESIA PREPROCEDURE EVALUATION
04/28/2023  Bailey Medina is a 89 y.o., female.      Pre-op Assessment    I have reviewed the Patient Summary Reports.     I have reviewed the Nursing Notes. I have reviewed the NPO Status.   I have reviewed the Medications.     Review of Systems  Anesthesia Hx:  No problems with previous Anesthesia  History of prior surgery of interest to airway management or planning: Previous anesthesia: General   Social:  Non-Smoker    Hematology/Oncology:     Oncology Normal    -- Anemia:   EENT/Dental:EENT/Dental Normal   Cardiovascular:   Hypertension CAD asymptomatic     Pulmonary:  Pulmonary Normal    Renal/:  Renal/ Normal     Hepatic/GI:  Hepatic/GI Normal    Musculoskeletal:  Musculoskeletal Normal    Neurological:  Neurology Normal    Endocrine:  Endocrine Normal    Dermatological:  Skin Normal    Psych:  Psychiatric Normal           Physical Exam  General: Well nourished, Cooperative, Alert and Oriented    Airway:  Mallampati: II   Mouth Opening: Normal  TM Distance: Normal  Tongue: Normal  Neck ROM: Normal ROM    Dental:  Intact        Anesthesia Plan  Type of Anesthesia, risks & benefits discussed:    Anesthesia Type: Gen ETT, Gen Natural Airway  Intra-op Monitoring Plan: Standard ASA Monitors  Induction:  IV  Informed Consent: Informed consent signed with the Patient and all parties understand the risks and agree with anesthesia plan.  All questions answered. Patient consented to blood products? Yes  ASA Score: 2 Emergent  Day of Surgery Review of History & Physical: I have interviewed and examined the patient. I have reviewed the patient's H&P dated: There are no significant changes.     Ready For Surgery From Anesthesia Perspective.     .

## 2023-04-28 NOTE — ANESTHESIA POSTPROCEDURE EVALUATION
Anesthesia Post Evaluation    Patient: Bailey Medina    Procedure(s) Performed: Procedure(s) (LRB):  INCISION AND DRAINAGE, ABDOMEN (N/A)    Final Anesthesia Type: general      Patient participation: Yes- Able to Participate  Level of consciousness: awake and alert and oriented  Post-procedure vital signs: reviewed and stable  Pain management: adequate  Airway patency: patent    PONV status at discharge: No PONV  Anesthetic complications: no      Cardiovascular status: stable  Respiratory status: unassisted, spontaneous ventilation and room air  Hydration status: euvolemic  Follow-up not needed.          Vitals Value Taken Time   /74 04/28/23 1239   Temp 37.1 °C (98.7 °F) 04/28/23 1239   Pulse 69 04/28/23 1239   Resp 18 04/27/23 2102   SpO2 96 % 04/28/23 1239         No case tracking events are documented in the log.      Pain/Chacorta Score: No data recorded

## 2023-04-29 LAB
ANION GAP SERPL CALC-SCNC: 10 MEQ/L
BASOPHILS # BLD AUTO: 0.06 X10(3)/MCL (ref 0–0.2)
BASOPHILS NFR BLD AUTO: 0.6 %
BUN SERPL-MCNC: 19 MG/DL (ref 9.8–20.1)
CALCIUM SERPL-MCNC: 8.3 MG/DL (ref 8.4–10.2)
CHLORIDE SERPL-SCNC: 104 MMOL/L (ref 98–111)
CO2 SERPL-SCNC: 21 MMOL/L (ref 23–31)
CREAT SERPL-MCNC: 1.14 MG/DL (ref 0.55–1.02)
CREAT/UREA NIT SERPL: 17
EOSINOPHIL # BLD AUTO: 0.11 X10(3)/MCL (ref 0–0.9)
EOSINOPHIL NFR BLD AUTO: 1 %
ERYTHROCYTE [DISTWIDTH] IN BLOOD BY AUTOMATED COUNT: 14.1 % (ref 11.5–17)
GFR SERPLBLD CREATININE-BSD FMLA CKD-EPI: 45 MLS/MIN/1.73/M2
GLUCOSE SERPL-MCNC: 81 MG/DL (ref 75–121)
HCT VFR BLD AUTO: 26.5 % (ref 37–47)
HGB BLD-MCNC: 8.3 G/DL (ref 12–16)
IMM GRANULOCYTES # BLD AUTO: 0.03 X10(3)/MCL (ref 0–0.04)
IMM GRANULOCYTES NFR BLD AUTO: 0.3 %
LYMPHOCYTES # BLD AUTO: 0.77 X10(3)/MCL (ref 0.6–4.6)
LYMPHOCYTES NFR BLD AUTO: 7.3 %
MCH RBC QN AUTO: 26.4 PG (ref 27–31)
MCHC RBC AUTO-ENTMCNC: 31.3 G/DL (ref 33–36)
MCV RBC AUTO: 84.4 FL (ref 80–94)
MONOCYTES # BLD AUTO: 0.73 X10(3)/MCL (ref 0.1–1.3)
MONOCYTES NFR BLD AUTO: 6.9 %
NEUTROPHILS # BLD AUTO: 8.92 X10(3)/MCL (ref 2.1–9.2)
NEUTROPHILS NFR BLD AUTO: 83.9 %
PLATELET # BLD AUTO: 365 X10(3)/MCL (ref 130–400)
PMV BLD AUTO: 9.7 FL (ref 7.4–10.4)
POTASSIUM SERPL-SCNC: 3.8 MMOL/L (ref 3.5–5.1)
RBC # BLD AUTO: 3.14 X10(6)/MCL (ref 4.2–5.4)
SODIUM SERPL-SCNC: 135 MMOL/L (ref 132–146)
WBC # SPEC AUTO: 10.6 X10(3)/MCL (ref 4.5–11.5)

## 2023-04-29 PROCEDURE — 85025 COMPLETE CBC W/AUTO DIFF WBC: CPT | Performed by: INTERNAL MEDICINE

## 2023-04-29 PROCEDURE — 80048 BASIC METABOLIC PNL TOTAL CA: CPT | Performed by: INTERNAL MEDICINE

## 2023-04-29 PROCEDURE — 94761 N-INVAS EAR/PLS OXIMETRY MLT: CPT

## 2023-04-29 PROCEDURE — 25000003 PHARM REV CODE 250: Performed by: SURGERY

## 2023-04-29 PROCEDURE — 11000001 HC ACUTE MED/SURG PRIVATE ROOM

## 2023-04-29 PROCEDURE — 25000003 PHARM REV CODE 250: Performed by: INTERNAL MEDICINE

## 2023-04-29 PROCEDURE — 63600175 PHARM REV CODE 636 W HCPCS: Performed by: SURGERY

## 2023-04-29 RX ADMIN — ENOXAPARIN SODIUM 40 MG: 40 INJECTION SUBCUTANEOUS at 09:04

## 2023-04-29 RX ADMIN — FAMOTIDINE 20 MG: 20 TABLET ORAL at 09:04

## 2023-04-29 RX ADMIN — LACTULOSE 30 G: 20 SOLUTION ORAL at 09:04

## 2023-04-29 RX ADMIN — SODIUM CHLORIDE: 9 INJECTION, SOLUTION INTRAVENOUS at 09:04

## 2023-04-29 RX ADMIN — TRAZODONE HYDROCHLORIDE 100 MG: 50 TABLET ORAL at 09:04

## 2023-04-29 NOTE — CONSULTS
Inpatient Nutrition Assessment    Admit Date: 4/26/2023   Total duration of encounter: 3 days     Nutrition Recommendation/Prescription     Continue current diet as tolerated   Boost Original TID (250 kcal, 10 g pro/svg)  Will attempt physical assessment at f/u    Communication of Recommendations:  EMR    Nutrition Assessment     Malnutrition Assessment/Nutrition-Focused Physical Exam    Malnutrition in the context of acute illness or injury  Degree of Malnutrition: unable to complete  Energy Intake: unable to obtain  Interpretation of Weight Loss: >7.5% in 3 months  Body Fat:unable to obtain  Area of Body Fat Loss: unable to obtain  Muscle Mass Loss: unable to obtain  Area of Muscle Mass Loss: unable to obtain  Fluid Accumulation: unable to obtain  Edema: unable to obtain   Reduced  Strength: unable to obtain  A minimum of two characteristics is recommended for diagnosis of either severe or non-severe malnutrition.    Chart Review    Reason Seen: malnutrition screening tool (MST)    Malnutrition Screening Tool Results   Have you recently lost weight without trying?: Unsure  Have you been eating poorly because of a decreased appetite?: No   MST Score: 2     Diagnosis:  Abd wall abscess--status post drainage by general surgeon April 28, 2023  h/o colonic obstruction requiring surgical intervention Jan 2023  Essential HTN    Relevant Medical History:   Past Medical History:   Diagnosis Date    Diverticulosis     Hypertension          Nutrition-Related Medications: lactulose, pepcid, IVF NS@75 mL/hr  Calorie Containing IV Medications: no significant kcals from medications at this time    Nutrition-Related Labs:  Crea 1.14(H), Ca 8.3(L), H/H 8.3/26.5(L), GFR 45    Diet/PN Order: Diet Adult Regular  Oral Supplement Order: none  Tube Feeding Order: none  Appetite/Oral Intake: poor/25-50% of meals  Factors Affecting Nutritional Intake: decreased appetite  Food/Zoroastrianism/Cultural Preferences: none reported  Food  "Allergies: none reported    Skin Integrity: drain/device(s), other (see comments) (colostomy in place; nicolas drain with dressing)  Wound(s):   Incision lt/rt abdomen.    Comments    4/29: Noted labs/meds. Pt with fair appetite. Per EMR, staff documenting pt eating ~40% x last 3 meals. Will add Boost to assist pt in meeting est nutrition needs. Noted 16% wt loss in ~3 months (if wts accurate), which is significant per ASPEN criteria.     Anthropometrics    Height: 5' 5" (165.1 cm)    Last Weight: 70.6 kg (155 lb 10.3 oz) (04/28/23 0352) Weight Method: Bed Scale  BMI (Calculated): 25.9  BMI Classification: normal (BMI 18.5-24.9)     Ideal Body Weight (IBW), Female: 125 lb     % Ideal Body Weight, Female (lb): 140 %    Usual Weight Provided By: EMR weight history    Wt Readings from Last 5 Encounters:   04/28/23 70.6 kg (155 lb 10.3 oz)   01/30/23 84.5 kg (186 lb 4.6 oz)     Weight Change(s) Since Admission:  Admit Weight: 79.4 kg (175 lb) (04/26/23 1056)      Estimated Needs     Weight Used For Calorie Calculations: 70.6 kg  Energy Calorie Requirements (kcal): 1342 kcal/kg  Energy Need Method: MSJ x 1.2 SF  Weight Used For Protein Calculations: 70.6 kg  Protein Requirements: 70.6 kg  Protein Need Method:  1.0 g/kg   Fluid Requirements (mL): 1765 mL  Estimated Fluid Requirement Method: 25 mL/kg    Enteral Nutrition    Patient not receiving enteral nutrition at this time.    Parenteral Nutrition    Patient not receiving parenteral nutrition support at this time.    Evaluation of Received Nutrient Intake    Calories: not meeting estimated needs  Protein: not meeting estimated needs    Patient Education    Not applicable.    Nutrition Diagnosis     PES: Inadequate energy intake related to current condition as evidenced by >7.5% wt loss in 3 months. (new)    Interventions/Goals     Intervention(s): general/healthful diet, commercial beverage, and collaboration with other providers  Goal: Meet greater than 75% of nutritional " needs by follow-up. (new)    Monitoring & Evaluation     Dietitian will monitor energy intake, weight, weight change, electrolyte/renal panel, and glucose/endocrine profile.  Nutrition Risk/Follow-Up: moderate (follow-up in 3-5 days)   Please consult if re-assessment needed sooner.

## 2023-04-29 NOTE — PLAN OF CARE
Pt is alert and orient x 4 at the time of assessment. Pt denies any pain. Pt is administered medications to help with bowel movements. Pt receiving iv fluids. Will continue to monitor

## 2023-04-29 NOTE — OP NOTE
[facility]                     [address] [address]    PATIENT NAME:      RADHA VALLE   YOB: 1930  CSN:               [id number]  MRN:               [id number]  ADMIT DATE:        04/26/2023 11:18:00  PHYSICIAN:         Marika Acevedo MD                          OPERATIVE REPORT      DATE OF SURGERY:        SURGEON:  Marika Acevedo MD    PREOPERATIVE DIAGNOSES:    1. Subfascial abscess versus hematoma, in need of drainage.  2. History of sigmoid colon resection with creation colostomy.  3. Diverticular stricture and abscess.  4. Repair of colovesical fistula.  5. Hypertension.  6. Advanced age.    POSTOPERATIVE DIAGNOSES:    1. Subfascial abscess.  2. History of sigmoid colon resection with creation colostomy.  3. Diverticular stricture and abscess.  4. Repair of colovesical fistula.  5. Hypertension.  6. Advanced age.    OPERATION:    1. Intraoperative ultrasound with surgeon interpretation.  2. Aspiration of subfascial abscess with an 18-gauge needle.  3. Incision and drainage of subfascial abscess with placement of drain.    OPERATIVE REPORT:  The patient was brought to the OR, placed in supine position.    Anesthesia was provided via IV sedation.  Time-out was carried out and agreed   upon after the abdomen was prepped and draped in sterile manner.  Ultrasound was   used to identify the collection that was in the left rectus sheath.  An   18-gauge needle was used to drain this under direct visualization.  There was   thick inspissated pus.  About 5 cc were sent for culture.  Otherwise, incision   was created for 3.5 cm with a 15 blade scalpel.  Subcutaneous tissue was   dissected down to fascia, which was entered and drainage of all purulence in all   directions was carried out.  The wound was irrigated copiously with sterile   saline.  A 19-Slovak round Joe drain was placed within the  abscess cavity and   secured.  The fascia medial to the drain was closed with 0 Vicryl suture.  The   subcutaneous space was kept open and packed with saline soaked Kerlix.  The   drain was secured with 2-0 nylon suture.  The patient tolerated the procedure   well.  There were no complications.        ______________________________  MD DAI Hamilton/СВЕТЛАНА  DD:  04/28/2023  Time:  04:54PM  DT:  04/28/2023  Time:  11:22PM  Job #:  [id number]    cc:   Dr. Magdaleno Pruett        OPERATIVE REPORT

## 2023-04-29 NOTE — PLAN OF CARE
Problem: Adult Inpatient Plan of Care  Goal: Plan of Care Review  Outcome: Ongoing, Progressing  Goal: Patient-Specific Goal (Individualized)  Outcome: Ongoing, Progressing  Goal: Absence of Hospital-Acquired Illness or Injury  Outcome: Ongoing, Progressing  Goal: Optimal Comfort and Wellbeing  Outcome: Ongoing, Progressing  Goal: Readiness for Transition of Care  Outcome: Ongoing, Progressing     Problem: Fall Injury Risk  Goal: Absence of Fall and Fall-Related Injury  Outcome: Ongoing, Progressing     Problem: Asthma Comorbidity  Goal: Maintenance of Asthma Control  Outcome: Ongoing, Progressing     Problem: Behavioral Health Comorbidity  Goal: Maintenance of Behavioral Health Symptom Control  Outcome: Ongoing, Progressing     Problem: COPD (Chronic Obstructive Pulmonary Disease) Comorbidity  Goal: Maintenance of COPD Symptom Control  Outcome: Ongoing, Progressing     Problem: Heart Failure Comorbidity  Goal: Maintenance of Heart Failure Symptom Control  Outcome: Ongoing, Progressing     Problem: Hypertension Comorbidity  Goal: Blood Pressure in Desired Range  Outcome: Ongoing, Progressing     Problem: Obstructive Sleep Apnea Risk or Actual Comorbidity Management  Goal: Unobstructed Breathing During Sleep  Outcome: Ongoing, Progressing     Problem: Osteoarthritis Comorbidity  Goal: Maintenance of Osteoarthritis Symptom Control  Outcome: Ongoing, Progressing     Problem: Pain Chronic (Persistent) (Comorbidity Management)  Goal: Acceptable Pain Control and Functional Ability  Outcome: Ongoing, Progressing     Problem: Seizure Disorder Comorbidity  Goal: Maintenance of Seizure Control  Outcome: Ongoing, Progressing

## 2023-04-29 NOTE — PROGRESS NOTES
[facility] Hospital Medicine Progress Note        Chief Complaint: Inpatient Follow-up for     HPI:   89 y.o. female with a history that includes colonic obstruction back in January, requiring extensive surgical intervention,was brought to ED by care giver after home health attendant noted a hard mass in the abdomen.  Care giver notes patient has been constipated, having hard stools for several days, but this was improving with the use of laxatives. 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  She underwent ex-lap that include sigmoid resection, repair of fistula, and drainage of abscess.  She was eventually discharged to home health on 1/30.  She was doing fairly well until this.  Workup in ED noted grossly unremarkable blood counts and chemistries, including normal white count.  Patient afebrile and HDS.  However CT abd has noted a [id number] abdominal wall abscess.  However due to complicated nature of the patient, Hospital Medicine consulted for admission     April 27, 2023, no complaints, no fever, no shortness for breath, no nausea     April 28, 2023, the patient is doing well, no fever, waiting for abscess drainage from Interventional Radiology    April 29, 2023, general surgeon drained left abdominal wall abscess yesterday patient is afebrile  Interval Hx:       Objective/physical exam:  General: In no acute distress, afebrile  Chest: Clear to auscultation bilaterally  Heart: RRR, +S1, S2, no appreciable murmur  Abdomen: Soft, nontender, BS +, colostomy in place, CHANCE drainage tube in place  MSK: Warm, no lower extremity edema, no clubbing or cyanosis  Neurologic: Alert and oriented x4, Cranial nerve II-XII intact, Strength 5/5 in all 4 extremities    VITAL SIGNS: 24 HRS MIN & MAX LAST   Temp  Min: 97.9 °F (36.6 °C)  Max: 98.8 °F (37.1 °C) 98.8 °F (37.1  °C)   BP  Min: 110/66  Max: 134/76 110/66   Pulse  Min: 66  Max: 93  70   Resp  Min: 20  Max: 20 20   SpO2  Min: 93 %  Max: 97 % (!) 94 %         Recent Labs   Lab 04/27/23  0449 04/28/23 0408 04/29/23 0412   WBC 10.5 11.5 10.6   RBC 3.26* 3.27* 3.14*   HGB 8.6* 8.6* 8.3*   HCT 27.1* 27.6* 26.5*   MCV 83.1 84.4 84.4   MCH 26.4* 26.3* 26.4*   MCHC 31.7* 31.2* 31.3*   RDW 14.2 14.1 14.1    355 365   MPV 9.6 9.6 9.7       Recent Labs   Lab 04/26/23  1402 04/27/23 0450 04/28/23 0408 04/29/23 0412   * 129* 132 135   K 4.6 4.2 4.1 3.8   CO2 26 24 22* 21*   BUN 33.0* 28.0* 22.0* 19.0   CREATININE 0.97 0.96 1.04* 1.14*   CALCIUM 9.0 8.6 8.5 8.3*   ALBUMIN 2.3*  --   --   --    ALKPHOS 180*  --   --   --    ALT 16  --   --   --    AST 23  --   --   --    BILITOT 0.6  --   --   --           Microbiology Results (last 7 days)       Procedure Component Value Units Date/Time    Gram Stain [[id number]] Collected: 04/28/23 1636    Order Status: Sent Specimen: Abscess from Abdomen Updated: 04/28/23 1804    Wound Culture [[id number]] Collected: 04/28/23 1636    Order Status: Sent Specimen: Abscess from Abdomen Updated: 04/28/23 1804    Anaerobic Culture [[id number]] Collected: 04/28/23 1636    Order Status: Sent Specimen: Abscess from Abdomen Updated: 04/28/23 1804             See below for Radiology    Scheduled Med:   enoxaparin  40 mg Subcutaneous Daily    famotidine  20 mg Oral Daily    lactulose  30 g Oral Daily    traZODone  100 mg Oral QHS        Continuous Infusions:   sodium chloride 0.9% 75 mL/hr at 04/28/23 1634        PRN Meds:  HYDROmorphone, oxyCODONE, sodium chloride 0.9%       Assessment/Plan:  Abd wall abscess--status post drainage by general surgeon April 28, 2023  h/o colonic obstruction requiring surgical intervention Jan 2023  Essential HTN     Continue IV Zosyn   Hydrate with normal saline 75 cc hour   Consult general surgeon  Continue SCD for DVT prophylaxis   Patient is on DNR on comfortable  measures only    VTE prophylaxis:     Patient condition:  Stable/Fair/Guarded/ Serious/ Critical    Anticipated discharge and Disposition:         All diagnosis and differential diagnosis have been reviewed; assessment and plan has been documented; I have personally reviewed the labs and test results that are presently available; I have reviewed the patients medication list; I have reviewed the consulting providers response and recommendations. I have reviewed or attempted to review medical records based upon their availability    All of the patient's questions have been  addressed and answered. Patient's is agreeable to the above stated plan. I will continue to monitor closely and make adjustments to medical management as needed.  _____________________________________________________________________    Nutrition Status:    Radiology:  CT Abdomen Pelvis With Contrast  Narrative: EXAMINATION:  CT ABDOMEN PELVIS WITH CONTRAST    CLINICAL HISTORY:  Bowel obstruction suspected;, .    TECHNIQUE:  PATIENT RADIATION DOSE: DLP(mGycm) : 767    As per PQRS measures, all CT scans at this facility used dose modulation, iterative reconstruction, and/or weight based dose adjustment when appropriate to reduce radiation dose to as low as reasonably achievable.    COMPARISON:  01/20/2023    FINDINGS:  Serial axial images were obtained through the abdomen and pelvis with the administration of  IV contrast. Coronal and sagittal reconstructions where also obtained. Degenerative changes and curvature are noted to the thoracolumbar spine.  Bony structures are osteopenic.  The heart is enlarged.  Atelectasis and/or scarring is evident the lung bases.  There is beam hardening artifact due to patient body habitus.  The liver is mildly decreased in density suggesting steatosis.  There elevation of the right hemidiaphragm.  The spleen, adrenal glands, and pancreas are grossly within normal limits.  The gallbladder is moderately distended with  a Phrygian cap.  Atherosclerosis is seen within the aorta and branching vessels.  The kidneys are relatively symmetric in size.  No hydronephrosis is seen.  There is bilateral renal cortical thinning and perinephric stranding.  Round low-attenuation foci are again evident at the kidneys bilaterally suspicious for cysts which have a similar appearance to the recent exam.  No periaortic or pericaval lymphadenopathy is identified no dilated loops of bowel are seen.  Gas and feces are evident throughout the colon.  The appendix is not identified with certainty.  The cecum appears low lying in position.  The uterus is somewhat small and size.  The bladder is partially distended with fluid.  No free fluid collection is seen.  There is interim placement of an ostomy at the anterior left lower pelvis with the small localized fluid collection in the subcutaneous tissues.  There is interim development of a lobulated/septated cystic focus within the mid upper left anterior abdominal wall musculature measuring 5.0 x 7.9 x 6.4 cm.  There are surrounding hazy walled off margins suggesting a abscess formation.  There is interim resolution of previous dilated fluid-filled loops of bowel in the lower abdomen since the prior exam..  Impression: 1. Interim development of a suspect septated left anterior abdominal wall abscess measuring up to 8 cm in diameter  2. Interim resolution of previous dilated fluid-filled loops of bowel and placement of a ostomy at the anterior left lower abdomen.  There is a small fluid collection surrounding the ostomy within the subcutaneous fat  3. Findings of constipation  4. Cardiomegaly  5. Elevated right hemidiaphragm  6. Findings compatible with cysts to the kidneys bilaterally which have a similar appearance  7. Bilateral renal cortical thinning and perinephric stranding  8. Moderately distended gallbladder with Phrygian cap  9. Nonvisualization of the appendix with low lying cecum.  A repeat exam  with oral contrast would allow further evaluation if clinically indicated    Electronically signed by: David Jones  Date:    04/26/2023  Time:    15:41  X-Ray Abdomen Flat And Erect  Narrative: EXAMINATION:  XR ABDOMEN FLAT AND ERECT    CLINICAL HISTORY:  XR ABDOMEN FLAT AND ERECT, Unspecified abdominal pain.    COMPARISON:  01/23/2023    FINDINGS:  Upright and supine views reveal a nonspecific bowel gas pattern without evidence of obstruction. No free air is seen outside of bowel lumen. Gas and feces are seen within the colon.  A fecal bolus is present within the rectum.  Degenerative changes and curvature are noted to the thoracolumbar spine.  Bony structures are osteopenic.  Extensive atherosclerosis is seen.  Unusual curvilinear lucencies are noted to the lower abdomen and pelvis suspicious for atypical a prominent skin folds at the buttocks.  Impression: 1. No diagnostic acute abdominal abnormality identified.  2. Curvilinear lucencies at the lower abdomen and pelvis suspicious for atypical prominent skin folds at the buttocks.  Clinical correlation is indicated.  3. Atherosclerosis  4. Thoracic spondylosis, scoliosis, and osteopenia    Electronically signed by: David Jones  Date:    04/26/2023  Time:    14:25      Erlinda Montero MD   04/29/2023

## 2023-04-30 LAB
GRAM STN SPEC: NORMAL
GRAM STN SPEC: NORMAL

## 2023-04-30 PROCEDURE — 25000003 PHARM REV CODE 250: Performed by: SURGERY

## 2023-04-30 PROCEDURE — 94761 N-INVAS EAR/PLS OXIMETRY MLT: CPT

## 2023-04-30 PROCEDURE — 25000003 PHARM REV CODE 250: Performed by: INTERNAL MEDICINE

## 2023-04-30 PROCEDURE — 11000001 HC ACUTE MED/SURG PRIVATE ROOM

## 2023-04-30 PROCEDURE — 63600175 PHARM REV CODE 636 W HCPCS: Performed by: SURGERY

## 2023-04-30 RX ADMIN — LACTULOSE 30 G: 20 SOLUTION ORAL at 09:04

## 2023-04-30 RX ADMIN — SODIUM CHLORIDE: 9 INJECTION, SOLUTION INTRAVENOUS at 09:04

## 2023-04-30 RX ADMIN — FAMOTIDINE 20 MG: 20 TABLET ORAL at 09:04

## 2023-04-30 RX ADMIN — ENOXAPARIN SODIUM 40 MG: 40 INJECTION SUBCUTANEOUS at 05:04

## 2023-04-30 RX ADMIN — TRAZODONE HYDROCHLORIDE 100 MG: 50 TABLET ORAL at 10:04

## 2023-04-30 NOTE — PROGRESS NOTES
[facility] Hospital Medicine Progress Note        Chief Complaint: Inpatient Follow-up for     HPI:   89 y.o. female with a history that includes colonic obstruction back in January, requiring extensive surgical intervention,was brought to ED by care giver after home health attendant noted a hard mass in the abdomen.  Care giver notes patient has been constipated, having hard stools for several days, but this was improving with the use of laxatives. 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  She underwent ex-lap that include sigmoid resection, repair of fistula, and drainage of abscess.  She was eventually discharged to home health on 1/30.  She was doing fairly well until this.  Workup in ED noted grossly unremarkable blood counts and chemistries, including normal white count.  Patient afebrile and HDS.  However CT abd has noted a [id number] abdominal wall abscess.  However due to complicated nature of the patient, Hospital Medicine consulted for admission     April 27, 2023, no complaints, no fever, no shortness for breath, no nausea     April 28, 2023, the patient is doing well, no fever, waiting for abscess drainage from Interventional Radiology     April 29, 2023, general surgeon drained left abdominal wall abscess yesterday patient is afebrile    April 30, 2023, patient is doing well, no fever, no shortness for breath  Interval Hx:       Objective/physical exam:  General: In no acute distress, afebrile  Chest: Clear to auscultation bilaterally  Heart: RRR, +S1, S2, no appreciable murmur  Abdomen: Soft, nontender, BS +, colostomy in place, CHANCE drainage tube in place  MSK: Warm, no lower extremity edema, no clubbing or cyanosis  Neurologic: Alert and oriented x4, Cranial nerve II-XII intact, Strength 5/5 in all 4 extremities    VITAL SIGNS: 24 HRS MIN &  MAX LAST   Temp  Min: 97.4 °F (36.3 °C)  Max: 98.6 °F (37 °C) 98.5 °F (36.9 °C)   BP  Min: 100/61  Max: 117/67 117/67   Pulse  Min: 70  Max: 85  70   Resp  Min: 18  Max: 20 20   SpO2  Min: 94 %  Max: 97 % 97 %       Recent Labs   Lab 04/27/23  0449 04/28/23  0408 04/29/23  0412   WBC 10.5 11.5 10.6   RBC 3.26* 3.27* 3.14*   HGB 8.6* 8.6* 8.3*   HCT 27.1* 27.6* 26.5*   MCV 83.1 84.4 84.4   MCH 26.4* 26.3* 26.4*   MCHC 31.7* 31.2* 31.3*   RDW 14.2 14.1 14.1    355 365   MPV 9.6 9.6 9.7       Recent Labs   Lab 04/26/23  1402 04/27/23 0450 04/28/23 0408 04/29/23 0412   * 129* 132 135   K 4.6 4.2 4.1 3.8   CO2 26 24 22* 21*   BUN 33.0* 28.0* 22.0* 19.0   CREATININE 0.97 0.96 1.04* 1.14*   CALCIUM 9.0 8.6 8.5 8.3*   ALBUMIN 2.3*  --   --   --    ALKPHOS 180*  --   --   --    ALT 16  --   --   --    AST 23  --   --   --    BILITOT 0.6  --   --   --           Microbiology Results (last 7 days)       Procedure Component Value Units Date/Time    Wound Culture [[id number]] Collected: 04/28/23 1636    Order Status: Completed Specimen: Abscess from Abdomen Updated: 04/30/23 0722     Wound Culture No Growth At 24 Hours    Gram Stain [[id number]] Collected: 04/28/23 1636    Order Status: Completed Specimen: Abscess from Abdomen Updated: 04/30/23 0710     GRAM STAIN Many WBC observed      Rare Gram Negative Rods    Anaerobic Culture [[id number]] Collected: 04/28/23 1636    Order Status: Sent Specimen: Abscess from Abdomen Updated: 04/28/23 1804             See below for Radiology    Scheduled Med:   enoxaparin  40 mg Subcutaneous Daily    famotidine  20 mg Oral Daily    traZODone  100 mg Oral QHS        Continuous Infusions:   sodium chloride 0.9% 75 mL/hr at 04/30/23 0913        PRN Meds:  HYDROmorphone, oxyCODONE, sodium chloride 0.9%       Assessment/Plan:  Abd wall abscess--status post drainage by general surgeon April 28, 2023  h/o colonic obstruction requiring surgical intervention on colostomy [place]  2023  Essential HTN     Continue IV Zosyn   discontinue normal saline 75 cc hour   Consult general surgeon  Check wound culture  Continue SCD for DVT prophylaxis   Patient is on DNR on comfortable measures only    VTE prophylaxis:     Patient condition:  Stable/Fair/Guarded/ Serious/ Critical    Anticipated discharge and Disposition:         All diagnosis and differential diagnosis have been reviewed; assessment and plan has been documented; I have personally reviewed the labs and test results that are presently available; I have reviewed the patients medication list; I have reviewed the consulting providers response and recommendations. I have reviewed or attempted to review medical records based upon their availability    All of the patient's questions have been  addressed and answered. Patient's is agreeable to the above stated plan. I will continue to monitor closely and make adjustments to medical management as needed.  _____________________________________________________________________    Nutrition Status:    Radiology:  CT Abdomen Pelvis With Contrast  Narrative: EXAMINATION:  CT ABDOMEN PELVIS WITH CONTRAST    CLINICAL HISTORY:  Bowel obstruction suspected;, .    TECHNIQUE:  PATIENT RADIATION DOSE: DLP(mGycm) : 767    As per PQRS measures, all CT scans at this facility used dose modulation, iterative reconstruction, and/or weight based dose adjustment when appropriate to reduce radiation dose to as low as reasonably achievable.    COMPARISON:  01/20/2023    FINDINGS:  Serial axial images were obtained through the abdomen and pelvis with the administration of  IV contrast. Coronal and sagittal reconstructions where also obtained. Degenerative changes and curvature are noted to the thoracolumbar spine.  Bony structures are osteopenic.  The heart is enlarged.  Atelectasis and/or scarring is evident the lung bases.  There is beam hardening artifact due to patient body habitus.  The liver is mildly decreased in  density suggesting steatosis.  There elevation of the right hemidiaphragm.  The spleen, adrenal glands, and pancreas are grossly within normal limits.  The gallbladder is moderately distended with a Phrygian cap.  Atherosclerosis is seen within the aorta and branching vessels.  The kidneys are relatively symmetric in size.  No hydronephrosis is seen.  There is bilateral renal cortical thinning and perinephric stranding.  Round low-attenuation foci are again evident at the kidneys bilaterally suspicious for cysts which have a similar appearance to the recent exam.  No periaortic or pericaval lymphadenopathy is identified no dilated loops of bowel are seen.  Gas and feces are evident throughout the colon.  The appendix is not identified with certainty.  The cecum appears low lying in position.  The uterus is somewhat small and size.  The bladder is partially distended with fluid.  No free fluid collection is seen.  There is interim placement of an ostomy at the anterior left lower pelvis with the small localized fluid collection in the subcutaneous tissues.  There is interim development of a lobulated/septated cystic focus within the mid upper left anterior abdominal wall musculature measuring 5.0 x 7.9 x 6.4 cm.  There are surrounding hazy walled off margins suggesting a abscess formation.  There is interim resolution of previous dilated fluid-filled loops of bowel in the lower abdomen since the prior exam..  Impression: 1. Interim development of a suspect septated left anterior abdominal wall abscess measuring up to 8 cm in diameter  2. Interim resolution of previous dilated fluid-filled loops of bowel and placement of a ostomy at the anterior left lower abdomen.  There is a small fluid collection surrounding the ostomy within the subcutaneous fat  3. Findings of constipation  4. Cardiomegaly  5. Elevated right hemidiaphragm  6. Findings compatible with cysts to the kidneys bilaterally which have a similar  appearance  7. Bilateral renal cortical thinning and perinephric stranding  8. Moderately distended gallbladder with Phrygian cap  9. Nonvisualization of the appendix with low lying cecum.  A repeat exam with oral contrast would allow further evaluation if clinically indicated    Electronically signed by: David Jones  Date:    04/26/2023  Time:    15:41  X-Ray Abdomen Flat And Erect  Narrative: EXAMINATION:  XR ABDOMEN FLAT AND ERECT    CLINICAL HISTORY:  XR ABDOMEN FLAT AND ERECT, Unspecified abdominal pain.    COMPARISON:  01/23/2023    FINDINGS:  Upright and supine views reveal a nonspecific bowel gas pattern without evidence of obstruction. No free air is seen outside of bowel lumen. Gas and feces are seen within the colon.  A fecal bolus is present within the rectum.  Degenerative changes and curvature are noted to the thoracolumbar spine.  Bony structures are osteopenic.  Extensive atherosclerosis is seen.  Unusual curvilinear lucencies are noted to the lower abdomen and pelvis suspicious for atypical a prominent skin folds at the buttocks.  Impression: 1. No diagnostic acute abdominal abnormality identified.  2. Curvilinear lucencies at the lower abdomen and pelvis suspicious for atypical prominent skin folds at the buttocks.  Clinical correlation is indicated.  3. Atherosclerosis  4. Thoracic spondylosis, scoliosis, and osteopenia    Electronically signed by: David Jones  Date:    04/26/2023  Time:    14:25      Erlinda Montero MD   04/30/2023

## 2023-04-30 NOTE — PLAN OF CARE
Problem: Adult Inpatient Plan of Care  Goal: Plan of Care Review  4/30/2023 1825 by Padmini Davis LPN  Outcome: Ongoing, Progressing  4/30/2023 1805 by Padmini Davis LPN  Outcome: Ongoing, Progressing  Goal: Patient-Specific Goal (Individualized)  4/30/2023 1825 by Padmini Davis LPN  Outcome: Ongoing, Progressing  4/30/2023 1805 by Padmini Davis LPN  Outcome: Ongoing, Progressing  Goal: Absence of Hospital-Acquired Illness or Injury  4/30/2023 1825 by Padmini Davis LPN  Outcome: Ongoing, Progressing  4/30/2023 1805 by Padmini Davis LPN  Outcome: Ongoing, Progressing  Goal: Optimal Comfort and Wellbeing  4/30/2023 1825 by Padmini Davis LPN  Outcome: Ongoing, Progressing  4/30/2023 1805 by Padmini Davis LPN  Outcome: Ongoing, Progressing  Goal: Readiness for Transition of Care  4/30/2023 1825 by Padmini Davis LPN  Outcome: Ongoing, Progressing  4/30/2023 1805 by Padmini Davis LPN  Outcome: Ongoing, Progressing

## 2023-05-01 LAB
ANION GAP SERPL CALC-SCNC: 9 MEQ/L
BASOPHILS # BLD AUTO: 0.08 X10(3)/MCL (ref 0–0.2)
BASOPHILS NFR BLD AUTO: 1.1 %
BUN SERPL-MCNC: 21 MG/DL (ref 9.8–20.1)
CALCIUM SERPL-MCNC: 8.4 MG/DL (ref 8.4–10.2)
CHLORIDE SERPL-SCNC: 108 MMOL/L (ref 98–111)
CO2 SERPL-SCNC: 19 MMOL/L (ref 23–31)
CREAT SERPL-MCNC: 1.15 MG/DL (ref 0.55–1.02)
CREAT/UREA NIT SERPL: 18
EOSINOPHIL # BLD AUTO: 0.33 X10(3)/MCL (ref 0–0.9)
EOSINOPHIL NFR BLD AUTO: 4.7 %
ERYTHROCYTE [DISTWIDTH] IN BLOOD BY AUTOMATED COUNT: 14.5 % (ref 11.5–17)
GFR SERPLBLD CREATININE-BSD FMLA CKD-EPI: 45 MLS/MIN/1.73/M2
GLUCOSE SERPL-MCNC: 97 MG/DL (ref 75–121)
HCT VFR BLD AUTO: 28.3 % (ref 37–47)
HGB BLD-MCNC: 8.4 G/DL (ref 12–16)
IMM GRANULOCYTES # BLD AUTO: 0.03 X10(3)/MCL (ref 0–0.04)
IMM GRANULOCYTES NFR BLD AUTO: 0.4 %
LYMPHOCYTES # BLD AUTO: 1.12 X10(3)/MCL (ref 0.6–4.6)
LYMPHOCYTES NFR BLD AUTO: 16 %
MCH RBC QN AUTO: 26.3 PG (ref 27–31)
MCHC RBC AUTO-ENTMCNC: 29.7 G/DL (ref 33–36)
MCV RBC AUTO: 88.7 FL (ref 80–94)
MONOCYTES # BLD AUTO: 0.5 X10(3)/MCL (ref 0.1–1.3)
MONOCYTES NFR BLD AUTO: 7.1 %
NEUTROPHILS # BLD AUTO: 4.94 X10(3)/MCL (ref 2.1–9.2)
NEUTROPHILS NFR BLD AUTO: 70.7 %
PLATELET # BLD AUTO: 358 X10(3)/MCL (ref 130–400)
PMV BLD AUTO: 10.4 FL (ref 7.4–10.4)
POTASSIUM SERPL-SCNC: 4 MMOL/L (ref 3.5–5.1)
RBC # BLD AUTO: 3.19 X10(6)/MCL (ref 4.2–5.4)
SODIUM SERPL-SCNC: 136 MMOL/L (ref 132–146)
WBC # SPEC AUTO: 7 X10(3)/MCL (ref 4.5–11.5)

## 2023-05-01 PROCEDURE — 97161 PT EVAL LOW COMPLEX 20 MIN: CPT

## 2023-05-01 PROCEDURE — 63600175 PHARM REV CODE 636 W HCPCS: Performed by: SURGERY

## 2023-05-01 PROCEDURE — 85025 COMPLETE CBC W/AUTO DIFF WBC: CPT | Performed by: INTERNAL MEDICINE

## 2023-05-01 PROCEDURE — 94761 N-INVAS EAR/PLS OXIMETRY MLT: CPT

## 2023-05-01 PROCEDURE — 80048 BASIC METABOLIC PNL TOTAL CA: CPT | Performed by: INTERNAL MEDICINE

## 2023-05-01 PROCEDURE — 11000001 HC ACUTE MED/SURG PRIVATE ROOM

## 2023-05-01 PROCEDURE — 25000003 PHARM REV CODE 250: Performed by: SURGERY

## 2023-05-01 PROCEDURE — 97530 THERAPEUTIC ACTIVITIES: CPT

## 2023-05-01 RX ORDER — POLYETHYLENE GLYCOL 3350 17 G/17G
17 POWDER, FOR SOLUTION ORAL DAILY
Status: DISCONTINUED | OUTPATIENT
Start: 2023-05-01 | End: 2023-05-05 | Stop reason: HOSPADM

## 2023-05-01 RX ADMIN — ENOXAPARIN SODIUM 40 MG: 40 INJECTION SUBCUTANEOUS at 05:05

## 2023-05-01 RX ADMIN — TRAZODONE HYDROCHLORIDE 100 MG: 50 TABLET ORAL at 08:05

## 2023-05-01 RX ADMIN — PIPERACILLIN AND TAZOBACTAM 4.5 G: 4; .5 INJECTION, POWDER, LYOPHILIZED, FOR SOLUTION INTRAVENOUS; PARENTERAL at 10:05

## 2023-05-01 RX ADMIN — POLYETHYLENE GLYCOL 3350 17 G: 17 POWDER, FOR SOLUTION ORAL at 02:05

## 2023-05-01 RX ADMIN — PIPERACILLIN AND TAZOBACTAM 4.5 G: 4; .5 INJECTION, POWDER, LYOPHILIZED, FOR SOLUTION INTRAVENOUS; PARENTERAL at 02:05

## 2023-05-01 RX ADMIN — OXYCODONE HYDROCHLORIDE 5 MG: 5 TABLET ORAL at 02:05

## 2023-05-01 RX ADMIN — FAMOTIDINE 20 MG: 20 TABLET ORAL at 08:05

## 2023-05-01 RX ADMIN — OXYCODONE HYDROCHLORIDE 5 MG: 5 TABLET ORAL at 08:05

## 2023-05-01 NOTE — PLAN OF CARE
Problem: Adult Inpatient Plan of Care  Goal: Plan of Care Review  Outcome: Ongoing, Progressing  Goal: Patient-Specific Goal (Individualized)  Outcome: Ongoing, Progressing  Goal: Absence of Hospital-Acquired Illness or Injury  Outcome: Ongoing, Progressing  Goal: Optimal Comfort and Wellbeing  Outcome: Ongoing, Progressing  Goal: Readiness for Transition of Care  Outcome: Ongoing, Progressing     Problem: Fall Injury Risk  Goal: Absence of Fall and Fall-Related Injury  Outcome: Ongoing, Progressing     Problem: Asthma Comorbidity  Goal: Maintenance of Asthma Control  Outcome: Ongoing, Progressing     Problem: Behavioral Health Comorbidity  Goal: Maintenance of Behavioral Health Symptom Control  Outcome: Ongoing, Progressing     Problem: COPD (Chronic Obstructive Pulmonary Disease) Comorbidity  Goal: Maintenance of COPD Symptom Control  Outcome: Ongoing, Progressing     Problem: Heart Failure Comorbidity  Goal: Maintenance of Heart Failure Symptom Control  Outcome: Ongoing, Progressing     Problem: Hypertension Comorbidity  Goal: Blood Pressure in Desired Range  Outcome: Ongoing, Progressing     Problem: Obstructive Sleep Apnea Risk or Actual Comorbidity Management  Goal: Unobstructed Breathing During Sleep  Outcome: Ongoing, Progressing     Problem: Osteoarthritis Comorbidity  Goal: Maintenance of Osteoarthritis Symptom Control  Outcome: Ongoing, Progressing     Problem: Pain Chronic (Persistent) (Comorbidity Management)  Goal: Acceptable Pain Control and Functional Ability  Outcome: Ongoing, Progressing     Problem: Seizure Disorder Comorbidity  Goal: Maintenance of Seizure Control  Outcome: Ongoing, Progressing     Problem: Skin Injury Risk Increased  Goal: Skin Health and Integrity  Outcome: Ongoing, Progressing

## 2023-05-01 NOTE — CONSULTS
"Inpatient Nutrition Assessment    Admit Date: 4/26/2023   Total duration of encounter: 5 days     Nutrition Recommendation/Prescription     Continue current diet as tolerated   Boost Original TID (250 kcal, 10 g pro/svg) if pt request. Pt states she "doesn't need any right now."  Will attempt physical assessment at f/u    Communication of Recommendations:  EMR    Nutrition Assessment     Malnutrition Assessment/Nutrition-Focused Physical Exam    Malnutrition in the context of acute illness or injury  Degree of Malnutrition: non-severe (moderate) malnutrition  Energy Intake: unable to obtain  Interpretation of Weight Loss: >7.5% in 3 months  Body Fat:mild depletion  Area of Body Fat Loss: orbital region   Muscle Mass Loss: mild depletion  Area of Muscle Mass Loss: temple region - temporalis muscle and clavicle bone region - pectoralis major, deltoid, trapezius muscles  Fluid Accumulation: unable to obtain  Edema: unable to obtain   Reduced  Strength: unable to obtain  A minimum of two characteristics is recommended for diagnosis of either severe or non-severe malnutrition.    Chart Review    Reason Seen: malnutrition screening tool (MST)    Malnutrition Screening Tool Results   Have you recently lost weight without trying?: Unsure  Have you been eating poorly because of a decreased appetite?: No   MST Score: 2     Diagnosis:  Abd wall abscess--status post drainage by general surgeon April 28, 2023  h/o colonic obstruction requiring surgical intervention Jan 2023  Essential HTN    Relevant Medical History:   Past Medical History:   Diagnosis Date    Diverticulosis     Hypertension          Nutrition-Related Medications: lactulose, pepcid, IVF NS@75 mL/hr  Calorie Containing IV Medications: no significant kcals from medications at this time    Nutrition-Related Labs:  Crea 1.14(H), Ca 8.3(L), H/H 8.3/26.5(L), GFR 45    Diet/PN Order: Diet Adult Regular  Oral Supplement Order: none  Tube Feeding Order: " "none  Appetite/Oral Intake: poor/25-50% of meals  Factors Affecting Nutritional Intake: decreased appetite  Food/Uatsdin/Cultural Preferences: none reported  Food Allergies: none reported    Skin Integrity: bruised (ecchymotic)  Wound(s):   Incision lt/rt abdomen.    Comments    5/1: Pt in good spirits. Reports she has no issues eating. States her appetite is good and that she eat what she wants. Noted 75% intake yesterday. States she does not need Boost at this time. Nsg reports sitter went to get pt a shrimp poboy because she didn't like what was for lunch. Labs and meds reviewed. Will continue to monitor during stay.     4/29: Noted labs/meds. Pt with fair appetite. Per EMR, staff documenting pt eating ~40% x last 3 meals. Will add Boost to assist pt in meeting est nutrition needs. Noted 16% wt loss in ~3 months (if wts accurate), which is significant per ASPEN criteria.     Anthropometrics    Height: 5' 5" (165.1 cm)    Last Weight: 70.6 kg (155 lb 10.3 oz) (04/28/23 0352) Weight Method: Bed Scale  BMI (Calculated): 25.9  BMI Classification: normal (BMI 18.5-24.9)     Ideal Body Weight (IBW), Female: 125 lb     % Ideal Body Weight, Female (lb): 140 %    Usual Weight Provided By: EMR weight history    Wt Readings from Last 5 Encounters:   04/28/23 70.6 kg (155 lb 10.3 oz)   01/30/23 84.5 kg (186 lb 4.6 oz)     Weight Change(s) Since Admission:  Admit Weight: 79.4 kg (175 lb) (04/26/23 1056)      Estimated Needs     Weight Used For Calorie Calculations: 70.6 kg  Energy Calorie Requirements (kcal): 1342 kcal/kg  Energy Need Method: MSJ x 1.2 SF  Weight Used For Protein Calculations: 70.6 kg  Protein Requirements: 70.6 kg  Protein Need Method:  1.0 g/kg   Fluid Requirements (mL): 1765 mL  Estimated Fluid Requirement Method: 25 mL/kg    Enteral Nutrition    Patient not receiving enteral nutrition at this time.    Parenteral Nutrition    Patient not receiving parenteral nutrition support at this time.    Evaluation " of Received Nutrient Intake    Calories: meeting estimated needs  Protein: meeting estimated needs    Patient Education    Not applicable.    Nutrition Diagnosis     PES: Inadequate energy intake related to current condition as evidenced by >7.5% wt loss in 3 months. (continues)    Interventions/Goals     Intervention(s): general/healthful diet, commercial beverage, and collaboration with other providers  Goal: Meet greater than 75% of nutritional needs by follow-up. (goal progressing)    Monitoring & Evaluation     Dietitian will monitor energy intake, weight, weight change, electrolyte/renal panel, and glucose/endocrine profile.  Nutrition Risk/Follow-Up: moderate (follow-up in 3-5 days)   Please consult if re-assessment needed sooner.

## 2023-05-01 NOTE — PROGRESS NOTES
Hospital Medicine  Progress Note    Patient Name: Bailey Medina  MRN: [id number]  Status: IP- Inpatient   Admission Date: 4/26/2023  Length of Stay: 5  Date of Service: 05/01/2023       CC: hospital follow-up for abd wall  abscess       SUBJECTIVE   89 y.o. female with a history that includes colonic obstruction back in January, requiring extensive surgical intervention,was brought to ED on 4/26 by care giver after home health attendant noted a hard mass in the abdomen.  Care giver notes patient has been constipated, having hard stools for several days, but this was improving with the use of laxatives. 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  She underwent ex-lap that include sigmoid resection, repair of fistula, and drainage of abscess.  She was eventually discharged to home health on 1/30.  She was doing fairly well until this.  Workup in ED noted grossly unremarkable blood counts and chemistries, including normal white count.  Patient afebrile and HDS.  However CT abd has noted a [id number] abdominal wall abscess.  However due to complicated nature of the patient, Hospital Medicine consulted for admission.  She was started on IV Zosyn, Surgery consulted for drainage.  Initially recommending IR drainage, though unable to obtain.  Eventually underwent drainage with surgery on 4/28.    Today: Patient seen and examined at bedside, and chart reviewed.  Surgical cultures remain negative to date, gram stain did note GNRs however.      MEDICATIONS   Scheduled   enoxaparin  40 mg Subcutaneous Daily    famotidine  20 mg Oral Daily    traZODone  100 mg Oral QHS     Continuous Infusions  None      PHYSICAL EXAM   VITALS: T 98.4 °F (36.9 °C)   /72   P 77   RR 18   O2 95 %    GENERAL: Awake and in NAD  LUNGS: CTA anteriorly  CVS: Normal rate  GI/: Soft, NT, bowel sounds  positive.  EXTREMITIES: No peripheral edema  NEURO: AAOx3  PSYCH: Cooperative      LABS   CBC  Recent Labs     04/29/23  0412 05/01/23  0333   WBC 10.6 7.0   RBC 3.14* 3.19*   HGB 8.3* 8.4*   HCT 26.5* 28.3*   MCV 84.4 88.7   MCH 26.4* 26.3*   MCHC 31.3* 29.7*   RDW 14.1 14.5    358     CHEM  Recent Labs     04/29/23  0412 05/01/23  0334    136   K 3.8 4.0   CHLORIDE 104 108   CO2 21* 19*   BUN 19.0 21.0*   CREATININE 1.14* 1.15*   GLUCOSE 81 97   CALCIUM 8.3* 8.4         MICROBIOLOGY     Microbiology Results (last 7 days)       Procedure Component Value Units Date/Time    Wound Culture [[id number]] Collected: 04/28/23 1636    Order Status: Completed Specimen: Abscess from Abdomen Updated: 05/01/23 0839     Wound Culture No Growth At 48 Hours    Anaerobic Culture [[id number]] Collected: 04/28/23 1636    Order Status: Completed Specimen: Abscess from Abdomen Updated: 05/01/23 0757     Anaerobe Culture No Anaerobes Isolated    Gram Stain [[id number]] Collected: 04/28/23 1636    Order Status: Completed Specimen: Abscess from Abdomen Updated: 04/30/23 0710     GRAM STAIN Many WBC observed      Rare Gram Negative Rods            ASSESSMENT   Abd wall abscess, s/p drainage by surgery 4/28  h/o colonic obstruction requiring surgical intervention/colostomy Jan 2023  Essential HTN    PLAN   Continue IV Zosyn , following cutlures  Drain management per surgery  Will consult PT to mobilize  otherwise continue current management and monitoring          Prophylaxis: SC Lovenox  Code Status: DNR/DNI      Norbert Rico MD  [facility]

## 2023-05-01 NOTE — PLAN OF CARE
Problem: Adult Inpatient Plan of Care  Goal: Plan of Care Review  5/1/2023 0626 by Brigid Davis LPN  Outcome: Ongoing, Progressing  5/1/2023 0619 by Brigid Davis LPN  Outcome: Ongoing, Progressing  Goal: Patient-Specific Goal (Individualized)  5/1/2023 0626 by Brigid Davis LPN  Outcome: Ongoing, Progressing  5/1/2023 0619 by Brigid Davis LPN  Outcome: Ongoing, Progressing  Goal: Absence of Hospital-Acquired Illness or Injury  5/1/2023 0626 by Brigid Davis LPN  Outcome: Ongoing, Progressing  5/1/2023 0619 by Brigid Davis LPN  Outcome: Ongoing, Progressing  Goal: Optimal Comfort and Wellbeing  5/1/2023 0626 by Brigid Davis LPN  Outcome: Ongoing, Progressing  5/1/2023 0619 by Brigid Davis LPN  Outcome: Ongoing, Progressing  Goal: Readiness for Transition of Care  5/1/2023 0626 by Brigid Davis LPN  Outcome: Ongoing, Progressing  5/1/2023 0619 by Brigid Davis LPN  Outcome: Ongoing, Progressing     Problem: Fall Injury Risk  Goal: Absence of Fall and Fall-Related Injury  5/1/2023 0626 by Brigid Davis LPN  Outcome: Ongoing, Progressing  5/1/2023 0619 by Brigid Davis LPN  Outcome: Ongoing, Progressing     Problem: Asthma Comorbidity  Goal: Maintenance of Asthma Control  5/1/2023 0626 by Brigid Davis LPN  Outcome: Ongoing, Progressing  5/1/2023 0619 by Brigid Davis LPN  Outcome: Ongoing, Progressing     Problem: Behavioral Health Comorbidity  Goal: Maintenance of Behavioral Health Symptom Control  5/1/2023 0626 by Brigid Davis LPN  Outcome: Ongoing, Progressing  5/1/2023 0619 by Brigid Davis LPN  Outcome: Ongoing, Progressing     Problem: COPD (Chronic Obstructive Pulmonary Disease) Comorbidity  Goal: Maintenance of COPD Symptom Control  5/1/2023 0626 by Brigid Davis LPN  Outcome: Ongoing, Progressing  5/1/2023 0619 by Brigid Davis LPN  Outcome: Ongoing, Progressing     Problem: Heart Failure Comorbidity  Goal:  Maintenance of Heart Failure Symptom Control  5/1/2023 0626 by Brigid Davis LPN  Outcome: Ongoing, Progressing  5/1/2023 0619 by Brigid Davis LPN  Outcome: Ongoing, Progressing     Problem: Hypertension Comorbidity  Goal: Blood Pressure in Desired Range  5/1/2023 0626 by Brigid Davis LPN  Outcome: Ongoing, Progressing  5/1/2023 0619 by Brigid Davis LPN  Outcome: Ongoing, Progressing     Problem: Obstructive Sleep Apnea Risk or Actual Comorbidity Management  Goal: Unobstructed Breathing During Sleep  5/1/2023 0626 by Brigid Davis LPN  Outcome: Ongoing, Progressing  5/1/2023 0619 by Brigid Davis LPN  Outcome: Ongoing, Progressing     Problem: Osteoarthritis Comorbidity  Goal: Maintenance of Osteoarthritis Symptom Control  5/1/2023 0626 by Brigid Davis LPN  Outcome: Ongoing, Progressing  5/1/2023 0619 by Brigid Davis LPN  Outcome: Ongoing, Progressing     Problem: Pain Chronic (Persistent) (Comorbidity Management)  Goal: Acceptable Pain Control and Functional Ability  5/1/2023 0626 by Brigid Davis LPN  Outcome: Ongoing, Progressing  5/1/2023 0619 by Brigid Davis LPN  Outcome: Ongoing, Progressing     Problem: Seizure Disorder Comorbidity  Goal: Maintenance of Seizure Control  5/1/2023 0626 by Brigid Davis LPN  Outcome: Ongoing, Progressing  5/1/2023 0619 by Brigid Davis LPN  Outcome: Ongoing, Progressing     Problem: Skin Injury Risk Increased  Goal: Skin Health and Integrity  5/1/2023 0626 by Brigid Davis LPN  Outcome: Ongoing, Progressing  5/1/2023 0619 by Brigid Davis LPN  Outcome: Ongoing, Progressing

## 2023-05-01 NOTE — PT/OT/SLP EVAL
Physical Therapy Evaluation    Patient Name:  Bailey Medina   MRN:  [id number]    Recommendations:     Discharge Recommendations: home with home health   Discharge Equipment Recommendations: walker, rolling, other (see comments)   Barriers to discharge: None    Assessment:     Bailey Medina is a 89 y.o. female admitted with a medical diagnosis of <principal problem not specified>.  She presents with the following impairments/functional limitations:   unable to amb due to severe pain Bknees and weakness, needs 2 ppl help to tf.    Rehab Prognosis: Fair; patient would benefit from acute skilled PT services to address these deficits and reach maximum level of function.    Recent Surgery: Procedure(s) (LRB):  INCISION AND DRAINAGE, ABDOMEN (N/A) 3 Days Post-Op    Plan:     During this hospitalization, patient to be seen 5 x/week to address the identified rehab impairments via gait training, therapeutic activities, therapeutic exercises and progress toward the following goals:    Plan of Care Expires:       Subjective     Chief Complaint: knee  pain and weakness  Patient/Family Comments/goals: to be able to amb household distances with assist  Pain/Comfort:  Pain Rating 1: 5/10  Location - Side 1: Bilateral  Location - Orientation 1: lower  Location 1: knee  Pain Addressed 1: Reposition, Cessation of Activity, Nurse notified  Pain Rating Post-Intervention 1: 5/10    Patients cultural, spiritual, Confucianist conflicts given the current situation:      Living Environment:  Lives home alone but has sitters around the clock  Prior to admission, patients level of function was Mckenna amb 50ft RW in home, min/modA tf bed to chair.  Equipment used at home: walker, rolling, other (see comments).  DME owned (not currently used): rolling walker and hospital bed.  Upon discharge, patient will have assistance from sitters around the clock.    Objective:     Communicated with nurse prior to session.  Patient found HOB elevated  with    upon PT entry to room.    General Precautions: Standard, fall, seizure  Orthopedic Precautions:    Braces:    Respiratory Status: Room air    Exams:  BLEs 3-/5 MMT    Functional Mobility:  Bed Mobility:     Supine to Sit: total assistance  Sit to Supine: of 2 persons  Transfers:     Bed to Chair: total assistance and of 2 persons with  standard walker  using  Step Transfer      AM-PAC 6 CLICK MOBILITY  Total Score:9       Treatment & Education:  Hep training intiated    Patient left HOB elevated with bed alarm on.    GOALS:   Multidisciplinary Problems       Physical Therapy Goals          Problem: Physical Therapy    Goal Priority Disciplines Outcome Goal Variances Interventions   Physical Therapy Goal     PT, PT/OT Ongoing, Progressing     Description: Goals to be met by: 5-10-23     Patient will increase functional independence with mobility by performin. Supine to sit with Moderate Assistance  2. Sit to supine with Moderate Assistance  3. Sit to stand transfer with Moderate Assistance  4. Bed to chair transfer with Moderate Assistance using Rolling Walker                       Goals to be met by: 23     Patient will increase functional independence with mobility by performin. Supine to sit with Moderate Assistance  2. Sit to supine with Moderate Assistance  3. Sit to stand transfer with Moderate Assistance  4. Bed to chair transfer with Moderate Assistance using Rolling Walker pwb rle  History:     Past Medical History:   Diagnosis Date    Diverticulosis     Hypertension        Past Surgical History:   Procedure Laterality Date    ABSCESS DRAINAGE N/A 2023    Procedure: DRAINAGE, ABSCESS;  Surgeon: LUANNE Cantor MD;  Location: [facility];  Service: General;  Laterality: N/A;    CLOSURE, FISTULA, COLOVESICAL N/A 2023    Procedure: CLOSURE, FISTULA, COLOVESICAL;  Surgeon: Jim Acevedo MD;  Location: [facility] OR;  Service: General;  Laterality: N/A;  Colovesical fistula  at the dome of the bladder-excised and repaired.     COLECTOMY, SIGMOID N/A 1/20/2023    Procedure: COLECTOMY, SIGMOID;  Surgeon: LUANNE Cantor MD;  Location: [facility] OR;  Service: General;  Laterality: N/A;  1. Very distended colon with extremely distended cecum with serosal tear that   was repaired at the end of the case.  All colon viable.  2. Sigmoid colon obstruction related to diverticular stricture.  Abscess versus   donte diverticulum-drained.  3. Colovesical fistula at t    COLOSTOMY N/A 1/20/2023    Procedure: CREATION, COLOSTOMY;  Surgeon: LUANNE Cantor MD;  Location: [facility] OR;  Service: General;  Laterality: N/A;    DISSECTION-SPLENIC FLEXURE N/A 1/20/2023    Procedure: DISSECTION-SPLENIC FLEXURE;  Surgeon: LUANNE Cantor MD;  Location: [facility] OR;  Service: General;  Laterality: N/A;  take down splenic flexure    INCISION AND DRAINAGE OF ABDOMEN N/A 4/28/2023    Procedure: INCISION AND DRAINAGE, ABDOMEN;  Surgeon: LUANNE Cantor MD;  Location: [facility] OR;  Service: General;  Laterality: N/A;    LAPAROTOMY, EXPLORATORY N/A 1/20/2023    Procedure: LAPAROTOMY, EXPLORATORY;  Surgeon: LUANNE Cantor MD;  Location: [facility] OR;  Service: General;  Laterality: N/A;  OPERATION:    1. Exploratory laparotomy.    2. Sigmoid colon resection.    3. Drainage of abscess.   4. Repair of fistula to the bladder.    5. Excision leiomyoma of the uterus.  6. Takedown splenic flexure.       SURGICAL REMOVAL OF LESION OF UTERUS N/A 1/20/2023    Procedure: EXCISION, LESION, UTERUS;  Surgeon: LUANNE Cantor MD;  Location: [facility];  Service: General;  Laterality: N/A;  Pedunculated leiomyoma of the uterus-excised.       Time Tracking:     PT Received On: 05/01/23  PT Start Time: 1300     PT Stop Time: 1330  PT Total Time (min): 30 min     Billable Minutes: Therapeutic Activity 30      05/01/2023

## 2023-05-01 NOTE — PROGRESS NOTES
Patient feeling better  I spoke with the patient's daughter on Friday - she noted that the patient had not been passing her bowels easily and this was the true reason she brought her to the hospital.  Patient was given lactulose and also MOM.  This constipation has resolved.     Patient has been eating without issue.     VS ok  A+ox3, nad  Eomi  Abdominal wound is clean.  Drain is serosanguinous/benign-appearing.  There is air/stool in stoma    Labs -   WBC 7, HGB 8.4, Plt 358  Na 136, K 4  BUN/CR 21/1.15    Cultures so far negative, gram stain with rare gram negative rods    89F with abdominal wall abscess s/p drainage this past Friday.  She also has had some constipation that seems to have resolved.  Plan for:  1) Cont drain  2) Abx - zosyn started, can likely transition to augmentin po bid when set to go home  3) Dispo planning  4) Cont wet to dry to the subcutaneous space/wound  5) Will initiate miralax daily to help ensure good bowel function when is discharged      I appreciate all help with Mrs. Medina's care.

## 2023-05-02 LAB
BACTERIA SPEC ANAEROBE CULT: NORMAL
BACTERIA SPEC CULT: NO GROWTH

## 2023-05-02 PROCEDURE — 25000003 PHARM REV CODE 250: Performed by: SURGERY

## 2023-05-02 PROCEDURE — 94761 N-INVAS EAR/PLS OXIMETRY MLT: CPT

## 2023-05-02 PROCEDURE — 63600175 PHARM REV CODE 636 W HCPCS: Performed by: SURGERY

## 2023-05-02 PROCEDURE — 11000001 HC ACUTE MED/SURG PRIVATE ROOM

## 2023-05-02 PROCEDURE — 97530 THERAPEUTIC ACTIVITIES: CPT | Mod: CQ

## 2023-05-02 RX ORDER — AMOXICILLIN AND CLAVULANATE POTASSIUM 875; 125 MG/1; MG/1
1 TABLET, FILM COATED ORAL EVERY 12 HOURS
Status: DISCONTINUED | OUTPATIENT
Start: 2023-05-02 | End: 2023-05-05 | Stop reason: HOSPADM

## 2023-05-02 RX ADMIN — POLYETHYLENE GLYCOL 3350 17 G: 17 POWDER, FOR SOLUTION ORAL at 09:05

## 2023-05-02 RX ADMIN — AMOXICILLIN AND CLAVULANATE POTASSIUM 1 TABLET: 875; 125 TABLET, COATED ORAL at 08:05

## 2023-05-02 RX ADMIN — PIPERACILLIN AND TAZOBACTAM 4.5 G: 4; .5 INJECTION, POWDER, LYOPHILIZED, FOR SOLUTION INTRAVENOUS; PARENTERAL at 01:05

## 2023-05-02 RX ADMIN — ENOXAPARIN SODIUM 40 MG: 40 INJECTION SUBCUTANEOUS at 04:05

## 2023-05-02 RX ADMIN — OXYCODONE HYDROCHLORIDE 5 MG: 5 TABLET ORAL at 04:05

## 2023-05-02 RX ADMIN — PIPERACILLIN AND TAZOBACTAM 4.5 G: 4; .5 INJECTION, POWDER, LYOPHILIZED, FOR SOLUTION INTRAVENOUS; PARENTERAL at 05:05

## 2023-05-02 RX ADMIN — TRAZODONE HYDROCHLORIDE 100 MG: 50 TABLET ORAL at 08:05

## 2023-05-02 RX ADMIN — FAMOTIDINE 20 MG: 20 TABLET ORAL at 09:05

## 2023-05-02 NOTE — PROGRESS NOTES
Hospital Medicine  Progress Note    Patient Name: Bailey Medina  MRN: [id number]  Status: IP- Inpatient   Admission Date: 4/26/2023  Length of Stay: 6  Date of Service: 05/02/2023       CC: hospital follow-up for abd wall  abscess       SUBJECTIVE   89 y.o. female with a history that includes colonic obstruction back in January, requiring extensive surgical intervention,was brought to ED on 4/26 by care giver after home health attendant noted a hard mass in the abdomen.  Care giver notes patient has been constipated, having hard stools for several days, but this was improving with the use of laxatives. 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  She underwent ex-lap that include sigmoid resection, repair of fistula, and drainage of abscess.  She was eventually discharged to home health on 1/30.  She was doing fairly well until this.  Workup in ED noted grossly unremarkable blood counts and chemistries, including normal white count.  Patient afebrile and HDS.  However CT abd has noted a [id number] abdominal wall abscess.  However due to complicated nature of the patient, Hospital Medicine consulted for admission.  She was started on IV Zosyn, Surgery consulted for drainage.  Initially recommending IR drainage, though unable to obtain.  Eventually underwent drainage with surgery on 4/28.    Today: Patient seen and examined at bedside, and chart reviewed.  Surgical cultures remain negative.  Patient remains afebrile and stable.  However PT assessment notes patient very deconditioned, require 2 people to transfer to bedside chair.  Discussed placement for ongoing therapy, which patient is agreeable to.      MEDICATIONS   Scheduled   enoxaparin  40 mg Subcutaneous Daily    famotidine  20 mg Oral Daily    piperacillin-tazobactam (ZOSYN) IVPB  4.5 g Intravenous Q8H    polyethylene glycol  17 g  Oral Daily    traZODone  100 mg Oral QHS     Continuous Infusions  None      PHYSICAL EXAM   VITALS: T 97.9 °F (36.6 °C)   /71   P 81   RR 16   O2 100 %    GENERAL: Awake and in NAD  LUNGS: CTA anteriorly  CVS: Normal rate  GI/: Soft, NT, bowel sounds positive.  EXTREMITIES: No peripheral edema  NEURO: AAOx3  PSYCH: Cooperative      LABS   CBC  Recent Labs     05/01/23  0333   WBC 7.0   RBC 3.19*   HGB 8.4*   HCT 28.3*   MCV 88.7   MCH 26.3*   MCHC 29.7*   RDW 14.5          CHEM  Recent Labs     05/01/23  0334      K 4.0   CHLORIDE 108   CO2 19*   BUN 21.0*   CREATININE 1.15*   GLUCOSE 97   CALCIUM 8.4           MICROBIOLOGY     Microbiology Results (last 7 days)       Procedure Component Value Units Date/Time    Anaerobic Culture [[id number]] Collected: 04/28/23 1636    Order Status: Completed Specimen: Abscess from Abdomen Updated: 05/02/23 1020     Anaerobe Culture No Anaerobes Isolated    Wound Culture [[id number]] Collected: 04/28/23 1636    Order Status: Completed Specimen: Abscess from Abdomen Updated: 05/02/23 0737     Wound Culture No Growth    Gram Stain [[id number]] Collected: 04/28/23 1636    Order Status: Completed Specimen: Abscess from Abdomen Updated: 04/30/23 0710     GRAM STAIN Many WBC observed      Rare Gram Negative Rods            ASSESSMENT   Abd wall abscess, s/p drainage by surgery 4/28  h/o colonic obstruction requiring surgical intervention/colostomy Jan 2023  Essential HTN    PLAN   Continue IV Zosyn  Drain management per surgery, planning to remove on outpatient basis  Continue with PT, add OT  CM consulted for placement  Otherwise continue current management and monitoring in the interim          Prophylaxis: SC Lovenox  Code Status: DNR/DNI      Norbert Rico MD  [facility]

## 2023-05-02 NOTE — PLAN OF CARE
05/02/23 1619   Discharge Reassessment   Assessment Type Discharge Planning Reassessment   Discharge Plan discussed with: Adult children   Discharge Plan A Skilled Nursing Facility   Discharge Plan B Skilled Nursing Facility   DME Needed Upon Discharge  none   Discharge Barriers Identified None   Post-Acute Status   Post-Acute Authorization Placement   Post-Acute Placement Status Referrals Sent   Discharge Delays (!) Post-Acute Set-up     Dr. Rico spoke to daughter Sahara today about SNF.  Primary nurse as well as I, spoke  to daughter in detail about SNF.  Daughter will be in tomorrow from [place].  She has agreed to have her mom evaluated for SNF at [facility].  I did call Sari Duran there and informed her of the referral going to her.  She stated that they do have a contract for private sitters to accompany patients, that will inquire them to do a background check on the sitters and a form for them to sign.  I did informed daughter Sahara of this, since she wants to continue her round the clock privately paid sitters to remain with her mother there.      Referral sent.  Notified Bailey, d/c coordinator, of need for 142.                Post-Acute LOC Review :   Presently, Bailey Medina meets  InterQual® criteria for SNF placement.            Please note, if there is a change in the patient's clinical status or treatments needed, a new review will be necessary.                         These review findings are based on national InterQual®            guidelines and information documented in the patient's [employer] EMR.            They do not substitute the provider's judgment for medical necessity.

## 2023-05-02 NOTE — PT/OT/SLP PROGRESS
Physical Therapy Treatment    Patient Name:  Bailey Medina   MRN:  [id number]    Recommendations:     Discharge Recommendations: home with home health, skilled nursing facility, rehab  Discharge Equipment Recommendations: walker, rolling, other (see comments)  Barriers to discharge:  medical condition    Assessment:     Bailey Medina is a 89 y.o. female admitted with a medical diagnosis of <principal problem not specified>.  She presents with the following impairments/functional limitations: weakness, impaired endurance, impaired functional mobility, gait instability, decreased lower extremity function, decreased safety awareness.    Pt got up to sitting at EOB with mod A. She attempted standing several times to AD however she was unable to maintain standing due to pain and weakness. She transferred to chair with max A, stand pivot transfer. Pt is weak and requiring extensive assistance for transfers. She would benefit from PT, at a SNF or rehab, prior to returning home with assistance from caregivers.     Rehab Prognosis: Good; patient would benefit from acute skilled PT services to address these deficits and reach maximum level of function.    Recent Surgery: Procedure(s) (LRB):  INCISION AND DRAINAGE, ABDOMEN (N/A) 4 Days Post-Op    Plan:     During this hospitalization, patient to be seen 5 x/week to address the identified rehab impairments via therapeutic exercises, therapeutic activities, gait training and progress toward the following goals:    Plan of Care Expires:       Subjective     Chief Complaint: weakness  Patient/Family Comments/goals: to return home  Pain/Comfort:         Objective:     Communicated with patient prior to session.  Patient found HOB elevated with   upon PT entry to room.     General Precautions: Standard, fall, seizure  Orthopedic Precautions:    Braces:    Respiratory Status: Room air     Functional Mobility:  Bed Mobility:     Supine to Sit: moderate assistance  Transfers:      Sit to Stand:  maximal assistance and of 2 persons with rolling walker  Bed to Chair: maximal assistance with  no AD  using  Stand Pivot and Squat Pivot  Balance: max A in standing, poor standing tolerance      AM-PAC 6 CLICK MOBILITY          Treatment & Education:  See above    Patient left up in chair with all lines intact and call button in reach..    GOALS:   Multidisciplinary Problems       Physical Therapy Goals       Not on file                    Time Tracking:     PT Received On: 05/02/23  PT Start Time: 1140     PT Stop Time: 1205  PT Total Time (min): 25 min     Billable Minutes: Therapeutic Activity 25    Treatment Type: Treatment  PT/PTA: PTA           05/02/2023

## 2023-05-02 NOTE — PLAN OF CARE
Problem: Adult Inpatient Plan of Care  Goal: Plan of Care Review  Outcome: Ongoing, Progressing  Goal: Patient-Specific Goal (Individualized)  Outcome: Ongoing, Progressing  Goal: Absence of Hospital-Acquired Illness or Injury  Outcome: Ongoing, Progressing  Goal: Optimal Comfort and Wellbeing  Outcome: Ongoing, Progressing  Goal: Readiness for Transition of Care  Outcome: Ongoing, Progressing     Problem: Fall Injury Risk  Goal: Absence of Fall and Fall-Related Injury  Outcome: Ongoing, Progressing     Problem: Asthma Comorbidity  Goal: Maintenance of Asthma Control  Outcome: Ongoing, Progressing     Problem: Hypertension Comorbidity  Goal: Blood Pressure in Desired Range  Outcome: Ongoing, Progressing     Problem: Pain Chronic (Persistent) (Comorbidity Management)  Goal: Acceptable Pain Control and Functional Ability  Outcome: Ongoing, Progressing     Problem: Skin Injury Risk Increased  Goal: Skin Health and Integrity  Outcome: Ongoing, Progressing     Problem: Range of Motion Impairment (Functional Deficit)  Goal: Optimal Range of Motion  Outcome: Ongoing, Progressing

## 2023-05-02 NOTE — PLAN OF CARE
Pt is alert and orient x 4 at the time of assessment. Pt's personal sitter is at bedside, but alarm is still activated. Pt denies any pain at this time. Pt still receiving fluids and abx. Colostomy and nicolas are present. Will continue to monitor

## 2023-05-03 PROCEDURE — 97530 THERAPEUTIC ACTIVITIES: CPT

## 2023-05-03 PROCEDURE — 94761 N-INVAS EAR/PLS OXIMETRY MLT: CPT

## 2023-05-03 PROCEDURE — 25000003 PHARM REV CODE 250: Performed by: SURGERY

## 2023-05-03 PROCEDURE — 11000001 HC ACUTE MED/SURG PRIVATE ROOM

## 2023-05-03 PROCEDURE — 63600175 PHARM REV CODE 636 W HCPCS: Performed by: SURGERY

## 2023-05-03 RX ADMIN — TRAZODONE HYDROCHLORIDE 100 MG: 50 TABLET ORAL at 08:05

## 2023-05-03 RX ADMIN — FAMOTIDINE 20 MG: 20 TABLET ORAL at 10:05

## 2023-05-03 RX ADMIN — ENOXAPARIN SODIUM 40 MG: 40 INJECTION SUBCUTANEOUS at 04:05

## 2023-05-03 RX ADMIN — AMOXICILLIN AND CLAVULANATE POTASSIUM 1 TABLET: 875; 125 TABLET, COATED ORAL at 10:05

## 2023-05-03 RX ADMIN — AMOXICILLIN AND CLAVULANATE POTASSIUM 1 TABLET: 875; 125 TABLET, COATED ORAL at 08:05

## 2023-05-03 RX ADMIN — POLYETHYLENE GLYCOL 3350 17 G: 17 POWDER, FOR SOLUTION ORAL at 10:05

## 2023-05-03 NOTE — PT/OT/SLP PROGRESS
Physical Therapy Treatment    Patient Name:  Bailey Medina   MRN:  [id number]    Recommendations:     Discharge Recommendations: home with home health  Discharge Equipment Recommendations: walker, rolling, other (see comments)  Barriers to discharge: Decreased caregiver support    Assessment:     Bailey Medina is a 89 y.o. female admitted with a medical diagnosis of <principal problem not specified>.  She presents with the following impairments/functional limitations: weakness, impaired endurance, impaired functional mobility, gait instability, decreased lower extremity function, decreased safety awareness and knee pain.    Rehab Prognosis: Fair; patient would benefit from acute skilled PT services to address these deficits and reach maximum level of function.    Recent Surgery: Procedure(s) (LRB):  INCISION AND DRAINAGE, ABDOMEN (N/A) 5 Days Post-Op    Plan:     During this hospitalization, patient to be seen 5 x/week to address the identified rehab impairments via therapeutic activities and progress toward the following goals:    Plan of Care Expires:       Subjective     Chief Complaint: Pt requests to train how to get from chair to bed, so PT returned for training.  Pt c/o her knees hurt and she is very tired from sitting up in chair.  Patient/Family Comments/goals: to return home with sitter able to care for her  Pain/Comfort:         Objective:     Communicated with nursing prior to session.  Patient found up in chair with   upon PT entry to room.     General Precautions: Standard, fall, seizure  Orthopedic Precautions:    Braces:    Respiratory Status: Room air     Functional Mobility:  Transfers:     Bed to Chair: maximal assistance and of 2 persons with  standard walker  using  Stand Pivot      AM-PAC 6 CLICK MOBILITY          Treatment & Education:  Hand placement for tfs    Patient left HOB elevated with call button in reach..    GOALS:   Multidisciplinary Problems       Physical Therapy Goals           Problem: Physical Therapy    Goal Priority Disciplines Outcome Goal Variances Interventions   Physical Therapy Goal     PT, PT/OT Ongoing, Progressing     Description: Goals to be met by: 5-10-23     Patient will increase functional independence with mobility by performin. Supine to sit with Moderate Assistance  2. Sit to supine with Moderate Assistance  3. Sit to stand transfer with Moderate Assistance  4. Bed to chair transfer with Moderate Assistance using Rolling Walker                         Time Tracking:     PT Received On: 23  PT Start Time: 1500     PT Stop Time: 1515  PT Total Time (min): 15 min     Billable Minutes: Therapeutic Activity 15    Treatment Type: Treatment  PT/PTA: PT           2023

## 2023-05-03 NOTE — PLAN OF CARE
Pt is alert and orient x 4 at the time of assessment. Pt denies any pain at this time. [place] drain is still in place. Sitter is at bedside. Pt did sit in chair today but was very weak. Abx was switched from iv to oral. Will continue to monitor

## 2023-05-03 NOTE — PROGRESS NOTES
Patient feeling better  I spoke with the patient's daughter on Friday - she noted that the patient had not been passing her bowels easily and this was the true reason she brought her to the hospital.  Patient was given lactulose and also MOM.  This constipation has resolved.     Patient has been eating without issue.     VS ok  A+ox3, nad  Eomi  Abdominal wound is clean.  Drain is serosanguinous/benign-appearing.  There is air/stool in stoma    Labs -   WBC 7, HGB 8.4, Plt 358  Na 136, K 4  BUN/CR 21/1.15    Cultures so far negative, gram stain with rare gram negative rods    89F with abdominal wall abscess s/p drainage this past Friday.  She also has had some constipation that seems to have resolved.  Plan for:  1) Cont drain  2) Abx - zosyn started, can likely transition to augmentin po bid when set to go home  3) Dispo planning  4) Cont wet to dry to the subcutaneous space/wound  5) Will initiate miralax daily to help ensure good bowel function when is discharged      I appreciate all help with Mrs. Medina's care.    05/02/2023  As noted above patient is in stable condition without any issues.  My plan is to continue supportive care.

## 2023-05-03 NOTE — PROGRESS NOTES
Hospital Medicine  Progress Note    Patient Name: Bailey Medina  MRN: [id number]  Status: IP- Inpatient   Admission Date: 4/26/2023  Length of Stay: 7  Date of Service: 05/03/2023       CC: hospital follow-up for abd wall  abscess       SUBJECTIVE   89 y.o. female with a history that includes colonic obstruction back in January, requiring extensive surgical intervention,was brought to ED on 4/26 by care giver after home health attendant noted a hard mass in the abdomen.  Care giver notes patient has been constipated, having hard stools for several days, but this was improving with the use of laxatives. 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  She underwent ex-lap that include sigmoid resection, repair of fistula, and drainage of abscess.  She was eventually discharged to home health on 1/30.  She was doing fairly well until this.  Workup in ED noted grossly unremarkable blood counts and chemistries, including normal white count.  Patient afebrile and HDS.  However CT abd has noted a [id number] abdominal wall abscess.  However due to complicated nature of the patient, Hospital Medicine consulted for admission.  She was started on IV Zosyn, Surgery consulted for drainage.  Initially recommending IR drainage, though unable to obtain.  Eventually underwent drainage with surgery on 4/28.  Surgical cultures remained negative.  Patient remained afebrile and stable.  However PT assessment noted patient very deconditioned, requirig 2 people transfers.  Placement discussed,  patient agreeable, CM consulted    Today: Patient seen and examined at bedside, and chart reviewed.  No new issues or complaints.    MEDICATIONS   Scheduled   amoxicillin-clavulanate 875-125mg  1 tablet Oral Q12H    enoxaparin  40 mg Subcutaneous Daily    famotidine  20 mg Oral Daily    polyethylene glycol  17 g Oral Daily     traZODone  100 mg Oral QHS     Continuous Infusions  None      PHYSICAL EXAM   VITALS: T 98.6 °F (37 °C)   /72   P 80   RR 18   O2 97 %    GENERAL: Awake and in NAD  LUNGS: CTA anteriorly  CVS: Normal rate  GI/: Soft, NT, bowel sounds positive.  EXTREMITIES: No peripheral edema  NEURO: AAOx3  PSYCH: Cooperative      LABS   CBC  Recent Labs     05/01/23  0333   WBC 7.0   RBC 3.19*   HGB 8.4*   HCT 28.3*   MCV 88.7   MCH 26.3*   MCHC 29.7*   RDW 14.5        CHEM  Recent Labs     05/01/23  0334      K 4.0   CHLORIDE 108   CO2 19*   BUN 21.0*   CREATININE 1.15*   GLUCOSE 97   CALCIUM 8.4         MICROBIOLOGY     Microbiology Results (last 7 days)       Procedure Component Value Units Date/Time    Anaerobic Culture [[id number]] Collected: 04/28/23 1636    Order Status: Completed Specimen: Abscess from Abdomen Updated: 05/02/23 1020     Anaerobe Culture No Anaerobes Isolated    Wound Culture [[id number]] Collected: 04/28/23 1636    Order Status: Completed Specimen: Abscess from Abdomen Updated: 05/02/23 0737     Wound Culture No Growth    Gram Stain [[id number]] Collected: 04/28/23 1636    Order Status: Completed Specimen: Abscess from Abdomen Updated: 04/30/23 0710     GRAM STAIN Many WBC observed      Rare Gram Negative Rods            ASSESSMENT   Abd wall abscess, s/p drainage by surgery 4/28  h/o colonic obstruction requiring surgical intervention/colostomy Jan 2023  Essential HTN    PLAN   Continue IV Zosyn  Drain management per surgery, planning to remove on outpatient basis  Continue with PT/OT  CM working on SNF placement in [place]  Otherwise continue current management and monitoring in the interim          Prophylaxis: [place] Lovenox  Code Status: DNR/DNI      Norbert Rico MD  [facility]

## 2023-05-03 NOTE — PROGRESS NOTES
Feeling well and better  Awaiting dispo    Vs ok  A+ox3, nad  Eomi  Abd soft, drain in place with benign, serosanguinous output, stoma productive of stool    89F with rectus sheath abscess (Sterile) and constipation - both better s/p treatment.  Plan for:  1) Discharge when able once dispo arranged  2) Cont supportive care    Appreciate all help with Mrs. Medina's care.

## 2023-05-03 NOTE — PT/OT/SLP PROGRESS
Physical Therapy Treatment    Patient Name:  Bailey Medina   MRN:  [id number]    Recommendations:     Discharge Recommendations: home with home health, skilled nursing facility, rehab  Discharge Equipment Recommendations: walker, rolling, other (see comments)  Barriers to discharge:  medical condition    Assessment:     Bailey Medina is a 89 y.o. female admitted with a medical diagnosis of <principal problem not specified>.  She presents with the following impairments/functional limitations: weakness, impaired endurance, impaired functional mobility, gait instability, decreased lower extremity function, decreased safety awareness.    Pt had much improved tolerance to PT today. She was able to stand to RW with mod-max A and step over to recliner. She did complain of increase pain and fatigued quickly but overall showed improved strength.    Rehab Prognosis: Good; patient would benefit from acute skilled PT services to address these deficits and reach maximum level of function.    Recent Surgery: Procedure(s) (LRB):  INCISION AND DRAINAGE, ABDOMEN (N/A) 5 Days Post-Op    Plan:     During this hospitalization, patient to be seen 5 x/week to address the identified rehab impairments via therapeutic exercises, therapeutic activities, gait training and progress toward the following goals:    Plan of Care Expires:       Subjective     Chief Complaint: weakness  Patient/Family Comments/goals: to return home  Pain/Comfort:         Objective:     Communicated with patient prior to session.  Patient found HOB elevated with peripheral IV upon PT entry to room.     General Precautions: Standard, fall, seizure  Orthopedic Precautions:    Braces:    Respiratory Status: Room air     Functional Mobility:  Bed Mobility:     Supine to Sit: moderate assistance  Transfers:     Sit to Stand:  moderate assistance and maximal assistance with rolling walker  Bed to Chair: moderate assistance and maximal assistance with  rolling walker   using  Step Transfer  Balance: mod-max A in supported standing      AM-PAC 6 CLICK MOBILITY          Treatment & Education:  See above    Patient left up in chair with all lines intact and call button in reach..    GOALS:   Multidisciplinary Problems       Physical Therapy Goals          Problem: Physical Therapy    Goal Priority Disciplines Outcome Goal Variances Interventions   Physical Therapy Goal     PT, PT/OT Ongoing, Progressing                         Time Tracking:     PT Received On: 05/03/23  PT Start Time: 0915  PT Stop Time: 0945  PT Total Time (min): 30 min     Billable Minutes: Therapeutic Activity 30    Treatment Type: Treatment  PT/PTA: PTA           05/03/2023

## 2023-05-03 NOTE — PROGRESS NOTES
No complaints  Feeling well  Awaiting SNF    Vs ok  A+ox3, nad  Eomi  Abd soft, drain serosanguinous/benign, wound clean  No ttp throughout  Stoma viable and productive of air/stool       89F with abdominal wall abscess s/p drainage this past Friday.  She also has had some constipation that seems to have resolved.  Culture no growth, so sterile abscess suspected.     Plan for:  1) Cont drain  2) Change abx to augmentin po bid  3) Dispo planning  4) Cont wet to dry to the subcutaneous space/wound       I appreciate all help with Mrs. Medina's care.

## 2023-05-04 PROCEDURE — 11000001 HC ACUTE MED/SURG PRIVATE ROOM

## 2023-05-04 PROCEDURE — 63600175 PHARM REV CODE 636 W HCPCS: Performed by: SURGERY

## 2023-05-04 PROCEDURE — 25000003 PHARM REV CODE 250: Performed by: SURGERY

## 2023-05-04 PROCEDURE — 94761 N-INVAS EAR/PLS OXIMETRY MLT: CPT

## 2023-05-04 PROCEDURE — 97530 THERAPEUTIC ACTIVITIES: CPT | Mod: CQ

## 2023-05-04 RX ADMIN — ENOXAPARIN SODIUM 40 MG: 40 INJECTION SUBCUTANEOUS at 05:05

## 2023-05-04 RX ADMIN — AMOXICILLIN AND CLAVULANATE POTASSIUM 1 TABLET: 875; 125 TABLET, COATED ORAL at 08:05

## 2023-05-04 RX ADMIN — OXYCODONE HYDROCHLORIDE 5 MG: 5 TABLET ORAL at 03:05

## 2023-05-04 RX ADMIN — POLYETHYLENE GLYCOL 3350 17 G: 17 POWDER, FOR SOLUTION ORAL at 09:05

## 2023-05-04 RX ADMIN — TRAZODONE HYDROCHLORIDE 100 MG: 50 TABLET ORAL at 08:05

## 2023-05-04 RX ADMIN — FAMOTIDINE 20 MG: 20 TABLET ORAL at 09:05

## 2023-05-04 RX ADMIN — AMOXICILLIN AND CLAVULANATE POTASSIUM 1 TABLET: 875; 125 TABLET, COATED ORAL at 09:05

## 2023-05-04 NOTE — PLAN OF CARE
Problem: Adult Inpatient Plan of Care  Goal: Plan of Care Review  Outcome: Ongoing, Progressing  Goal: Patient-Specific Goal (Individualized)  Outcome: Ongoing, Progressing  Goal: Absence of Hospital-Acquired Illness or Injury  Outcome: Ongoing, Progressing  Goal: Optimal Comfort and Wellbeing  Outcome: Ongoing, Progressing  Goal: Readiness for Transition of Care  Outcome: Ongoing, Progressing     Problem: Fall Injury Risk  Goal: Absence of Fall and Fall-Related Injury  Outcome: Ongoing, Progressing     Problem: Asthma Comorbidity  Goal: Maintenance of Asthma Control  Outcome: Ongoing, Progressing     Problem: Behavioral Health Comorbidity  Goal: Maintenance of Behavioral Health Symptom Control  Outcome: Ongoing, Progressing     Problem: COPD (Chronic Obstructive Pulmonary Disease) Comorbidity  Goal: Maintenance of COPD Symptom Control  Outcome: Ongoing, Progressing     Problem: Heart Failure Comorbidity  Goal: Maintenance of Heart Failure Symptom Control  Outcome: Ongoing, Progressing     Problem: Hypertension Comorbidity  Goal: Blood Pressure in Desired Range  Outcome: Ongoing, Progressing     Problem: Obstructive Sleep Apnea Risk or Actual Comorbidity Management  Goal: Unobstructed Breathing During Sleep  Outcome: Ongoing, Progressing     Problem: Osteoarthritis Comorbidity  Goal: Maintenance of Osteoarthritis Symptom Control  Outcome: Ongoing, Progressing     Problem: Pain Chronic (Persistent) (Comorbidity Management)  Goal: Acceptable Pain Control and Functional Ability  Outcome: Ongoing, Progressing     Problem: Seizure Disorder Comorbidity  Goal: Maintenance of Seizure Control  Outcome: Ongoing, Progressing     Problem: Skin Injury Risk Increased  Goal: Skin Health and Integrity  Outcome: Ongoing, Progressing     Problem: Balance Impairment (Functional Deficit)  Goal: Improved Balance and Postural Control  Outcome: Ongoing, Progressing     Problem: Muscle Strength Impairment (Functional Deficit)  Goal:  Improved Muscle Strength  Outcome: Ongoing, Progressing     Problem: Range of Motion Impairment (Functional Deficit)  Goal: Optimal Range of Motion  Outcome: Ongoing, Progressing

## 2023-05-04 NOTE — PLAN OF CARE
Pt is missing social security number in chart. [facility] is unable to call in locet.  [facility] notified Sarah-PANCHITO on Tuesday 5/2/23

## 2023-05-04 NOTE — PROGRESS NOTES
No events  Awaiting placement    VS ok  A+ox3, nad  Abdominal wound well healing, drain with serosanguinous output noted    89F with rectus sheath abscess (Sterile) and constipation - both better s/p treatment.    Plan for:  1) Discharge when able once dispo arranged  2) Cont supportive care

## 2023-05-04 NOTE — PT/OT/SLP PROGRESS
Physical Therapy Treatment    Patient Name:  Bailey Medina   MRN:  [id number]    Recommendations:     Discharge Recommendations: home with home health, skilled nursing facility, rehab  Discharge Equipment Recommendations: walker, rolling, other (see comments)  Barriers to discharge:  medical condition    Assessment:     Bailey Medina is a 89 y.o. female admitted with a medical diagnosis of <principal problem not specified>.  She presents with the following impairments/functional limitations: weakness, impaired endurance, impaired functional mobility, gait instability, decreased lower extremity function, decreased safety awareness.    Continued progress with transfers and mobility. Pt is also showing improved endurance with activity.     Rehab Prognosis: Good; patient would benefit from acute skilled PT services to address these deficits and reach maximum level of function.    Recent Surgery: Procedure(s) (LRB):  INCISION AND DRAINAGE, ABDOMEN (N/A) 6 Days Post-Op    Plan:     During this hospitalization, patient to be seen 5 x/week to address the identified rehab impairments via therapeutic activities and progress toward the following goals:    Plan of Care Expires:       Subjective     Chief Complaint: weakness  Patient/Family Comments/goals: to return home  Pain/Comfort:         Objective:     Communicated with patient prior to session.  Patient found HOB elevated with peripheral IV upon PT entry to room.     General Precautions: Standard, fall, seizure  Orthopedic Precautions:    Braces:    Respiratory Status: Room air     Functional Mobility:  Bed Mobility:     Supine to Sit: moderate assistance  Transfers:     Sit to Stand:  moderate assistance with rolling walker  Bed to Chair: moderate assistance with  rolling walker  using  Step Transfer  Balance: mod-max A in supported standing      AM-PAC 6 CLICK MOBILITY          Treatment & Education:  See above    Patient left up in chair with all lines intact,  call button in reach, and chair alarm on..    GOALS:   Multidisciplinary Problems       Physical Therapy Goals          Problem: Physical Therapy    Goal Priority Disciplines Outcome Goal Variances Interventions   Physical Therapy Goal     PT, PT/OT Ongoing, Progressing     Description: Goals to be met by: 5-10-23     Patient will increase functional independence with mobility by performin. Supine to sit with Moderate Assistance  2. Sit to supine with Moderate Assistance  3. Sit to stand transfer with Moderate Assistance  4. Bed to chair transfer with Moderate Assistance using Rolling Walker                         Time Tracking:     PT Received On: 23  PT Start Time: 0950  PT Stop Time: 1015  PT Total Time (min): 25 min     Billable Minutes: Therapeutic Activity 25    Treatment Type: Treatment  PT/PTA: PTA           2023

## 2023-05-04 NOTE — PROGRESS NOTES
Hospital Medicine  Progress Note    Patient Name: Bailey Medina  MRN: [id number]  Status: IP- Inpatient   Admission Date: 4/26/2023  Length of Stay: 8  Date of Service: 05/04/2023       CC: hospital follow-up for abd wall  abscess       SUBJECTIVE   89 y.o. female with a history that includes colonic obstruction back in January, requiring extensive surgical intervention,was brought to ED on 4/26 by care giver after home health attendant noted a hard mass in the abdomen.  Care giver notes patient has been constipated, having hard stools for several days, but this was improving with the use of laxatives. 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  She underwent ex-lap that include sigmoid resection, repair of fistula, and drainage of abscess.  She was eventually discharged to home health on 1/30.  She was doing fairly well until this.  Workup in ED noted grossly unremarkable blood counts and chemistries, including normal white count.  Patient afebrile and HDS.  However CT abd has noted a [id number] abdominal wall abscess.  However due to complicated nature of the patient, Hospital Medicine consulted for admission.  She was started on IV Zosyn, Surgery consulted for drainage.  Initially recommending IR drainage, though unable to obtain.  Eventually underwent drainage with surgery on 4/28.  Surgical cultures remained negative.  Patient remained afebrile and stable.  However PT assessment noted patient very deconditioned, requirig 2 people transfers.  Placement discussed,  patient agreeable, CM consulted    Today: Patient seen and examined at bedside, and chart reviewed.  No new issues or complaints.    MEDICATIONS   Scheduled   amoxicillin-clavulanate 875-125mg  1 tablet Oral Q12H    enoxaparin  40 mg Subcutaneous Daily    famotidine  20 mg Oral Daily    polyethylene glycol  17 g Oral Daily     traZODone  100 mg Oral QHS     Continuous Infusions  None      PHYSICAL EXAM   VITALS: T 97.4 °F (36.3 °C)   /75   P 79   RR 18   O2 98 %    GENERAL: Awake and in NAD  LUNGS: CTA anteriorly  CVS: Normal rate  GI/: Soft, NT, bowel sounds positive.  EXTREMITIES: No peripheral edema  NEURO: AAOx3  PSYCH: Cooperative      LABS   CBC  No results for input(s): WBC, RBC, HGB, HCT, MCV, MCH, MCHC, RDW, PLT, RETIC, ESR in the last 72 hours.    CHEM  No results for input(s): NA, K, CHLORIDE, CO2, BUN, CREATININE, GLUCOSE, CALCIUM, MG, PHOS, ALBUMIN, GLOBULIN, ALKPHOS, ALT, AST, BILITOT, LIPASE, CRP, LDH, HAPTOGLOBIN, FERRITIN, IRON, TIBC, TSH, FREET4 in the last 72 hours.        MICROBIOLOGY     Microbiology Results (last 7 days)       Procedure Component Value Units Date/Time    Anaerobic Culture [[id number]] Collected: 04/28/23 1636    Order Status: Completed Specimen: Abscess from Abdomen Updated: 05/02/23 1020     Anaerobe Culture No Anaerobes Isolated    Wound Culture [[id number]] Collected: 04/28/23 1636    Order Status: Completed Specimen: Abscess from Abdomen Updated: 05/02/23 0737     Wound Culture No Growth    Gram Stain [[id number]] Collected: 04/28/23 1636    Order Status: Completed Specimen: Abscess from Abdomen Updated: 04/30/23 0710     GRAM STAIN Many WBC observed      Rare Gram Negative Rods            ASSESSMENT   Abd wall abscess, s/p drainage by surgery 4/28  h/o colonic obstruction requiring surgical intervention/colostomy Jan 2023  Essential HTN    PLAN   Continue IV Zosyn  Drain management per surgery, planning to remove on outpatient basis  Continue with PT/OT  CM working on SNF placement in [place]  Otherwise continue current management and monitoring in the interim          Prophylaxis: SC Lovenox  Code Status: DNR/DNI      Norbert Rico MD  [facility]

## 2023-05-04 NOTE — PT/OT/SLP PROGRESS
Physical Therapy Treatment    Patient Name:  Bailey Medina   MRN:  [id number]    Recommendations:     Discharge Recommendations: home with home health  Discharge Equipment Recommendations: walker, rolling, other (see comments)  Barriers to discharge: Decreased caregiver support    Assessment:     Bailey Medina is a 89 y.o. female admitted with a medical diagnosis of <principal problem not specified>.  She presents with the following impairments/functional limitations: weakness, impaired endurance, impaired functional mobility, gait instability, decreased lower extremity function, decreased safety awareness and knee pain.    Rehab Prognosis: Fair; patient would benefit from acute skilled PT services to address these deficits and reach maximum level of function.    Recent Surgery: Procedure(s) (LRB):  INCISION AND DRAINAGE, ABDOMEN (N/A) 6 Days Post-Op    Plan:     During this hospitalization, patient to be seen 5 x/week to address the identified rehab impairments via therapeutic activities and progress toward the following goals:    Plan of Care Expires:       Subjective     Chief Complaint: fatigue  Patient/Family Comments/goals: to return home with sitter able to care for her  Pain/Comfort:         Objective:     Communicated with nursing prior to session.  Patient found up in chair with peripheral IV upon PT entry to room.     General Precautions: Standard, fall, seizure  Orthopedic Precautions:    Braces:    Respiratory Status: Room air     Functional Mobility:  Bed Mobility:     Sit to Supine: maximal assistance  Transfers:     Sit to Stand:  moderate assistance with rolling walker  Bed to Chair: moderate assistance and of 2 persons with  standard walker  using  Stand Pivot and Step Transfer      AM-PAC 6 CLICK MOBILITY          Treatment & Education:  See above, transfer training to safely return to bed from chair    Patient left HOB elevated with call button in reach..    GOALS:   Multidisciplinary  Problems       Physical Therapy Goals          Problem: Physical Therapy    Goal Priority Disciplines Outcome Goal Variances Interventions   Physical Therapy Goal     PT, PT/OT Ongoing, Progressing     Description: Goals to be met by: 5-10-23     Patient will increase functional independence with mobility by performin. Supine to sit with Moderate Assistance  2. Sit to supine with Moderate Assistance  3. Sit to stand transfer with Moderate Assistance  4. Bed to chair transfer with Moderate Assistance using Rolling Walker                         Time Tracking:     PT Received On: 23  PT Start Time: 1405     PT Stop Time: 1420  PT Total Time (min): 15 min     Billable Minutes: Therapeutic Activity 15    Treatment Type: Treatment  PT/PTA: PTA           2023

## 2023-05-05 VITALS
DIASTOLIC BLOOD PRESSURE: 75 MMHG | WEIGHT: 155.63 LBS | HEIGHT: 65 IN | SYSTOLIC BLOOD PRESSURE: 121 MMHG | OXYGEN SATURATION: 97 % | RESPIRATION RATE: 18 BRPM | HEART RATE: 93 BPM | BODY MASS INDEX: 25.93 KG/M2 | TEMPERATURE: 99 F

## 2023-05-05 PROBLEM — L02.211 ABDOMINAL WALL ABSCESS: Status: ACTIVE | Noted: 2023-05-05

## 2023-05-05 PROCEDURE — 94761 N-INVAS EAR/PLS OXIMETRY MLT: CPT

## 2023-05-05 PROCEDURE — 25000003 PHARM REV CODE 250: Performed by: SURGERY

## 2023-05-05 PROCEDURE — 63600175 PHARM REV CODE 636 W HCPCS: Performed by: SURGERY

## 2023-05-05 RX ORDER — OXYCODONE HYDROCHLORIDE 5 MG/1
5 TABLET ORAL EVERY 6 HOURS PRN
Qty: 30 TABLET | Refills: 0 | Status: SHIPPED | OUTPATIENT
Start: 2023-05-05

## 2023-05-05 RX ORDER — AMOXICILLIN AND CLAVULANATE POTASSIUM 875; 125 MG/1; MG/1
1 TABLET, FILM COATED ORAL EVERY 12 HOURS
Qty: 28 TABLET | Refills: 0 | Status: SHIPPED | OUTPATIENT
Start: 2023-05-05 | End: 2023-05-19

## 2023-05-05 RX ORDER — TRAZODONE HYDROCHLORIDE 100 MG/1
100 TABLET ORAL NIGHTLY
Qty: 30 TABLET | Refills: 0 | Status: SHIPPED | OUTPATIENT
Start: 2023-05-05

## 2023-05-05 RX ADMIN — POLYETHYLENE GLYCOL 3350 17 G: 17 POWDER, FOR SOLUTION ORAL at 10:05

## 2023-05-05 RX ADMIN — ENOXAPARIN SODIUM 40 MG: 40 INJECTION SUBCUTANEOUS at 04:05

## 2023-05-05 RX ADMIN — AMOXICILLIN AND CLAVULANATE POTASSIUM 1 TABLET: 875; 125 TABLET, COATED ORAL at 10:05

## 2023-05-05 RX ADMIN — FAMOTIDINE 20 MG: 20 TABLET ORAL at 10:05

## 2023-05-05 NOTE — PLAN OF CARE
Pt is alert and orient x 4 at the time of assessment. Pt denies any pain. Pt was able to participate with therapy on today and sit in chair. Dressing were changed during day shift. Will continue to monitor

## 2023-05-05 NOTE — PLAN OF CARE
Problem: Adult Inpatient Plan of Care  Goal: Plan of Care Review  Outcome: Met  Goal: Patient-Specific Goal (Individualized)  Outcome: Met  Goal: Absence of Hospital-Acquired Illness or Injury  Outcome: Met  Goal: Optimal Comfort and Wellbeing  Outcome: Met  Goal: Readiness for Transition of Care  Outcome: Met     Problem: Fall Injury Risk  Goal: Absence of Fall and Fall-Related Injury  Outcome: Met     Problem: Asthma Comorbidity  Goal: Maintenance of Asthma Control  Outcome: Met     Problem: Behavioral Health Comorbidity  Goal: Maintenance of Behavioral Health Symptom Control  Outcome: Met     Problem: COPD (Chronic Obstructive Pulmonary Disease) Comorbidity  Goal: Maintenance of COPD Symptom Control  Outcome: Met     Problem: Heart Failure Comorbidity  Goal: Maintenance of Heart Failure Symptom Control  Outcome: Met     Problem: Hypertension Comorbidity  Goal: Blood Pressure in Desired Range  Outcome: Met     Problem: Obstructive Sleep Apnea Risk or Actual Comorbidity Management  Goal: Unobstructed Breathing During Sleep  Outcome: Met     Problem: Osteoarthritis Comorbidity  Goal: Maintenance of Osteoarthritis Symptom Control  Outcome: Met     Problem: Pain Chronic (Persistent) (Comorbidity Management)  Goal: Acceptable Pain Control and Functional Ability  Outcome: Met     Problem: Seizure Disorder Comorbidity  Goal: Maintenance of Seizure Control  Outcome: Met     Problem: Skin Injury Risk Increased  Goal: Skin Health and Integrity  Outcome: Met     Problem: Balance Impairment (Functional Deficit)  Goal: Improved Balance and Postural Control  Outcome: Met     Problem: Muscle Strength Impairment (Functional Deficit)  Goal: Improved Muscle Strength  Outcome: Met     Problem: Range of Motion Impairment (Functional Deficit)  Goal: Optimal Range of Motion  Outcome: Met

## 2023-05-05 NOTE — PLAN OF CARE
Communicated with [facility] and she states will accept today.  Updated nurse Isabel and notified Dr. Mims who will place d/c orders.  I will fax d/c info to Sari fax# [phone] as requested .  I called pt's [facility] ph# [phone]  to notify that d/c will take place this afternoon.    1445  Nurse Hall called report and she states [facility] has called to request that transport be per [facility] ph# [phone] called to schedule . Called and left a message for Jannette pt's sitter cell# [phone].    1500  [facility] will be here within the hour for transport- nurse Allyson notified.  Called pt's [facility] to inform ph# [phone] . Spoke to pt's sitter Jannette to inform of  within the hour.

## 2023-05-05 NOTE — PT/OT/SLP PROGRESS
Physical Therapy Treatment    Patient Name:  Bailey Medina   MRN:  [id number]    Recommendations:     Discharge Recommendations: home with home health  Discharge Equipment Recommendations: walker, rolling, other (see comments)  Barriers to discharge: Decreased caregiver support due to sitter having surgery soon    Assessment:     Bailey Medina is a 89 y.o. female admitted with a medical diagnosis of <principal problem not specified>.  She presents with the following impairments/functional limitations: weakness, impaired endurance, impaired functional mobility, gait instability, decreased lower extremity function, decreased safety awareness and poor balance.    Rehab Prognosis: Good; patient would benefit from acute skilled PT services to address these deficits and reach maximum level of function.    Recent Surgery: Procedure(s) (LRB):  INCISION AND DRAINAGE, ABDOMEN (N/A) 7 Days Post-Op    Plan:     During this hospitalization, patient to be seen 5 x/week to address the identified rehab impairments via therapeutic activities and progress toward the following goals:    Plan of Care Expires:       Subjective     Chief Complaint: Pt c/o she is leaving for SNF and does not wish to get up  Patient/Family Comments/goals: snf today  Pain/Comfort:         Objective:     Communicated with sitter and pt and nurse prior to session.  Patient found HOB elevated with   upon PT entry to room.     General Precautions: Standard, fall, seizure  Orthopedic Precautions:    Braces:    Respiratory Status: Nasal cannula, flow 2 L/min     Functional Mobility:  Pt declines out of bed or tx due to leaving for SNF      AM-PAC 6 CLICK MOBILITY          Treatment & Education:  na    Patient left HOB elevated with  sitter present..    GOALS:   Multidisciplinary Problems       Physical Therapy Goals          Problem: Physical Therapy    Goal Priority Disciplines Outcome Goal Variances Interventions   Physical Therapy Goal     PT, PT/OT  Ongoing, Progressing     Description: Goals to be met by: 5-10-23     Patient will increase functional independence with mobility by performin. Supine to sit with Moderate Assistance  2. Sit to supine with Moderate Assistance  3. Sit to stand transfer with Moderate Assistance  4. Bed to chair transfer with Moderate Assistance using Rolling Walker                         Time Tracking:     PT Received On: 23  PT Start Time: 1230  PT Stop Time: 1240  PT Total Time (min): 10     Billable Minutes: Total Time 0    Treatment Type: Treatment  PT/PTA: PT           2023

## 2023-05-23 DIAGNOSIS — Z48.89 SURGICAL AFTERCARE, INJURY OR TRAUMA: Primary | ICD-10-CM

## 2023-05-23 DIAGNOSIS — Z48.03 CHANGE OR REMOVAL OF DRAINS: ICD-10-CM

## 2023-05-24 ENCOUNTER — HOSPITAL ENCOUNTER (OUTPATIENT)
Dept: RADIOLOGY | Facility: HOSPITAL | Age: 88
Discharge: HOME | End: 2023-05-24
Attending: SURGERY
Payer: MEDICARE

## 2023-05-24 DIAGNOSIS — Z48.89 SURGICAL AFTERCARE, INJURY OR TRAUMA: ICD-10-CM

## 2023-05-24 DIAGNOSIS — Z48.03 CHANGE OR REMOVAL OF DRAINS: ICD-10-CM

## 2023-05-24 PROCEDURE — 74178 CT ABD&PLV WO CNTR FLWD CNTR: CPT | Mod: TC

## 2023-05-24 PROCEDURE — 25500020 PHARM REV CODE 255: Performed by: SURGERY

## 2023-05-24 RX ADMIN — DIATRIZOATE MEGLUMINE AND DIATRIZOATE SODIUM 30 ML: 660; 100 LIQUID ORAL; RECTAL at 12:05

## 2023-05-24 RX ADMIN — IOPAMIDOL 100 ML: 755 INJECTION, SOLUTION INTRAVENOUS at 12:05

## 2023-05-26 NOTE — DISCHARGE SUMMARY
Hospital Medicine  Discharge Summary    Patient Name: Bailey Medina  MRN: [id number]  Admit Date: 4/26/2023  Discharge Date: 5/5/2023   Status: IP- Inpatient   Length of Stay: 9      PHYSICIANS   Admitting Physician: Norbert Rico MD  Discharging Physician: Norbert Rico MD.  Primary Care Physician: Magdaleno Pruett MD  Consults: General Surgery      DISCHARGE DIAGNOSES   Abd wall abscess, s/p drainage by surgery 4/28  h/o colonic obstruction requiring surgical intervention/colostomy Jan 2023  Essential HTN      PROCEDURES   4/28/23 - General Surgery - drainage of abd wall abscess      HOSPITAL COURSE    89 y.o. female with a history that includes colonic obstruction back in January, requiring extensive surgical intervention,was brought to ED on 4/26 by care giver after home health attendant noted a hard mass in the abdomen.  Care giver notes patient has been constipated, having hard stools for several days, but this was improving with the use of laxatives. 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  She underwent ex-lap that include sigmoid resection, repair of fistula, and drainage of abscess.  She was eventually discharged to home health on 1/30.  She was doing fairly well until this.  Workup in ED noted grossly unremarkable blood counts and chemistries, including normal white count.  Patient afebrile and HDS.  However CT abd has noted a [id number] abdominal wall abscess.  However due to complicated nature of the patient, Hospital Medicine consulted for admission.  She was started on IV Zosyn, Surgery consulted for drainage.  Initially recommending IR drainage, though unable to obtain.  Eventually underwent drainage with surgery on 4/28.  Surgical cultures remained negative.  Patient remained afebrile and stable.  However PT assessment noted patient very deconditioned, requirig 2  people transfers.  Placement discussed,  patient agreeable, CM consulted; patient stable for transfer to SNF.  She is to continues on oral Augmentin for another 2 weeks.  Will continue with surgical drain until she follow with surgery (1-2 weeks)      STATUS  Improved    DISPOSITION  Discharge to Skilled Nursing Facility    DIET  Cardiac    ACTIVITY  As tolerated  Surgical drains to remain in place    FOLLOW-UP      Magdaleno Pruett MD Follow up.    Specialty: Family Medicine  Why: Followig SNF discharge.  Contact information:  [address] [facility] [id number] [phone]               Deana Acevedo MD. Schedule an appointment as soon as possible for a visit in 2 week(s).    Specialty: General Surgery  Contact information:  [address]  [place] [address]  [phone]               [facility] Follow up.    Contact information:  # [phone]               DISCHARGE MEDICATION RECONCILIATION     PRESCRIBED     amoxicillin-clavulanate 875-125mg 875-125 mg per tablet  Commonly known as: AUGMENTIN  Take 1 tablet by mouth every 12 (twelve) hours. for 14 days     oxyCODONE 5 MG immediate release tablet  Commonly known as: ROXICODONE  Take 1 tablet (5 mg total) by mouth every 6 (six) hours as needed for Pain.            CONTINUE      aspirin 81 MG EC tablet  Commonly known as: ECOTRIN     carvediloL 6.25 MG tablet  Commonly known as: COREG     furosemide 40 MG tablet  Commonly known as: LASIX     lactulose 10 gram/15 mL solution  Commonly known as: CHRONULAC     traZODone 100 MG tablet  Commonly known as: DESYREL  Take 1 tablet (100 mg total) by mouth every evening.            DISCONTINUE     ketorolac 10 mg tablet  Commonly known as: TORADOL     metOLazone 5 MG tablet  Commonly known as: ZAROXOLYN     valsartan-hydrochlorothiazide 160-12.5 mg per tablet  Commonly known as: DIOVAN-HCT         PHYSICAL EXAM   VITALS: T 98.8 °F (37.1 °C)   /75   P 93   RR 18   O2  97 %    GENERAL: Awake and in NAD  LUNGS: CTA anteriorly  CVS: Normal rate  GI/: Soft, NT, bowel sounds positive.  EXTREMITIES: No peripheral edema  NEURO: AAOx3  PSYCH: Cooperative        Discharge time: 33 minutes     Norbert Rico MD  [facility]       DIAGNOSITCS   CT Abdomen Pelvis With Contrast  Result Date: 4/26/2023  EXAMINATION: CT ABDOMEN PELVIS WITH CONTRAST CLINICAL HISTORY: Bowel obstruction suspected;, . TECHNIQUE: PATIENT RADIATION DOSE: DLP(mGycm) : 767 As per PQRS measures, all CT scans at this facility used dose modulation, iterative reconstruction, and/or weight based dose adjustment when appropriate to reduce radiation dose to as low as reasonably achievable. COMPARISON: 01/20/2023 FINDINGS: Serial axial images were obtained through the abdomen and pelvis with the administration of  IV contrast. Coronal and sagittal reconstructions where also obtained. Degenerative changes and curvature are noted to the thoracolumbar spine.  Bony structures are osteopenic.  The heart is enlarged.  Atelectasis and/or scarring is evident the lung bases.  There is beam hardening artifact due to patient body habitus.  The liver is mildly decreased in density suggesting steatosis.  There elevation of the right hemidiaphragm.  The spleen, adrenal glands, and pancreas are grossly within normal limits.  The gallbladder is moderately distended with a Phrygian cap.  Atherosclerosis is seen within the aorta and branching vessels.  The kidneys are relatively symmetric in size.  No hydronephrosis is seen.  There is bilateral renal cortical thinning and perinephric stranding.  Round low-attenuation foci are again evident at the kidneys bilaterally suspicious for cysts which have a similar appearance to the recent exam.  No periaortic or pericaval lymphadenopathy is identified no dilated loops of bowel are seen.  Gas and feces are evident throughout the colon.  The appendix is not identified with certainty.  The cecum  appears low lying in position.  The uterus is somewhat small and size.  The bladder is partially distended with fluid.  No free fluid collection is seen.  There is interim placement of an ostomy at the anterior left lower pelvis with the small localized fluid collection in the subcutaneous tissues.  There is interim development of a lobulated/septated cystic focus within the mid upper left anterior abdominal wall musculature measuring 5.0 x 7.9 x 6.4 cm.  There are surrounding hazy walled off margins suggesting a abscess formation.  There is interim resolution of previous dilated fluid-filled loops of bowel in the lower abdomen since the prior exam..   Impression:  1. Interim development of a suspect septated left anterior abdominal wall abscess measuring up to 8 cm in diameter   2. Interim resolution of previous dilated fluid-filled loops of bowel and placement of a ostomy at the anterior left lower abdomen.  There is a small fluid collection surrounding the ostomy within the subcutaneous fat   3. Findings of constipation   4. Cardiomegaly   5. Elevated right hemidiaphragm   6. Findings compatible with cysts to the kidneys bilaterally which have a similar appearance   7. Bilateral renal cortical thinning and perinephric stranding   8. Moderately distended gallbladder with Phrygian cap   9. Nonvisualization of the appendix with low lying cecum.  A repeat exam with oral contrast would allow further evaluation if clinically indicated   Electronically signed by: David Jones Date:    04/26/2023 Time:    15:41

## 2023-06-02 DIAGNOSIS — Z48.03 ENCOUNTER FOR CHANGE OR REMOVAL OF DRAINS: Primary | ICD-10-CM

## 2023-06-07 NOTE — OP NOTE
[facility]                     [address] [address]    PATIENT NAME:      RADHA VALLE   YOB: 1930  CSN:               [id number]  MRN:               [id number]  ADMIT DATE:        04/26/2023 11:18:00  PHYSICIAN:         Marika Acevedo MD                          OPERATIVE REPORT      DATE OF SURGERY:    04/28/2023 00:00:00    SURGEON:  Marika Acevedo MD    PREOPERATIVE DIAGNOSIS:  Abscess versus hematoma, left rectus muscle.    POSTOPERATIVE DIAGNOSIS:  Abscess versus hematoma, left rectus muscle.    PROCEDURES:    1. Incision and drainage of subfascial abscess of the left rectus muscle.  2. Intraoperative ultrasound with needle drainage of the subfascial abscess.    ASSISTANT:  OR staff.    BLOOD LOSS:  5 mL.    COMPLICATIONS:  None.    FINDINGS:    1. Ultrasound used to identify abscess below the fascia that connected to an   area below the muscle.  An 18-gauge needle was used to aspirate yoanna pus.  2. 3.5 cm incision created and fascia entered with purulence released and washed   out.  Drain placed and the fascia was closed anteriorly.    OPERATIVE REPORT:  The patient was brought to the OR, placed in supine position.    Anesthesia was induced.  The airway was controlled.  The left abdomen was   prepped and draped in sterile manner.  Ultrasound was used to identify the   abscess cavity within the left rectus sheath.  An 18-gauge needle was used to   access this cavity.  There was concern for hematoma versus abscess and pus was   immediately delivered as a result along this plane a 3.5 cm incision was used to   cut down skin and subcutaneous tissue, which was clean.  The fascia was   entered.  Abscess was released.  The area was washed out.  A 19-Bel[place]an round   drain was placed within the subfascial plane.  The fascia was reapproximated   with 0 Vicryl suture.  The drain was secured with a 2-0  nylon suture along the   abdominal wall and the subcutaneous base was left open.  The patient tolerated   the procedure well.  There were no complications.  Please note, the cultures   were taken.        ______________________________  MD DAI Hamilton/СВЕТЛАНА  DD:  06/06/2023  Time:  06:35PM  DT:  06/06/2023  Time:  10:54PM  Job #:  [id number]      OPERATIVE REPORT

## 2023-06-08 ENCOUNTER — HOSPITAL ENCOUNTER (OUTPATIENT)
Dept: RADIOLOGY | Facility: HOSPITAL | Age: 88
Discharge: HOME | End: 2023-06-08
Attending: SURGERY
Payer: MEDICARE

## 2023-06-08 DIAGNOSIS — Z48.03 ENCOUNTER FOR CHANGE OR REMOVAL OF DRAINS: ICD-10-CM

## 2023-06-08 LAB
CREAT SERPL-MCNC: 1.3 MG/DL (ref 0.5–1.4)
SAMPLE: NORMAL

## 2023-06-08 PROCEDURE — 25500020 PHARM REV CODE 255: Performed by: SURGERY

## 2023-06-08 PROCEDURE — 74178 CT ABD&PLV WO CNTR FLWD CNTR: CPT | Mod: TC

## 2023-06-08 RX ADMIN — IOPAMIDOL 100 ML: 755 INJECTION, SOLUTION INTRAVENOUS at 08:06

## (undated) DEVICE — PAD ELECTROSURGICAL SPL W/CORD

## (undated) DEVICE — SUT ETHILON 2-0 FS 18IN BLK

## (undated) DEVICE — GLOVE PROTEXIS HYDROGEL SZ8.5

## (undated) DEVICE — SLEEVE SCD EXPRESS CALF MEDIUM

## (undated) DEVICE — TOWEL OR DISP STRL BLUE 4/PK

## (undated) DEVICE — SUT CTD VICRYL 0 VIL BR/CT

## (undated) DEVICE — SEALER G2 ARTC TISS ENSEAL

## (undated) DEVICE — NDL SAFETY 22G X 1.5 ECLIPSE

## (undated) DEVICE — RELOAD PROXIMATE CUT BLUE 75MM

## (undated) DEVICE — COUNTER NDL FOAM BLK 40 COUNT

## (undated) DEVICE — EVACUATOR SURG SUC BLBLF 100ML

## (undated) DEVICE — SUT SA85H SILK 2-0

## (undated) DEVICE — GAUZE DERMACEA LOW PLY 2X4YRDS

## (undated) DEVICE — GOWN POLY REINF SET SLV XL

## (undated) DEVICE — BLADE #15 STERILE CARBON

## (undated) DEVICE — Device

## (undated) DEVICE — ADHESIVE DERMABOND ADVANCED

## (undated) DEVICE — SPONGE LAP 18X18 PREWASHED

## (undated) DEVICE — BLADE SURG CARBON STEEL #10

## (undated) DEVICE — NDL MAGELLAN SAFETY 18G 1.5IN

## (undated) DEVICE — DRESSING MEDIPORE PAD 3.5X4IN

## (undated) DEVICE — SPONGE LAP STRL 4X18IN

## (undated) DEVICE — ELECTRODE PNCL SMOKE EVAC 10FT

## (undated) DEVICE — SUT CTD VICRYL VIL BR CR/SH

## (undated) DEVICE — TRAY DRY SKIN SCRUB PREP

## (undated) DEVICE — SUT CTD VICRYL 3-0 CR/SH

## (undated) DEVICE — TRAY CATH FOL SIL DRN BAG 16FR

## (undated) DEVICE — CHLORAPREP W TINT 26ML APPL

## (undated) DEVICE — KIT PROBE COVER GEL 6X72IN

## (undated) DEVICE — RETAINER FISH GLSMN VISCERA LG

## (undated) DEVICE — DRESSING MEDIPORE CLTH 3.5X6IN

## (undated) DEVICE — HEMOSTAT SURGICEL 4X8IN

## (undated) DEVICE — SUT 3-0 12-18IN SILK

## (undated) DEVICE — SUT GUT PL. 4-0 27 FS-2

## (undated) DEVICE — STAPLER SKIN ROTATING HEAD

## (undated) DEVICE — BARRIER COLOSTOMY 2 3/4IN

## (undated) DEVICE — BLADE CLIPPER SURGICAL GRAY

## (undated) DEVICE — DRAPE FLUID WARMER ORS 44X44IN

## (undated) DEVICE — COVER TABLE 44X90 STERILE

## (undated) DEVICE — GLOVE PROTEXIS HYDROGEL SZ7

## (undated) DEVICE — SUT 0 MAXON GRN

## (undated) DEVICE — ELECTRODE EXTENDED BLADE

## (undated) DEVICE — POSITIONER HEEL FOAM CONVOLTD

## (undated) DEVICE — BINDER ABDOM 4PANEL 12IN LG/XL

## (undated) DEVICE — DRESSING MEPORE 3.6 X 10

## (undated) DEVICE — DRAIN CHANNEL ROUND 19FR

## (undated) DEVICE — GOWN POLY REINF BRTH SLV XL

## (undated) DEVICE — SUT COAT VICRYL 1 CT CR 18

## (undated) DEVICE — SUT VCRL TP 1/2 CIR CT 40MM

## (undated) DEVICE — DRAPE INCISE IOBAN 2 23X17IN

## (undated) DEVICE — GLOVE PROTEXIS BLUE LATEX 7

## (undated) DEVICE — SUT 0 27IN CHROMIC GUT CT-1

## (undated) DEVICE — GLOVE PROTEXIS LTX 6.5

## (undated) DEVICE — GAUZE SPONGE 4X4 12PLY

## (undated) DEVICE — STAPLER INT PROX TX 30X3.5MM

## (undated) DEVICE — SLEEVE PEDI FOAM SMALL

## (undated) DEVICE — SPONGE GAUZE 16PLY 4X4

## (undated) DEVICE — GLOVE PROTEXIS PF LATEX 7.0

## (undated) DEVICE — DRAIN JACKSON PRATT TRCR 19FR

## (undated) DEVICE — TUBING MEDI-VAC 6FT 0.29IN

## (undated) DEVICE — GLOVE PROTEXIS HYDROGEL SZ6.5

## (undated) DEVICE — SUT VICRYL 0 CT-2 27 DYE

## (undated) DEVICE — SOL IRR SOD CHL .9% POUR

## (undated) DEVICE — SUPPORT ULNA NERVE PROTECTOR

## (undated) DEVICE — TRAY SURESTEP CATH FOLEY 16FR

## (undated) DEVICE — SYR 50ML CATH TIP

## (undated) DEVICE — SYR 10CC LUER LOCK

## (undated) DEVICE — SUT CTD VICRYL VIL BR SH 27

## (undated) DEVICE — CUTTER PROXIMATE BLUE 75MM

## (undated) DEVICE — ADHESIVE MASTISOL VIAL 48/BX

## (undated) DEVICE — CLIP LIGATION MEDIUM CLIPS 1

## (undated) DEVICE — SPONGE LAP STRL 18X18IN